# Patient Record
Sex: FEMALE | Race: WHITE | NOT HISPANIC OR LATINO | Employment: FULL TIME | URBAN - METROPOLITAN AREA
[De-identification: names, ages, dates, MRNs, and addresses within clinical notes are randomized per-mention and may not be internally consistent; named-entity substitution may affect disease eponyms.]

---

## 2023-09-27 VITALS — WEIGHT: 197.53 LBS | HEIGHT: 64 IN | BODY MASS INDEX: 33.72 KG/M2

## 2023-09-27 DIAGNOSIS — C50.919 MALIGNANT NEOPLASM OF FEMALE BREAST, UNSPECIFIED ESTROGEN RECEPTOR STATUS, UNSPECIFIED LATERALITY, UNSPECIFIED SITE OF BREAST (MULTI): Primary | ICD-10-CM

## 2023-09-27 RX ORDER — HEPARIN 100 UNIT/ML
500 SYRINGE INTRAVENOUS AS NEEDED
Status: CANCELLED | OUTPATIENT
Start: 2023-09-30

## 2023-09-27 RX ORDER — HEPARIN SODIUM,PORCINE/PF 10 UNIT/ML
50 SYRINGE (ML) INTRAVENOUS AS NEEDED
Status: CANCELLED | OUTPATIENT
Start: 2023-09-30

## 2023-10-14 PROBLEM — I10 HYPERTENSION: Status: ACTIVE | Noted: 2023-10-14

## 2023-10-14 PROBLEM — K57.32 DIVERTICULITIS OF COLON: Status: ACTIVE | Noted: 2023-10-14

## 2023-10-14 PROBLEM — N60.82 FLAT EPITHELIAL ATYPIA OF LEFT BREAST: Status: ACTIVE | Noted: 2023-10-14

## 2023-10-14 PROBLEM — C78.00 BREAST CANCER METASTASIZED TO LUNG (MULTI): Status: ACTIVE | Noted: 2023-10-14

## 2023-10-14 PROBLEM — M17.10 ARTHRITIS OF KNEE: Status: ACTIVE | Noted: 2023-10-14

## 2023-10-14 PROBLEM — R59.1 LYMPHADENOPATHY: Status: ACTIVE | Noted: 2023-10-14

## 2023-10-14 PROBLEM — A04.71 RECURRENT COLITIS DUE TO CLOSTRIDIUM DIFFICILE: Status: ACTIVE | Noted: 2023-10-14

## 2023-10-14 PROBLEM — E87.6 HYPOKALEMIA: Status: ACTIVE | Noted: 2023-10-14

## 2023-10-14 PROBLEM — C77.3 METASTATIC CANCER TO AXILLARY LYMPH NODES (MULTI): Status: ACTIVE | Noted: 2023-10-14

## 2023-10-14 PROBLEM — M17.10 PRIMARY LOCALIZED OSTEOARTHROSIS, LOWER LEG: Status: ACTIVE | Noted: 2023-10-14

## 2023-10-14 PROBLEM — A04.72 CLOSTRIDIUM DIFFICILE DIARRHEA: Status: ACTIVE | Noted: 2023-10-14

## 2023-10-14 PROBLEM — C50.919 BREAST CANCER METASTASIZED TO LUNG (MULTI): Status: ACTIVE | Noted: 2023-10-14

## 2023-10-14 RX ORDER — CALCIUM CARBONATE/VITAMIN D3 600MG-5MCG
1 TABLET ORAL 2 TIMES DAILY
COMMUNITY
End: 2023-11-16 | Stop reason: ALTCHOICE

## 2023-10-14 RX ORDER — PSYLLIUM HUSK 0.4 G
CAPSULE ORAL 2 TIMES DAILY
Status: ON HOLD | COMMUNITY
End: 2024-03-08 | Stop reason: WASHOUT

## 2023-10-14 RX ORDER — LAMOTRIGINE 25 MG/1
TABLET ORAL
COMMUNITY
Start: 2021-08-16 | End: 2023-11-16 | Stop reason: ALTCHOICE

## 2023-10-14 RX ORDER — CHLORTHALIDONE 25 MG/1
1 TABLET ORAL DAILY
COMMUNITY

## 2023-10-14 RX ORDER — POTASSIUM CHLORIDE 20 MEQ/1
2 TABLET, EXTENDED RELEASE ORAL 2 TIMES DAILY
COMMUNITY

## 2023-10-14 RX ORDER — VIT C/E/ZN/COPPR/LUTEIN/ZEAXAN 250MG-90MG
1 CAPSULE ORAL DAILY
Status: ON HOLD | COMMUNITY
End: 2024-03-08 | Stop reason: WASHOUT

## 2023-10-14 RX ORDER — LEVOTHYROXINE SODIUM 75 UG/1
1 TABLET ORAL DAILY
COMMUNITY
Start: 2015-03-21 | End: 2023-11-16 | Stop reason: ALTCHOICE

## 2023-10-14 RX ORDER — CAPECITABINE 500 MG/1
TABLET, FILM COATED ORAL
COMMUNITY
Start: 2023-01-07 | End: 2023-11-16 | Stop reason: ALTCHOICE

## 2023-10-14 RX ORDER — ALBUTEROL SULFATE 90 UG/1
2 AEROSOL, METERED RESPIRATORY (INHALATION) 4 TIMES DAILY PRN
COMMUNITY
Start: 2022-11-02 | End: 2023-11-16 | Stop reason: ALTCHOICE

## 2023-10-14 RX ORDER — MULTIVIT,IRON,MINERALS/LUTEIN
1 TABLET ORAL DAILY
Status: ON HOLD | COMMUNITY
End: 2024-03-08 | Stop reason: WASHOUT

## 2023-10-14 RX ORDER — LEVOTHYROXINE SODIUM 88 UG/1
TABLET ORAL
COMMUNITY
Start: 2023-09-19 | End: 2023-12-26

## 2023-10-14 RX ORDER — ONDANSETRON HYDROCHLORIDE 8 MG/1
1 TABLET, FILM COATED ORAL EVERY 8 HOURS PRN
Status: ON HOLD | COMMUNITY
Start: 2023-04-22 | End: 2024-03-08 | Stop reason: WASHOUT

## 2023-10-14 RX ORDER — ASPIRIN 81 MG/1
1 TABLET ORAL DAILY
COMMUNITY
Start: 2015-06-11 | End: 2023-11-16 | Stop reason: ALTCHOICE

## 2023-10-14 RX ORDER — PAROXETINE HYDROCHLORIDE 10 MG/1
1 TABLET, FILM COATED ORAL DAILY
COMMUNITY
Start: 2016-01-28

## 2023-10-14 RX ORDER — LANOLIN ALCOHOL/MO/W.PET/CERES
1 CREAM (GRAM) TOPICAL DAILY
Status: ON HOLD | COMMUNITY
End: 2024-03-08 | Stop reason: WASHOUT

## 2023-10-14 RX ORDER — POTASSIUM CHLORIDE 1.5 G/1.58G
1 POWDER, FOR SOLUTION ORAL DAILY
Status: ON HOLD | COMMUNITY
Start: 2015-06-11 | End: 2024-02-23 | Stop reason: ENTERED-IN-ERROR

## 2023-10-14 RX ORDER — HYDROCHLOROTHIAZIDE 25 MG/1
1 TABLET ORAL DAILY
Status: ON HOLD | COMMUNITY
Start: 2015-06-16 | End: 2024-03-08 | Stop reason: WASHOUT

## 2023-10-14 RX ORDER — CHOLECALCIFEROL (VITAMIN D3) 25 MCG
1 TABLET ORAL DAILY
COMMUNITY
Start: 2015-06-11 | End: 2023-11-16 | Stop reason: SDUPTHER

## 2023-10-14 RX ORDER — AMLODIPINE BESYLATE 5 MG/1
1 TABLET ORAL DAILY
COMMUNITY
Start: 2016-04-15

## 2023-10-14 RX ORDER — LIDOCAINE AND PRILOCAINE 25; 25 MG/G; MG/G
CREAM TOPICAL
COMMUNITY
Start: 2023-02-10 | End: 2023-11-16 | Stop reason: ALTCHOICE

## 2023-10-17 ENCOUNTER — OFFICE VISIT (OUTPATIENT)
Dept: HEMATOLOGY/ONCOLOGY | Facility: HOSPITAL | Age: 63
End: 2023-10-17
Payer: COMMERCIAL

## 2023-10-17 ENCOUNTER — LAB (OUTPATIENT)
Dept: HEMATOLOGY/ONCOLOGY | Facility: HOSPITAL | Age: 63
End: 2023-10-17
Payer: COMMERCIAL

## 2023-10-17 VITALS
RESPIRATION RATE: 20 BRPM | TEMPERATURE: 98.1 F | OXYGEN SATURATION: 97 % | HEART RATE: 93 BPM | HEIGHT: 64 IN | DIASTOLIC BLOOD PRESSURE: 83 MMHG | BODY MASS INDEX: 32.93 KG/M2 | SYSTOLIC BLOOD PRESSURE: 140 MMHG | WEIGHT: 192.9 LBS

## 2023-10-17 DIAGNOSIS — C50.919 MALIGNANT NEOPLASM OF FEMALE BREAST, UNSPECIFIED ESTROGEN RECEPTOR STATUS, UNSPECIFIED LATERALITY, UNSPECIFIED SITE OF BREAST (MULTI): Primary | ICD-10-CM

## 2023-10-17 DIAGNOSIS — C50.919 MALIGNANT NEOPLASM OF FEMALE BREAST, UNSPECIFIED ESTROGEN RECEPTOR STATUS, UNSPECIFIED LATERALITY, UNSPECIFIED SITE OF BREAST (MULTI): ICD-10-CM

## 2023-10-17 LAB
ALBUMIN SERPL BCP-MCNC: 4.1 G/DL (ref 3.4–5)
ALP SERPL-CCNC: 80 U/L (ref 33–136)
ALT SERPL W P-5'-P-CCNC: 30 U/L (ref 7–45)
ANION GAP SERPL CALC-SCNC: 11 MMOL/L (ref 10–20)
AST SERPL W P-5'-P-CCNC: 47 U/L (ref 9–39)
BASOPHILS # BLD AUTO: 0.08 X10*3/UL (ref 0–0.1)
BASOPHILS NFR BLD AUTO: 1.4 %
BILIRUB SERPL-MCNC: 1.4 MG/DL (ref 0–1.2)
BUN SERPL-MCNC: 10 MG/DL (ref 6–23)
CALCIUM SERPL-MCNC: 9.6 MG/DL (ref 8.6–10.3)
CHLORIDE SERPL-SCNC: 99 MMOL/L (ref 98–107)
CO2 SERPL-SCNC: 31 MMOL/L (ref 21–32)
CREAT SERPL-MCNC: 0.59 MG/DL (ref 0.5–1.05)
DACRYOCYTES BLD QL SMEAR: NORMAL
EOSINOPHIL # BLD AUTO: 0.27 X10*3/UL (ref 0–0.7)
EOSINOPHIL NFR BLD AUTO: 4.8 %
ERYTHROCYTE [DISTWIDTH] IN BLOOD BY AUTOMATED COUNT: 23.6 % (ref 11.5–14.5)
GFR SERPL CREATININE-BSD FRML MDRD: >90 ML/MIN/1.73M*2
GLUCOSE SERPL-MCNC: 135 MG/DL (ref 74–99)
HCT VFR BLD AUTO: 32.7 % (ref 36–46)
HGB BLD-MCNC: 10.8 G/DL (ref 12–16)
IMM GRANULOCYTES # BLD AUTO: 0.02 X10*3/UL (ref 0–0.7)
IMM GRANULOCYTES NFR BLD AUTO: 0.4 % (ref 0–0.9)
LYMPHOCYTES # BLD AUTO: 0.95 X10*3/UL (ref 1.2–4.8)
LYMPHOCYTES NFR BLD AUTO: 17 %
MCH RBC QN AUTO: 25.7 PG (ref 26–34)
MCHC RBC AUTO-ENTMCNC: 33 G/DL (ref 32–36)
MCV RBC AUTO: 78 FL (ref 80–100)
MONOCYTES # BLD AUTO: 0.92 X10*3/UL (ref 0.1–1)
MONOCYTES NFR BLD AUTO: 16.5 %
NEUTROPHILS # BLD AUTO: 3.34 X10*3/UL (ref 1.2–7.7)
NEUTROPHILS NFR BLD AUTO: 59.9 %
NRBC BLD-RTO: 0 /100 WBCS (ref 0–0)
OVALOCYTES BLD QL SMEAR: NORMAL
PLATELET # BLD AUTO: 314 X10*3/UL (ref 150–450)
PMV BLD AUTO: ABNORMAL FL
POLYCHROMASIA BLD QL SMEAR: NORMAL
POTASSIUM SERPL-SCNC: 3.4 MMOL/L (ref 3.5–5.3)
PROT SERPL-MCNC: 7.5 G/DL (ref 6.4–8.2)
RBC # BLD AUTO: 4.2 X10*6/UL (ref 4–5.2)
RBC MORPH BLD: NORMAL
SCHISTOCYTES BLD QL SMEAR: NORMAL
SODIUM SERPL-SCNC: 138 MMOL/L (ref 136–145)
WBC # BLD AUTO: 5.6 X10*3/UL (ref 4.4–11.3)

## 2023-10-17 PROCEDURE — 99215 OFFICE O/P EST HI 40 MIN: CPT | Performed by: INTERNAL MEDICINE

## 2023-10-17 PROCEDURE — 96523 IRRIG DRUG DELIVERY DEVICE: CPT

## 2023-10-17 PROCEDURE — 80053 COMPREHEN METABOLIC PANEL: CPT

## 2023-10-17 PROCEDURE — 36591 DRAW BLOOD OFF VENOUS DEVICE: CPT

## 2023-10-17 PROCEDURE — 3077F SYST BP >= 140 MM HG: CPT | Performed by: INTERNAL MEDICINE

## 2023-10-17 PROCEDURE — 3079F DIAST BP 80-89 MM HG: CPT | Performed by: INTERNAL MEDICINE

## 2023-10-17 PROCEDURE — 85025 COMPLETE CBC W/AUTO DIFF WBC: CPT

## 2023-10-17 RX ORDER — FAMOTIDINE 10 MG/ML
20 INJECTION INTRAVENOUS ONCE AS NEEDED
Status: CANCELLED | OUTPATIENT
Start: 2023-10-20

## 2023-10-17 RX ORDER — DIPHENHYDRAMINE HYDROCHLORIDE 50 MG/ML
50 INJECTION INTRAMUSCULAR; INTRAVENOUS AS NEEDED
Status: CANCELLED | OUTPATIENT
Start: 2023-10-20

## 2023-10-17 RX ORDER — ALBUTEROL SULFATE 0.83 MG/ML
3 SOLUTION RESPIRATORY (INHALATION) AS NEEDED
Status: CANCELLED | OUTPATIENT
Start: 2023-11-10

## 2023-10-17 RX ORDER — PROCHLORPERAZINE EDISYLATE 5 MG/ML
10 INJECTION INTRAMUSCULAR; INTRAVENOUS EVERY 6 HOURS PRN
Status: CANCELLED | OUTPATIENT
Start: 2023-10-20

## 2023-10-17 RX ORDER — PROCHLORPERAZINE MALEATE 5 MG
10 TABLET ORAL EVERY 6 HOURS PRN
Status: CANCELLED | OUTPATIENT
Start: 2023-11-10

## 2023-10-17 RX ORDER — FAMOTIDINE 10 MG/ML
20 INJECTION INTRAVENOUS ONCE AS NEEDED
Status: CANCELLED | OUTPATIENT
Start: 2023-11-10

## 2023-10-17 RX ORDER — EPINEPHRINE 0.3 MG/.3ML
0.3 INJECTION SUBCUTANEOUS EVERY 5 MIN PRN
Status: CANCELLED | OUTPATIENT
Start: 2023-11-10

## 2023-10-17 RX ORDER — PROCHLORPERAZINE EDISYLATE 5 MG/ML
10 INJECTION INTRAMUSCULAR; INTRAVENOUS EVERY 6 HOURS PRN
Status: CANCELLED | OUTPATIENT
Start: 2023-11-10

## 2023-10-17 RX ORDER — EPINEPHRINE 0.3 MG/.3ML
0.3 INJECTION SUBCUTANEOUS EVERY 5 MIN PRN
Status: CANCELLED | OUTPATIENT
Start: 2023-10-20

## 2023-10-17 RX ORDER — PROCHLORPERAZINE MALEATE 5 MG
10 TABLET ORAL EVERY 6 HOURS PRN
Status: CANCELLED | OUTPATIENT
Start: 2023-10-20

## 2023-10-17 RX ORDER — DIPHENHYDRAMINE HYDROCHLORIDE 50 MG/ML
50 INJECTION INTRAMUSCULAR; INTRAVENOUS AS NEEDED
Status: CANCELLED | OUTPATIENT
Start: 2023-11-10

## 2023-10-17 RX ORDER — PALONOSETRON 0.05 MG/ML
0.25 INJECTION, SOLUTION INTRAVENOUS ONCE
Status: CANCELLED | OUTPATIENT
Start: 2023-10-20

## 2023-10-17 RX ORDER — ALBUTEROL SULFATE 0.83 MG/ML
3 SOLUTION RESPIRATORY (INHALATION) AS NEEDED
Status: CANCELLED | OUTPATIENT
Start: 2023-10-20

## 2023-10-17 RX ORDER — PALONOSETRON 0.05 MG/ML
0.25 INJECTION, SOLUTION INTRAVENOUS ONCE
Status: CANCELLED | OUTPATIENT
Start: 2023-11-10

## 2023-10-17 ASSESSMENT — PAIN SCALES - GENERAL: PAINLEVEL: 0-NO PAIN

## 2023-10-17 NOTE — PROGRESS NOTES
Patient Visit Information:   Visit Type: Follow Up Visit      Cancer History:          Breast         AJCC Edition: 7th (AJCC), Diagnosis Date: 05-Jun-2015, IV, T2 N1 M1      Treatment Synopsis:    62-year-old postmenopausal  female with stage IV multifocal right-sided invasive ductal carcinoma with metastatic disease to the lungs bilaterally.      The patient's breast cancer was diagnosed on June 5, 2015, and is estrogen receptor positive at 90%, progesterone receptor positive at 70%, and HER-2/jordyn negative. Staging CT showed bilateral pulmonary nodules. Nodules were biospy-proven (07/01/2015)  to be consistent with metastatic mammary carcinoma with neuroendocrine features, ER 99%, ME 85%, and her2 negative (1+). .     CURRENT THERAPY: Fam Trastuzumab Deruxtecan              Treatment History:    2015-7-1: Cancer Staging: Lung biopsy: Metastatic mammary carcinoma with neuroendocrine features, ER 99%, ME 85%, and her2 negative.  2015-7-8: New Treatment Plan: Initiated on Letrozole 2.5mg by mouth daily plus pablociclib 125 mg by mouth daily 3 weeks on, one week off.  2018-8-10: New Treatment Plan: Letrozole discontinued. Patient started on Faslodex plus Ibrance due to slight disease progression in right breast  2019-1-16: Cancer Related Surgery: S/p bilateral mastectomy with right ALND. Pathology revealed 3.0cm of invasive ductal carcinoma, with 1/2 SLNs positive and 1/5 ALNs positive.  2019-4-19: New Treatment Plan: Faslodex discontinued. Letrozole restarted. Will continue on Ibrance  2020-9-11: New Treatment Plan: Faslodex resumed due to progression in lung. Letrozole discontinued. Will remain on Ibrance  2022-7-2: New Treatment Plan: Started on Afinitor and Exemestane. Ibrance and Faslodex discontinued due to disease progression  2022-9-16: Disease Assessment: Interval increase in thoracic tumor burden  2022-10-7: New Treatment Plan: Started on Capecitabine 2000mg by mouth BID        History  of Present Illness:      ID Statement:    TJ CROCKETT is a 62 year old Female        Chief Complaint: Here for a follow-up visit and to  be evaluated prior to cycle #11 and to review scans      Interval History:       INTERVAL HISTORY     Ms. Yue Barroso comes in for a follow-up visit.  She denies fever chills or rigors, n/v/d.     Last set of restaging scans were completed on  09/2012023. This showed stable disease.      Her last echo was completed on 08/10/2023 which showed LVEF 60-65%              Allergies and Intolerances:       Allergies:         penicillin: Drug, Hives/Urticaria, Active     Outpatient Medication Profile:  * Patient Currently Takes Medications as of 05-Sep-2023 10:52 documented in Structured Notes         ondansetron 8 mg oral tablet : Last Dose Taken:  , 1 tab(s) orally every 8 hours, As Needed , Start Date: 28-Feb-2023         lidocaine-prilocaine 2.5%-2.5% topical cream: Last Dose Taken:  , [facility]  [facility]-45 [facility] before being accessed, Start Date: 10-Feb-2023         chlorthalidone 25 mg oral tablet: Last Dose Taken:  , 1 tab(s) orally once  a day         Vitamin D3 1000 intl units oral capsule: Last Dose Taken:  , 1 cap(s) orally  once a day         Paxil 10 mg oral tablet: Last Dose Taken:  , 1 tab(s) orally once a day         Centrum Adults oral tablet: Last Dose Taken:  , 1 tab(s) orally once a  day         amLODIPine 5 mg oral tablet: Last Dose Taken:  , 1 tab(s) orally once a  day         Calcium 600+D 600 mg-200 intl units oral tablet: Last Dose Taken:  , 1  tab(s) orally 2 times a day         magnesium oxide 400 mg oral tablet: Last Dose Taken:  , 1 tab(s) orally  once a day         potassium chloride 20 mEq oral tablet, extended release: Last Dose Taken:   , 2 tab(s) orally 2 times a day         turmeric: Last Dose Taken:  , 1500 milligram(s) orally once a day         Synthroid 88 mcg (0.088 mg) oral tablet: Last Dose Taken:  , 1 tab(s)  orally  once a day             Medical History:         High risk medication use: ICD-10: Z79.899, Status: Active         Breast cancer: ICD-10: C50.919, Status: Active         Recurrent Clostridium difficile diarrhea: ICD-10: A04.7, Status:  Active         Hypokalemia: ICD-10: E87.6, Status: Active         Malignant neoplasm of breast: ICD-10: C50.919, Status: Active        Social History:   Social Substance History:  ·  Smoking Status never smoker (1)   ·  Additional History     Breast Cancer Risk Factors:  , Had her menarche at age 10 years,  menopause 52 , used BCP x 2 years but never used HRT     Family History   Mother had ovarian cancer - age 65- BRCA negative s/p b/l salpingo oopherectomy.  Father  had colon cancer- age 56,  at 59 of metastatic disease.     Social History    Never smoker , social alcohol use  Active , exercises regularly, administrative job.                 Performance:   ECOG Performance Status: 0 Fully Active         Vitals and Measurements:   Vitals: Temp: 36.8  HR: 95  RR: 17  BP: 139/87  SPO2%:   99   Measurements: HT(cm): 163.8  WT(kg): 88.7  BSA: 2   BMI:  33   Second Set of Vitals/Vitals Comment: 148/ 84 manual   Last 3 Weights & Heights: Date:                           Weight/Scale Type:                    Height:   2019 12:27                98.6  kg / standing scale                     162.5  cm  17-May-2019 10:25                99.2  kg / standing scale                     162.5  cm  2019 10:35                100  kg / standing scale                     162.5  cm      Physical Exam:      Constitutional: AAOx3. appears comfortable.   Eyes: B/l GAEL. EOMI   ENMT: No oral ulcers or thrush. No pharyngeal congestion.   Head/Neck: No thyromegaly/JVD.   Respiratory/Thorax: B/l Air entry equal. No added  sounds.   Cardiovascular: S1s2+. No murmur/rub/Gallop   Gastrointestinal: Soft, Non tender. No hepatosplenomegaly.  BS+.   Extremities: No pedal edema.   Neurological: No  focal motor deficits.   Breast: B/l s/p mastectomy, Lumpiness felt-stable  since last clinic visit.  No lymphedema   Lymphatic: No significant lymphadenopathy   Psychological: Appropriate mood and behavior   Skin: No rash/petechiae         Lab Results:     ·  Results        CBC date/time       WBC     HGB     HCT     PLT     Neut      05-Sep-2023 10:34   7.3     11.1(L) 34.2(L) 324     4.55     BMP date/time       NA              K               CL              CO2             BUN             CREAT             15-Aug-2023 09:57   138             3.7             102             N/A             13              0.56     Hepatic date/time   T Pro   T Bili  AST     ALT     ALKP    ALB       06-Jan-2023 07:49   6.7     2.1(H)  N/A     24      88      4.0     LDH date/time       LDH     03-Jun-2022 09:45   N/A     Radiology Result:     ·  Results           Impression:     1.  Interval mild worsening of metastatic disease burden within the  chest demonstrated by multiple enlarging subcentimeter nodules and  progressive enlargement of multiple mediastinal lymph nodes as  described above. Stable appearance of the larger solid nodules as  described above without evidence of new nodules or masses.  2. No evidence of metastatic disease within the abdomen or pelvis.  3. Additional stable chronic findings as above.      CT Chest Abdomen Pelvis with IV Contrast [Sep 16 2022  5:01PM]        Impression:     Breast cancer, restaging scan. When compared with the 04/01/2022  study, there is minimal interval worsening of intrathoracic tumor  burden as described above. No definite new sites of disease seen on  current study. Hepatic steatosis, to be correlated with serum LFTs.      CT Chest Abdomen Pelvis with IV Contrast [Jair  3 2022  2:16PM]        Impression:     CHEST:  1.  Grossly similar size of a 1.9 cm right upper lobe nodule which  infiltrates the right upper lobe segmental bronchi. There is interval  anterior extension along the  bronchovascular bundle with extension  towards 1 cm satellite lesion which may represent atelectatic changes  due to mucous plugging versus interval increase in metastatic disease  burden. There is otherwise overall similar intrathoracic tumor burden  with grossly stable size of multiple pulmonary nodules. There are no  new pulmonarynodules visualized.  2. Grossly similar appearance of intrathoracic lymphadenopathy.  3. Similar stenosis of the upper right segmental bronchi by an  adjacent metastatic pulmonary nodule without complete obstruction.  4. Postsurgical changes of bilateral mastectomy without evidence of  local recurrence.     ABDOMEN-PELVIS:  1.  No new abdominopelvic metastatic disease or lymphadenopathy.  2. No acute abdominopelvic process.      CT Chest Abdomen Pelvis with IV Contrast [Apr 1 2022  7:46PM]        Echocardiogram [May  2 2023  9:30AM]        Assessment and Plan:      Assessment and Plan:   Assessment:    62 year old post menopausal woman with stage IV breast cancer, invasive ductal carcinoma, ER/NM positive, HER 2 negative. s/p bilateral mastectomies in 01/2019.  Started on Ibrance and faslodex. Patient switched back to Letrozole due to the fact that progression was in breast only and she has now undergone a mastectomy.  Has since developed progression.      She is currently on Enhertu. Friday will be C11 D1.      Scans on 9/1/2023 showed stable disease.     Treatment will proceed on Friday                    Plan:        Patient will continue with Enhertu every 3 weeks on Friays with MD visits on Tuesdays all on main campus   CBC with diff and CMP on Tuesdays prior to treatment on Fridays   RTC in 3 days for cycle #13 of Enhertu and every 3 weeks thereafter   RTC for MD vist, on 12/22/2023   Next Echo due in November   Will follow-up with surgery in a year for surveillance MRI for suspected neuroendocrine tumor of pancreas

## 2023-10-17 NOTE — PROGRESS NOTES
Patient Visit Information:   Visit Type: Follow Up Visit      Cancer History:          Breast         AJCC Edition: 7th (AJCC), Diagnosis Date: 05-Jun-2015, IV, T2 N1 M1      Treatment Synopsis:    62-year-old postmenopausal  female with stage IV multifocal right-sided invasive ductal carcinoma with metastatic disease to the lungs bilaterally.      The patient's breast cancer was diagnosed on June 5, 2015, and is estrogen receptor positive at 90%, progesterone receptor positive at 70%, and HER-2/jordyn negative. Staging CT showed bilateral pulmonary nodules. Nodules were biospy-proven (07/01/2015)  to be consistent with metastatic mammary carcinoma with neuroendocrine features, ER 99%, VT 85%, and her2 negative (1+). .     CURRENT THERAPY: Fam Trastuzumab Deruxtecan              Treatment History:    2015-7-1: Cancer Staging: Lung biopsy: Metastatic mammary carcinoma with neuroendocrine features, ER 99%, VT 85%, and her2 negative.  2015-7-8: New Treatment Plan: Initiated on Letrozole 2.5mg by mouth daily plus pablociclib 125 mg by mouth daily 3 weeks on, one week off.  2018-8-10: New Treatment Plan: Letrozole discontinued. Patient started on Faslodex plus Ibrance due to slight disease progression in right breast  2019-1-16: Cancer Related Surgery: S/p bilateral mastectomy with right ALND. Pathology revealed 3.0cm of invasive ductal carcinoma, with 1/2 SLNs positive and 1/5 ALNs positive.  2019-4-19: New Treatment Plan: Faslodex discontinued. Letrozole restarted. Will continue on Ibrance  2020-9-11: New Treatment Plan: Faslodex resumed due to progression in lung. Letrozole discontinued. Will remain on Ibrance  2022-7-2: New Treatment Plan: Started on Afinitor and Exemestane. Ibrance and Faslodex discontinued due to disease progression  2022-9-16: Disease Assessment: Interval increase in thoracic tumor burden  2022-10-7: New Treatment Plan: Started on Capecitabine 2000mg by mouth BID  2023-2-10: New treatment  started with FAM-Trastuzumab Deruxtecan due to disease progression        History of Present Illness:      ID Statement:    TJ CROCKETT is a 62 year old Female        Chief Complaint: Here for a follow-up visit and to  be evaluated prior to cycle #13      Interval History:       INTERVAL HISTORY     Ms. Yue Barroso comes in for a follow-up visit.  She denies fever chills or rigors, n/v/d.     Last set of restaging scans were completed on  09/20/2023. This showed stable disease.      Her last echo was completed on 08/10/2023 which showed LVEF 60-65%              Allergies and Intolerances:       Allergies:         penicillin: Drug, Hives/Urticaria, Active     Outpatient Medication Profile:  * Patient Currently Takes Medications as of 05-Sep-2023 10:52 documented in Structured Notes         ondansetron 8 mg oral tablet : Last Dose Taken:  , 1 tab(s) orally every 8 hours, As Needed , Start Date: 28-Feb-2023         lidocaine-prilocaine 2.5%-2.5% topical cream: Last Dose Taken:  , Apply  topically as needed to [facility] site 30-45 minutes before being accessed, Start Date: 10-Feb-2023         chlorthalidone 25 mg oral tablet: Last Dose Taken:  , 1 tab(s) orally once  a day         Vitamin D3 1000 intl units oral capsule: Last Dose Taken:  , 1 cap(s) orally  once a day         Paxil 10 mg oral tablet: Last Dose Taken:  , 1 tab(s) orally once a day         Centrum Adults oral tablet: Last Dose Taken:  , 1 tab(s) orally once a  day         amLODIPine 5 mg oral tablet: Last Dose Taken:  , 1 tab(s) orally once a  day         Calcium 600+D 600 mg-200 intl units oral tablet: Last Dose Taken:  , 1  tab(s) orally 2 times a day         magnesium oxide 400 mg oral tablet: Last Dose Taken:  , 1 tab(s) orally  once a day         potassium chloride 20 mEq oral tablet, extended release: Last Dose Taken:   , 2 tab(s) orally 2 times a day         turmeric: Last Dose Taken:  , 1500 milligram(s) orally once a day         Synthroid 88  mcg (0.088 mg) oral tablet: Last Dose Taken:  , 1 tab(s)  orally once a day             Medical History:         High risk medication use: ICD-10: Z79.899, Status: Active         Breast cancer: ICD-10: C50.919, Status: Active         Recurrent Clostridium difficile diarrhea: ICD-10: A04.7, Status:  Active         Hypokalemia: ICD-10: E87.6, Status: Active         Malignant neoplasm of breast: ICD-10: C50.919, Status: Active        Social History:   Social Substance History:  ·  Smoking Status never smoker (1)   ·  Additional History     Breast Cancer Risk Factors:  , Had her menarche at age 10 years,  menopause 52 , used BCP x 2 years but never used HRT     Family History   Mother had ovarian cancer - age 65- BRCA negative s/p b/l salpingo oopherectomy.  Father  had colon cancer- age 56,  at 59 of metastatic disease.     Social History    Never smoker , social alcohol use  Active , exercises regularly, administrative job.                 Performance:   ECOG Performance Status: 0 Fully Active         Vitals and Measurements:   Vitals: Temp: 36.8  HR: 95  RR: 17  BP: 139/87  SPO2%:   99   Measurements: HT(cm): 163.8  WT(kg): 88.7  BSA: 2   BMI:  33   Second Set of Vitals/Vitals Comment: 148/ 84 manual   Last 3 Weights & Heights: Date:                           Weight/Scale Type:                    Height:   2019 12:27                98.6  kg / standing scale                     162.5  cm  17-May-2019 10:25                99.2  kg / standing scale                     162.5  cm  2019 10:35                100  kg / standing scale                     162.5  cm      Physical Exam:      Constitutional: AAOx3. appears comfortable.   Eyes: B/l GAEL. EOMI   ENMT: No oral ulcers or thrush. No pharyngeal congestion.   Head/Neck: No thyromegaly/JVD.   Respiratory/Thorax: B/l Air entry equal. No added  sounds.   Cardiovascular: S1s2+. No murmur/rub/Gallop   Gastrointestinal: Soft, Non tender. No  hepatosplenomegaly.  BS+.   Extremities: No pedal edema.   Neurological: No focal motor deficits.   Breast: B/l s/p mastectomy, Lumpiness felt-stable  since last clinic visit.  No lymphedema   Lymphatic: No significant lymphadenopathy   Psychological: Appropriate mood and behavior   Skin: No rash/petechiae         Lab Results:     ·  Results       Lab Results   Component Value Date    WBC 5.6 10/17/2023    HGB 10.8 (L) 10/17/2023    HCT 32.7 (L) 10/17/2023    MCV 78 (L) 10/17/2023     10/17/2023        Lab Results   Component Value Date    NEUTROABS 3.34 10/17/2023          Chemistry    Lab Results   Component Value Date/Time     10/17/2023 1108    K 3.4 (L) 10/17/2023 1108    CL 99 10/17/2023 1108    CO2 31 10/17/2023 1108    BUN 10 10/17/2023 1108    CREATININE 0.59 10/17/2023 1108    Lab Results   Component Value Date/Time    CALCIUM 9.6 10/17/2023 1108    ALKPHOS 80 10/17/2023 1108    AST 47 (H) 10/17/2023 1108    ALT 30 10/17/2023 1108    BILITOT 1.4 (H) 10/17/2023 1108         Radiology Result:     ·  Results        Study Result    Narrative & Impression   Interpreted By:  CESRA BRUCE MD  MRN: [id number]  Patient Name: TJ CROCKETT     STUDY:  CT CHEST ABDOMEN PELVIS W IV CONTRAST;  9/1/2023 10:23 am     INDICATION:  Breast cancer with lung metastases. Initially diagnosed 06/2015.     COMPARISON:  CT chest, abdomen, and pelvis 04/27/2023.     ACCESSION NUMBER(S):  [id number]     ORDERING CLINICIAN:  OTTONIEL POWERS   IMPRESSION:  Restaging scan for breast cancer.  1. Overall stable tumor burden when compared to most recent CT  04/20/2023.  2. No significant interval change in size of a right lower lobe  pulmonary mass or bilateral pulmonary nodules.  3. Similar size of mediastinal and right hilar lymph nodes.  4. Similar 4 mm hyperenhancing focus in the pancreatic tail compared  to prior CT which may represent a primary pancreatic lesion such as a  neuroendocrine tumor or less likely  metastases.  5. No new sites of metastatic disease are identified.  6. Additional chronic findings are stable as detailed above.     Feel free to Doc Sunny Lopes with any questions or concerns  about this report.          Assessment and Plan:   Assessment:    62 year old post menopausal woman with stage IV breast cancer, invasive ductal carcinoma, ER/NV positive, HER 2 negative. s/p bilateral mastectomies in 01/2019.  Started on Ibrance and faslodex. Patient switched back to Letrozole due to the fact that progression was in breast only and she has now undergone a mastectomy.  Has since developed progression.      She is currently on Enhertu. Friday will be C13 D1.      Scans on 9/1/2023 showed stable disease.     Treatment will proceed on Friday        Plan:         Patient will continue with Enhertu every 3 weeks on Friays with MD visits on Tuesdays all on [facility]   CBC with diff and CMP on Tuesdays prior to treatment on Fridays   RTC in 3 days for cycle #13 of Enhertu and every 3 weeks thereafter   RTC for MD vist, on 12/22/2023   Next Echo due in November   Will follow-up with surgery in a year for surveillance MRI for suspected neuroendocrine tumor of pancreas

## 2023-10-20 ENCOUNTER — INFUSION (OUTPATIENT)
Dept: HEMATOLOGY/ONCOLOGY | Facility: HOSPITAL | Age: 63
End: 2023-10-20
Payer: COMMERCIAL

## 2023-10-20 VITALS
RESPIRATION RATE: 18 BRPM | TEMPERATURE: 97.2 F | HEART RATE: 63 BPM | SYSTOLIC BLOOD PRESSURE: 133 MMHG | WEIGHT: 196.87 LBS | DIASTOLIC BLOOD PRESSURE: 68 MMHG | OXYGEN SATURATION: 98 % | BODY MASS INDEX: 33.28 KG/M2

## 2023-10-20 DIAGNOSIS — C50.919 MALIGNANT NEOPLASM OF FEMALE BREAST, UNSPECIFIED ESTROGEN RECEPTOR STATUS, UNSPECIFIED LATERALITY, UNSPECIFIED SITE OF BREAST (MULTI): ICD-10-CM

## 2023-10-20 PROCEDURE — 2500000004 HC RX 250 GENERAL PHARMACY W/ HCPCS (ALT 636 FOR OP/ED): Performed by: INTERNAL MEDICINE

## 2023-10-20 PROCEDURE — 96413 CHEMO IV INFUSION 1 HR: CPT

## 2023-10-20 PROCEDURE — 2500000004 HC RX 250 GENERAL PHARMACY W/ HCPCS (ALT 636 FOR OP/ED): Mod: JZ | Performed by: INTERNAL MEDICINE

## 2023-10-20 PROCEDURE — 96375 TX/PRO/DX INJ NEW DRUG ADDON: CPT | Mod: INF

## 2023-10-20 PROCEDURE — 2500000004 HC RX 250 GENERAL PHARMACY W/ HCPCS (ALT 636 FOR OP/ED): Performed by: PHARMACIST

## 2023-10-20 RX ORDER — PROCHLORPERAZINE MALEATE 10 MG
10 TABLET ORAL EVERY 6 HOURS PRN
Status: DISCONTINUED | OUTPATIENT
Start: 2023-10-20 | End: 2023-10-20 | Stop reason: HOSPADM

## 2023-10-20 RX ORDER — ALBUTEROL SULFATE 0.83 MG/ML
3 SOLUTION RESPIRATORY (INHALATION) AS NEEDED
Status: DISCONTINUED | OUTPATIENT
Start: 2023-10-20 | End: 2023-10-20 | Stop reason: HOSPADM

## 2023-10-20 RX ORDER — HEPARIN SODIUM,PORCINE/PF 10 UNIT/ML
50 SYRINGE (ML) INTRAVENOUS AS NEEDED
Status: CANCELLED | OUTPATIENT
Start: 2023-10-20

## 2023-10-20 RX ORDER — PALONOSETRON 0.05 MG/ML
0.25 INJECTION, SOLUTION INTRAVENOUS ONCE
Status: COMPLETED | OUTPATIENT
Start: 2023-10-20 | End: 2023-10-20

## 2023-10-20 RX ORDER — HEPARIN 100 UNIT/ML
500 SYRINGE INTRAVENOUS AS NEEDED
Status: DISCONTINUED | OUTPATIENT
Start: 2023-10-20 | End: 2023-10-20 | Stop reason: HOSPADM

## 2023-10-20 RX ORDER — EPINEPHRINE 0.3 MG/.3ML
0.3 INJECTION SUBCUTANEOUS EVERY 5 MIN PRN
Status: DISCONTINUED | OUTPATIENT
Start: 2023-10-20 | End: 2023-10-20 | Stop reason: HOSPADM

## 2023-10-20 RX ORDER — FAMOTIDINE 10 MG/ML
20 INJECTION INTRAVENOUS ONCE AS NEEDED
Status: DISCONTINUED | OUTPATIENT
Start: 2023-10-20 | End: 2023-10-20 | Stop reason: HOSPADM

## 2023-10-20 RX ORDER — PROCHLORPERAZINE EDISYLATE 5 MG/ML
10 INJECTION INTRAMUSCULAR; INTRAVENOUS EVERY 6 HOURS PRN
Status: DISCONTINUED | OUTPATIENT
Start: 2023-10-20 | End: 2023-10-20 | Stop reason: HOSPADM

## 2023-10-20 RX ORDER — DIPHENHYDRAMINE HYDROCHLORIDE 50 MG/ML
50 INJECTION INTRAMUSCULAR; INTRAVENOUS AS NEEDED
Status: DISCONTINUED | OUTPATIENT
Start: 2023-10-20 | End: 2023-10-20 | Stop reason: HOSPADM

## 2023-10-20 RX ORDER — HEPARIN 100 UNIT/ML
500 SYRINGE INTRAVENOUS AS NEEDED
Status: CANCELLED | OUTPATIENT
Start: 2023-10-20

## 2023-10-20 RX ADMIN — DEXAMETHASONE SODIUM PHOSPHATE 12 MG: 10 INJECTION, SOLUTION INTRAMUSCULAR; INTRAVENOUS at 10:12

## 2023-10-20 RX ADMIN — HEPARIN 500 UNITS: 100 SYRINGE at 11:28

## 2023-10-20 RX ADMIN — PALONOSETRON HYDROCHLORIDE 250 MCG: 0.25 INJECTION INTRAVENOUS at 10:09

## 2023-10-20 RX ADMIN — FAM-TRASTUZUMAB DERUXTECAN-NXKI 400 MG: 100 INJECTION, POWDER, LYOPHILIZED, FOR SOLUTION INTRAVENOUS at 10:48

## 2023-10-20 ASSESSMENT — PAIN SCALES - GENERAL: PAINLEVEL: 0-NO PAIN

## 2023-11-07 ENCOUNTER — HOSPITAL ENCOUNTER (OUTPATIENT)
Dept: CARDIOLOGY | Facility: HOSPITAL | Age: 63
Discharge: HOME | End: 2023-11-07
Payer: MEDICARE

## 2023-11-07 ENCOUNTER — LAB (OUTPATIENT)
Dept: HEMATOLOGY/ONCOLOGY | Facility: HOSPITAL | Age: 63
End: 2023-11-07
Payer: MEDICARE

## 2023-11-07 DIAGNOSIS — C50.911 MALIGNANT NEOPLASM OF UNSPECIFIED SITE OF RIGHT FEMALE BREAST (MULTI): ICD-10-CM

## 2023-11-07 DIAGNOSIS — C50.919 MALIGNANT NEOPLASM OF FEMALE BREAST, UNSPECIFIED ESTROGEN RECEPTOR STATUS, UNSPECIFIED LATERALITY, UNSPECIFIED SITE OF BREAST (MULTI): ICD-10-CM

## 2023-11-07 DIAGNOSIS — Z51.81 ENCOUNTER FOR MONITORING CARDIOTOXIC DRUG THERAPY: ICD-10-CM

## 2023-11-07 DIAGNOSIS — Z79.899 ENCOUNTER FOR MONITORING CARDIOTOXIC DRUG THERAPY: ICD-10-CM

## 2023-11-07 LAB
ALBUMIN SERPL BCP-MCNC: 4.2 G/DL (ref 3.4–5)
ALP SERPL-CCNC: 91 U/L (ref 33–136)
ALT SERPL W P-5'-P-CCNC: 26 U/L (ref 7–45)
ANION GAP SERPL CALC-SCNC: 11 MMOL/L (ref 10–20)
AST SERPL W P-5'-P-CCNC: 41 U/L (ref 9–39)
BASO STIPL BLD QL SMEAR: PRESENT
BASOPHILS # BLD AUTO: 0.11 X10*3/UL (ref 0–0.1)
BASOPHILS NFR BLD AUTO: 2 %
BILIRUB SERPL-MCNC: 1.7 MG/DL (ref 0–1.2)
BUN SERPL-MCNC: 11 MG/DL (ref 6–23)
CALCIUM SERPL-MCNC: 9.7 MG/DL (ref 8.6–10.3)
CHLORIDE SERPL-SCNC: 100 MMOL/L (ref 98–107)
CO2 SERPL-SCNC: 29 MMOL/L (ref 21–32)
CREAT SERPL-MCNC: 0.51 MG/DL (ref 0.5–1.05)
DACRYOCYTES BLD QL SMEAR: NORMAL
EOSINOPHIL # BLD AUTO: 0.33 X10*3/UL (ref 0–0.7)
EOSINOPHIL NFR BLD AUTO: 5.9 %
ERYTHROCYTE [DISTWIDTH] IN BLOOD BY AUTOMATED COUNT: 24.1 % (ref 11.5–14.5)
GFR SERPL CREATININE-BSD FRML MDRD: >90 ML/MIN/1.73M*2
GLUCOSE SERPL-MCNC: 151 MG/DL (ref 74–99)
HCT VFR BLD AUTO: 32.1 % (ref 36–46)
HGB BLD-MCNC: 10.3 G/DL (ref 12–16)
IMM GRANULOCYTES # BLD AUTO: 0.02 X10*3/UL (ref 0–0.7)
IMM GRANULOCYTES NFR BLD AUTO: 0.4 % (ref 0–0.9)
LYMPHOCYTES # BLD AUTO: 1.36 X10*3/UL (ref 1.2–4.8)
LYMPHOCYTES NFR BLD AUTO: 24.2 %
MCH RBC QN AUTO: 25.1 PG (ref 26–34)
MCHC RBC AUTO-ENTMCNC: 32.1 G/DL (ref 32–36)
MCV RBC AUTO: 78 FL (ref 80–100)
MONOCYTES # BLD AUTO: 0.61 X10*3/UL (ref 0.1–1)
MONOCYTES NFR BLD AUTO: 10.8 %
NEUTROPHILS # BLD AUTO: 3.2 X10*3/UL (ref 1.2–7.7)
NEUTROPHILS NFR BLD AUTO: 56.7 %
NRBC BLD-RTO: 0 /100 WBCS (ref 0–0)
OVALOCYTES BLD QL SMEAR: NORMAL
PLATELET # BLD AUTO: 249 X10*3/UL (ref 150–450)
POLYCHROMASIA BLD QL SMEAR: NORMAL
POTASSIUM SERPL-SCNC: 3.4 MMOL/L (ref 3.5–5.3)
PROT SERPL-MCNC: 7.2 G/DL (ref 6.4–8.2)
RBC # BLD AUTO: 4.1 X10*6/UL (ref 4–5.2)
RBC MORPH BLD: NORMAL
SCHISTOCYTES BLD QL SMEAR: NORMAL
SODIUM SERPL-SCNC: 137 MMOL/L (ref 136–145)
TARGETS BLD QL SMEAR: NORMAL
WBC # BLD AUTO: 5.6 X10*3/UL (ref 4.4–11.3)

## 2023-11-07 PROCEDURE — 2500000004 HC RX 250 GENERAL PHARMACY W/ HCPCS (ALT 636 FOR OP/ED): Performed by: PHARMACIST

## 2023-11-07 PROCEDURE — 80053 COMPREHEN METABOLIC PANEL: CPT

## 2023-11-07 PROCEDURE — 93306 TTE W/DOPPLER COMPLETE: CPT | Performed by: INTERNAL MEDICINE

## 2023-11-07 PROCEDURE — 36591 DRAW BLOOD OFF VENOUS DEVICE: CPT

## 2023-11-07 PROCEDURE — 93306 TTE W/DOPPLER COMPLETE: CPT

## 2023-11-07 PROCEDURE — 85025 COMPLETE CBC W/AUTO DIFF WBC: CPT

## 2023-11-07 RX ORDER — HEPARIN SODIUM,PORCINE/PF 10 UNIT/ML
50 SYRINGE (ML) INTRAVENOUS AS NEEDED
Status: CANCELLED | OUTPATIENT
Start: 2023-11-07

## 2023-11-07 RX ORDER — HEPARIN 100 UNIT/ML
500 SYRINGE INTRAVENOUS AS NEEDED
Status: CANCELLED | OUTPATIENT
Start: 2023-11-07

## 2023-11-07 RX ORDER — HEPARIN 100 UNIT/ML
500 SYRINGE INTRAVENOUS AS NEEDED
Status: DISCONTINUED | OUTPATIENT
Start: 2023-11-07 | End: 2023-11-29 | Stop reason: HOSPADM

## 2023-11-07 RX ADMIN — HEPARIN 500 UNITS: 100 SYRINGE at 12:47

## 2023-11-08 LAB
AORTIC VALVE PEAK VELOCITY: 1.38
AV PEAK GRADIENT: 7.6
AVA (PEAK VEL): 2.41
EJECTION FRACTION APICAL 4 CHAMBER: 61.3
EJECTION FRACTION: 63
LEFT ATRIUM VOLUME AREA LENGTH INDEX BSA: 25.5
LEFT VENTRICLE INTERNAL DIMENSION DIASTOLE: 5.5 (ref 3.5–6)
LEFT VENTRICULAR OUTFLOW TRACT DIAMETER: 2
MITRAL VALVE E/A RATIO: 0.68
MITRAL VALVE E/E' RATIO: 7.1
RIGHT VENTRICLE FREE WALL PEAK S': 13.3
TRICUSPID ANNULAR PLANE SYSTOLIC EXCURSION: 2.2

## 2023-11-10 ENCOUNTER — INFUSION (OUTPATIENT)
Dept: HEMATOLOGY/ONCOLOGY | Facility: HOSPITAL | Age: 63
End: 2023-11-10
Payer: MEDICARE

## 2023-11-10 ENCOUNTER — APPOINTMENT (OUTPATIENT)
Dept: HEMATOLOGY/ONCOLOGY | Facility: HOSPITAL | Age: 63
End: 2023-11-10
Payer: COMMERCIAL

## 2023-11-10 VITALS
SYSTOLIC BLOOD PRESSURE: 120 MMHG | TEMPERATURE: 97.2 F | OXYGEN SATURATION: 98 % | WEIGHT: 195.99 LBS | HEART RATE: 71 BPM | BODY MASS INDEX: 33.13 KG/M2 | RESPIRATION RATE: 16 BRPM | DIASTOLIC BLOOD PRESSURE: 74 MMHG

## 2023-11-10 DIAGNOSIS — C50.919 MALIGNANT NEOPLASM OF FEMALE BREAST, UNSPECIFIED ESTROGEN RECEPTOR STATUS, UNSPECIFIED LATERALITY, UNSPECIFIED SITE OF BREAST (MULTI): ICD-10-CM

## 2023-11-10 PROCEDURE — 2500000004 HC RX 250 GENERAL PHARMACY W/ HCPCS (ALT 636 FOR OP/ED): Performed by: INTERNAL MEDICINE

## 2023-11-10 PROCEDURE — 2500000004 HC RX 250 GENERAL PHARMACY W/ HCPCS (ALT 636 FOR OP/ED): Mod: JZ | Performed by: INTERNAL MEDICINE

## 2023-11-10 PROCEDURE — 2500000004 HC RX 250 GENERAL PHARMACY W/ HCPCS (ALT 636 FOR OP/ED): Performed by: PHARMACIST

## 2023-11-10 PROCEDURE — 96413 CHEMO IV INFUSION 1 HR: CPT

## 2023-11-10 PROCEDURE — 96375 TX/PRO/DX INJ NEW DRUG ADDON: CPT | Mod: INF

## 2023-11-10 RX ORDER — PROCHLORPERAZINE MALEATE 10 MG
10 TABLET ORAL EVERY 6 HOURS PRN
Status: DISCONTINUED | OUTPATIENT
Start: 2023-11-10 | End: 2023-11-10 | Stop reason: HOSPADM

## 2023-11-10 RX ORDER — HEPARIN SODIUM,PORCINE/PF 10 UNIT/ML
50 SYRINGE (ML) INTRAVENOUS AS NEEDED
Status: DISCONTINUED | OUTPATIENT
Start: 2023-11-10 | End: 2023-11-10 | Stop reason: HOSPADM

## 2023-11-10 RX ORDER — EPINEPHRINE 0.3 MG/.3ML
0.3 INJECTION SUBCUTANEOUS EVERY 5 MIN PRN
Status: DISCONTINUED | OUTPATIENT
Start: 2023-11-10 | End: 2023-11-10 | Stop reason: HOSPADM

## 2023-11-10 RX ORDER — FAMOTIDINE 10 MG/ML
20 INJECTION INTRAVENOUS ONCE AS NEEDED
Status: DISCONTINUED | OUTPATIENT
Start: 2023-11-10 | End: 2023-11-10 | Stop reason: HOSPADM

## 2023-11-10 RX ORDER — PROCHLORPERAZINE EDISYLATE 5 MG/ML
10 INJECTION INTRAMUSCULAR; INTRAVENOUS EVERY 6 HOURS PRN
Status: DISCONTINUED | OUTPATIENT
Start: 2023-11-10 | End: 2023-11-10 | Stop reason: HOSPADM

## 2023-11-10 RX ORDER — HEPARIN 100 UNIT/ML
500 SYRINGE INTRAVENOUS AS NEEDED
Status: DISCONTINUED | OUTPATIENT
Start: 2023-11-10 | End: 2023-11-10 | Stop reason: HOSPADM

## 2023-11-10 RX ORDER — HEPARIN 100 UNIT/ML
500 SYRINGE INTRAVENOUS AS NEEDED
Status: CANCELLED | OUTPATIENT
Start: 2023-11-10

## 2023-11-10 RX ORDER — DIPHENHYDRAMINE HYDROCHLORIDE 50 MG/ML
50 INJECTION INTRAMUSCULAR; INTRAVENOUS AS NEEDED
Status: DISCONTINUED | OUTPATIENT
Start: 2023-11-10 | End: 2023-11-10 | Stop reason: HOSPADM

## 2023-11-10 RX ORDER — PALONOSETRON 0.05 MG/ML
0.25 INJECTION, SOLUTION INTRAVENOUS ONCE
Status: COMPLETED | OUTPATIENT
Start: 2023-11-10 | End: 2023-11-10

## 2023-11-10 RX ORDER — ALBUTEROL SULFATE 0.83 MG/ML
3 SOLUTION RESPIRATORY (INHALATION) AS NEEDED
Status: DISCONTINUED | OUTPATIENT
Start: 2023-11-10 | End: 2023-11-10 | Stop reason: HOSPADM

## 2023-11-10 RX ORDER — HEPARIN SODIUM,PORCINE/PF 10 UNIT/ML
50 SYRINGE (ML) INTRAVENOUS AS NEEDED
Status: CANCELLED | OUTPATIENT
Start: 2023-11-10

## 2023-11-10 RX ADMIN — PALONOSETRON HYDROCHLORIDE 250 MCG: 0.25 INJECTION INTRAVENOUS at 10:00

## 2023-11-10 RX ADMIN — FAM-TRASTUZUMAB DERUXTECAN-NXKI 400 MG: 100 INJECTION, POWDER, LYOPHILIZED, FOR SOLUTION INTRAVENOUS at 11:03

## 2023-11-10 RX ADMIN — HEPARIN 500 UNITS: 100 SYRINGE at 11:40

## 2023-11-10 RX ADMIN — DEXAMETHASONE SODIUM PHOSPHATE 12 MG: 10 INJECTION, SOLUTION INTRAMUSCULAR; INTRAVENOUS at 10:00

## 2023-11-10 ASSESSMENT — PAIN SCALES - GENERAL: PAINLEVEL: 0-NO PAIN

## 2023-11-10 NOTE — PROGRESS NOTES
Chio Turner is a 62 y.o. female who presents for Chemotherapy.    She is on the following chemotherapy regimen:     Treatment Plans       Name Type Plan Dates Plan Provider         Active    Fam-trastuzumab deruxtecan, 21 Day Cycles - Breast Oncology Treatment  2/9/2023 - Present Alisha Kahn MD                  .    Since her last visit, she has been doing well.  Overall, she states her energy level is stable.  Her appetite has been unchanged and good.  She reports no complaints.  She has no other concerns.    Lines of note:  implanted port right chest intact.  Site Maintenance: Flushed and Positive blood return.  Line Removal: Yes, avila needle flushed and removed per policy.    Pt tolerated treatment without any complications.  Discharged home with family in stable condition.

## 2023-11-16 ENCOUNTER — OFFICE VISIT (OUTPATIENT)
Dept: GASTROENTEROLOGY | Facility: CLINIC | Age: 63
End: 2023-11-16
Payer: MEDICARE

## 2023-11-16 VITALS — OXYGEN SATURATION: 96 % | HEART RATE: 103 BPM | HEIGHT: 64 IN | BODY MASS INDEX: 32.27 KG/M2 | WEIGHT: 189 LBS

## 2023-11-16 DIAGNOSIS — E88.89 STEATOSIS (MULTI): Primary | ICD-10-CM

## 2023-11-16 PROCEDURE — 1036F TOBACCO NON-USER: CPT | Performed by: INTERNAL MEDICINE

## 2023-11-16 PROCEDURE — 99214 OFFICE O/P EST MOD 30 MIN: CPT | Performed by: INTERNAL MEDICINE

## 2023-11-16 NOTE — PROGRESS NOTES
"Subjective     History of Present Illness:   Chio Turner is a 62 y.o. female with PMHx of steatosis  who presents to GI clinic for follow up.   Following a low CHO diet.   Some decrease in appetite, she attributes to immunotherapy.   No RUQ symptoms.   Had \"bad acid reflux\" while in [place]    Review of Systems  Review of Systems    Allergies  Allergies   Allergen Reactions    Penicillins Hives and Unknown       Medications  Current Outpatient Medications   Medication Instructions    amLODIPine (Norvasc) 5 mg tablet 1 tablet, oral, Daily    calcium carbonate-vitamin D3 1,000 mg-20 mcg (800 unit) tablet oral, 2 times daily    chlorthalidone (Hygroton) 25 mg tablet 1 tablet, oral, Daily    cholecalciferol (Vitamin D-3) 25 MCG (1000 UT) capsule 1 capsule, oral, Daily    hydroCHLOROthiazide (HYDRODiuril) 25 mg tablet 1 tablet, oral, Daily    levothyroxine (Synthroid, Levoxyl) 88 mcg tablet     magnesium oxide (Mag-Ox) 400 mg (241.3 mg magnesium) tablet 1 tablet, oral, Daily    multivit-min-iron-FA-vit K-lut (Centrum Silver Women) 8 mg iron-400 mcg-50 mcg tablet 1 tablet, oral, Daily    multivit-minerals/folic acid (CENTRUM ADULTS ORAL) 1 tablet, oral, Daily    NON FORMULARY 1 capsule, oral, 2 times daily,  Turmeric Curcumin Oral Capsule    ondansetron (Zofran) 8 mg tablet 1 tablet, oral, Every 8 hours PRN    PaxiL 10 mg tablet 1 tablet, oral, Daily    potassium chloride (Klor-Con) 20 mEq packet 1 packet, oral, Daily    potassium chloride CR 20 mEq ER tablet 2 tablets, oral, 2 times daily        Objective   Visit Vitals  Pulse 103      Physical Exam    144/82  Lungs clear  CV rrr, no mrg  Abd - soft, nontender - liver edge palpable in RUQ       Lab Results   Component Value Date    WBC 5.6 11/07/2023    WBC 5.6 10/17/2023    WBC 5.5 09/26/2023    HGB 10.3 (L) 11/07/2023    HGB 10.8 (L) 10/17/2023    HGB 10.5 (L) 09/26/2023    HCT 32.1 (L) 11/07/2023    HCT 32.7 (L) 10/17/2023    HCT 32.0 (L) 09/26/2023     " 11/07/2023     10/17/2023     09/26/2023     Lab Results   Component Value Date     11/07/2023     10/17/2023     09/26/2023    K 3.4 (L) 11/07/2023    K 3.4 (L) 10/17/2023    K 3.5 09/26/2023     11/07/2023    CL 99 10/17/2023     09/26/2023    CO2 29 11/07/2023    CO2 31 10/17/2023    CO2 30 09/26/2023    BUN 11 11/07/2023    BUN 10 10/17/2023    BUN 12 09/26/2023    CREATININE 0.51 11/07/2023    CREATININE 0.59 10/17/2023    CREATININE 0.54 09/26/2023    CALCIUM 9.7 11/07/2023    CALCIUM 9.6 10/17/2023    CALCIUM 9.1 09/26/2023    PROT 7.2 11/07/2023    PROT 7.5 10/17/2023    PROT 7.1 09/26/2023    BILITOT 1.7 (H) 11/07/2023    BILITOT 1.4 (H) 10/17/2023    BILITOT 1.3 (H) 09/26/2023    ALKPHOS 91 11/07/2023    ALKPHOS 80 10/17/2023    ALKPHOS 82 09/26/2023    ALT 26 11/07/2023    ALT 30 10/17/2023    ALT 23 09/26/2023    AST 41 (H) 11/07/2023    AST 47 (H) 10/17/2023    AST 34 09/26/2023    GLUCOSE 151 (H) 11/07/2023    GLUCOSE 135 (H) 10/17/2023    GLUCOSE 101 (H) 09/26/2023    CHOL 284 (H) 07/22/2022    LDLF 193 (H) 07/22/2022    CHHDL 5.3 (A) 07/22/2022     Transthoracic Echo (TTE) Complete     [facility], [address] [place] [address]                 Tel [phone] and Fax [phone]    TRANSTHORACIC ECHOCARDIOGRAM REPORT       Patient Name:      TJ Lora Physician:    [id number] Yaw Kern MD  Study Date:        11/7/2023            Ordering Provider:    [id number] OTTONIEL POWERS  MRN/PID:           [id number]             Fellow:  Accession#:        [id number]         Nurse:  Date of Birth/Age: 1960 / 62      Sonographer:          Shanice smith RDCS  Gender:            F                    Additional Staff:  Height:             162.56 cm            Admit Date:  Weight:            89.36 kg             Admission Status:     Outpatient  BSA:               1.94 m2              Encounter#:           [id number]                                          Department Location:  [facility] Non                                                                Invasive  Blood Pressure: 159 /91 mmHg    Study Type:    TRANSTHORACIC ECHO (TTE) COMPLETE  Diagnosis/ICD: Malignant neoplasm of unspecified site of right female                 breast-C50.911  Indication:    Malignant neoplasm of female breast  CPT Code:      Echo Complete w Full Doppler-[id number]    Patient History:  Pertinent History: HTN and DM. Breast cancer of right breast with mets to both                     lungs; chemotherapy.    Study Detail: The following Echo studies were performed: 2D, M-Mode, Doppler and                color flow.       PHYSICIAN INTERPRETATION:  Left Ventricle: The left ventricular systolic function is normal, with an estimated ejection fraction of 65%. There are no regional wall motion abnormalities. The left ventricular cavity size is upper limits of normal. Spectral Doppler shows an impaired relaxation pattern of left ventricular diastolic filling.  Left Atrium: The left atrium is normal in size.  Right Ventricle: The right ventricle is normal in size. There is normal right ventricular global systolic function.  Right Atrium: The right atrium is normal in size.  Aortic Valve: The aortic valve is probably trileaflet. There is trivial aortic valve regurgitation. The peak instantaneous gradient of the aortic valve is 7.6 mmHg.  Mitral Valve: The mitral valve is normal in structure. There is trace mitral valve regurgitation.  Tricuspid Valve: The tricuspid valve is structurally normal. There is trace tricuspid regurgitation.  Pulmonic Valve: The pulmonic valve is not well visualized. There is trace pulmonic valve regurgitation.  Pericardium: There is a trivial  pericardial effusion.  Aorta: The aortic root is normal. There is no dilatation of the ascending aorta. There is no dilatation of the aortic root. There is plaque visualized in the ascending aorta, which is classified as a Grade 2 [mild (focal or diffuse) intimal thickening of 2-3 mm] atherosclerosis.  Systemic Veins: The inferior vena cava appears to be of normal size.  In comparison to the previous echocardiogram(s): Compared with study from 8/10/2023,.       CONCLUSIONS:   1. Left ventricular systolic function is normal with a 65% estimated ejection fraction.   2. Spectral Doppler shows an impaired relaxation pattern of left ventricular diastolic filling.   3. There is plaque visualized in the ascending aorta.    QUANTITATIVE DATA SUMMARY:  2D MEASUREMENTS:                           Normal Ranges:  IVSd:          0.60 cm   (0.6-1.1cm)  LVPWd:         0.60 cm   (0.6-1.1cm)  LVIDd:         5.50 cm   (3.9-5.9cm)  LVIDs:         3.60 cm  LV Mass Index: 57.8 g/m2  LV % FS        34.5 %    LA VOLUME:                                Normal Ranges:  LA Vol A4C:        47.4 ml    (22+/-6mL/m2)  LA Vol A2C:        51.0 ml  LA Vol BP:         49.6 ml  LA Vol Index A4C:  24.4ml/m2  LA Vol Index A2C:  26.3 ml/m2  LA Vol Index BP:   25.5 ml/m2  LA Area A4C:       17.6 cm2  LA Area A2C:       18.4 cm2  LA Major Axis A4C: 5.6 cm  LA Major Axis A2C: 5.6 cm    RA VOLUME BY A/L METHOD:                        Normal Ranges:  RA Area A4C: 13.9 cm2    M-MODE MEASUREMENTS:                   Normal Ranges:  Ao Root: 3.70 cm (2.0-3.7cm)  LAs:     4.50 cm (2.7-4.0cm)    AORTA MEASUREMENTS:                       Normal Ranges:  Ao Sinus, d: 3.28 cm (2.1-3.5cm)  Asc Ao, d:   3.42 cm (2.1-3.4cm)    LV SYSTOLIC FUNCTION BY 2D PLANIMETRY (MOD):                      Normal Ranges:  EF-A4C View: 61.3 % (>=55%)  EF-A2C View: 63.7 %  EF-Biplane:  62.7 %    LV DIASTOLIC FUNCTION:                                Normal Ranges:  MV Peak E:        0.50  m/s    (0.7-1.2 m/s)  MV Peak A:        0.73 m/s    (0.42-0.7 m/s)  E/A Ratio:        0.68        (1.0-2.2)  MV e'             0.07 m/s    (>8.0)  MV lateral e'     0.08 m/s  MV medial e'      0.06 m/s  MV A Dur:         130.00 msec  E/e' Ratio:       7.10        (<8.0)  PulmV Sys Mahendra:    54.80 cm/s  PulmV Quiles Mahendra:   31.30 cm/s  PulmV S/D Mahendra:    1.80  PulmV A Revs Mahendra: 28.30 cm/s  PulmV A Revs Dur: 148.00 msec    MITRAL VALVE:                  Normal Ranges:  MV DT: 208 msec (150-240msec)    AORTIC VALVE:                          Normal Ranges:  AoV Vmax:      1.38 m/s (<=1.7m/s)  AoV Peak P.6 mmHg (<20mmHg)  LVOT Max Mahendra:  1.06 m/s (<=1.1m/s)  LVOT VTI:      21.40 cm  LVOT Diameter: 2.00 cm  (1.8-2.4cm)  AoV Area,Vmax: 2.41 cm2 (2.5-4.5cm2)       RIGHT VENTRICLE:  RV Basal 4.10 cm  RV Mid   2.95 cm  RV Major 7.3 cm  TAPSE:   21.6 mm  RV s'    0.13 m/s    TRICUSPID VALVE/RVSP:                    Normal Ranges:  IVC Diam: 1.90 cm    PULMONIC VALVE:                          Normal Ranges:  PV Accel Time: 130 msec (>120ms)  PV Max Mahendra:    1.1 m/s  (0.6-0.9m/s)  PV Max P.5 mmHg    Pulmonary Veins:  PulmV A Revs Dur: 148.00 msec  PulmV A Revs Mahendra: 28.30 cm/s  PulmV Quiles Mahendra:   31.30 cm/s  PulmV S/D Mahendra:    1.80  PulmV Sys Mahendra:    54.80 cm/s       [id number] Yaw Kern MD  Electronically signed on 2023 at 2:17:49 PM       ** Final **           Chio Turner is a 62 y.o. female for follow up of steatosis.   Steady weight loss on low CHO diet - liver enz normal, x borderline high AST and Tbili - rec: continue with diet, return in 6 months - will take prilosec for reflux symptoms, call if increased       Naresh Bermeo MD

## 2023-11-27 DIAGNOSIS — C78.02 CARCINOMA OF LEFT BREAST METASTATIC TO LUNG (MULTI): Primary | ICD-10-CM

## 2023-11-27 DIAGNOSIS — C50.912 CARCINOMA OF LEFT BREAST METASTATIC TO LUNG (MULTI): Primary | ICD-10-CM

## 2023-11-28 ENCOUNTER — LAB (OUTPATIENT)
Dept: HEMATOLOGY/ONCOLOGY | Facility: HOSPITAL | Age: 63
End: 2023-11-28
Payer: MEDICARE

## 2023-11-28 DIAGNOSIS — C50.912 CARCINOMA OF LEFT BREAST METASTATIC TO LUNG (MULTI): ICD-10-CM

## 2023-11-28 DIAGNOSIS — C50.919 MALIGNANT NEOPLASM OF FEMALE BREAST, UNSPECIFIED ESTROGEN RECEPTOR STATUS, UNSPECIFIED LATERALITY, UNSPECIFIED SITE OF BREAST (MULTI): ICD-10-CM

## 2023-11-28 DIAGNOSIS — C78.02 CARCINOMA OF LEFT BREAST METASTATIC TO LUNG (MULTI): ICD-10-CM

## 2023-11-28 LAB
ALBUMIN SERPL BCP-MCNC: 4.3 G/DL (ref 3.4–5)
ALP SERPL-CCNC: 96 U/L (ref 33–136)
ALT SERPL W P-5'-P-CCNC: 28 U/L (ref 7–45)
ANION GAP SERPL CALC-SCNC: 15 MMOL/L (ref 10–20)
AST SERPL W P-5'-P-CCNC: 42 U/L (ref 9–39)
BASOPHILS # BLD AUTO: 0.1 X10*3/UL (ref 0–0.1)
BASOPHILS NFR BLD AUTO: 1.4 %
BILIRUB SERPL-MCNC: 1.6 MG/DL (ref 0–1.2)
BUN SERPL-MCNC: 14 MG/DL (ref 6–23)
CALCIUM SERPL-MCNC: 9.9 MG/DL (ref 8.6–10.3)
CHLORIDE SERPL-SCNC: 101 MMOL/L (ref 98–107)
CO2 SERPL-SCNC: 27 MMOL/L (ref 21–32)
CREAT SERPL-MCNC: 0.58 MG/DL (ref 0.5–1.05)
DACRYOCYTES BLD QL SMEAR: NORMAL
EOSINOPHIL # BLD AUTO: 0.43 X10*3/UL (ref 0–0.7)
EOSINOPHIL NFR BLD AUTO: 6.1 %
ERYTHROCYTE [DISTWIDTH] IN BLOOD BY AUTOMATED COUNT: 24.7 % (ref 11.5–14.5)
GFR SERPL CREATININE-BSD FRML MDRD: >90 ML/MIN/1.73M*2
GLUCOSE SERPL-MCNC: 101 MG/DL (ref 74–99)
HCT VFR BLD AUTO: 32.1 % (ref 36–46)
HGB BLD-MCNC: 10.6 G/DL (ref 12–16)
IMM GRANULOCYTES # BLD AUTO: 0.02 X10*3/UL (ref 0–0.7)
IMM GRANULOCYTES NFR BLD AUTO: 0.3 % (ref 0–0.9)
LYMPHOCYTES # BLD AUTO: 1.4 X10*3/UL (ref 1.2–4.8)
LYMPHOCYTES NFR BLD AUTO: 19.9 %
MCH RBC QN AUTO: 25.9 PG (ref 26–34)
MCHC RBC AUTO-ENTMCNC: 33 G/DL (ref 32–36)
MCV RBC AUTO: 78 FL (ref 80–100)
MONOCYTES # BLD AUTO: 0.75 X10*3/UL (ref 0.1–1)
MONOCYTES NFR BLD AUTO: 10.7 %
NEUTROPHILS # BLD AUTO: 4.33 X10*3/UL (ref 1.2–7.7)
NEUTROPHILS NFR BLD AUTO: 61.6 %
NRBC BLD-RTO: 0 /100 WBCS (ref 0–0)
OVALOCYTES BLD QL SMEAR: NORMAL
PLATELET # BLD AUTO: 236 X10*3/UL (ref 150–450)
POLYCHROMASIA BLD QL SMEAR: NORMAL
POTASSIUM SERPL-SCNC: 3.7 MMOL/L (ref 3.5–5.3)
PROT SERPL-MCNC: 7.6 G/DL (ref 6.4–8.2)
RBC # BLD AUTO: 4.1 X10*6/UL (ref 4–5.2)
RBC MORPH BLD: NORMAL
SCHISTOCYTES BLD QL SMEAR: NORMAL
SODIUM SERPL-SCNC: 139 MMOL/L (ref 136–145)
WBC # BLD AUTO: 7 X10*3/UL (ref 4.4–11.3)

## 2023-11-28 PROCEDURE — 85025 COMPLETE CBC W/AUTO DIFF WBC: CPT

## 2023-11-28 PROCEDURE — 2500000004 HC RX 250 GENERAL PHARMACY W/ HCPCS (ALT 636 FOR OP/ED): Performed by: PHARMACIST

## 2023-11-28 PROCEDURE — 96523 IRRIG DRUG DELIVERY DEVICE: CPT

## 2023-11-28 PROCEDURE — 84075 ASSAY ALKALINE PHOSPHATASE: CPT

## 2023-11-28 RX ORDER — HEPARIN 100 UNIT/ML
500 SYRINGE INTRAVENOUS AS NEEDED
Status: DISCONTINUED | OUTPATIENT
Start: 2023-11-28 | End: 2023-11-28 | Stop reason: HOSPADM

## 2023-11-28 RX ORDER — HEPARIN SODIUM,PORCINE/PF 10 UNIT/ML
50 SYRINGE (ML) INTRAVENOUS AS NEEDED
Status: CANCELLED | OUTPATIENT
Start: 2023-11-28

## 2023-11-28 RX ORDER — HEPARIN 100 UNIT/ML
500 SYRINGE INTRAVENOUS AS NEEDED
Status: CANCELLED | OUTPATIENT
Start: 2023-11-28

## 2023-11-28 RX ADMIN — HEPARIN 500 UNITS: 100 SYRINGE at 12:14

## 2023-12-01 ENCOUNTER — INFUSION (OUTPATIENT)
Dept: HEMATOLOGY/ONCOLOGY | Facility: HOSPITAL | Age: 63
End: 2023-12-01
Payer: MEDICARE

## 2023-12-01 ENCOUNTER — APPOINTMENT (OUTPATIENT)
Dept: HEMATOLOGY/ONCOLOGY | Facility: HOSPITAL | Age: 63
End: 2023-12-01
Payer: MEDICARE

## 2023-12-01 VITALS
TEMPERATURE: 97.7 F | BODY MASS INDEX: 32.76 KG/M2 | OXYGEN SATURATION: 98 % | DIASTOLIC BLOOD PRESSURE: 85 MMHG | WEIGHT: 193.78 LBS | RESPIRATION RATE: 18 BRPM | SYSTOLIC BLOOD PRESSURE: 136 MMHG | HEART RATE: 68 BPM

## 2023-12-01 DIAGNOSIS — C50.919 MALIGNANT NEOPLASM OF FEMALE BREAST, UNSPECIFIED ESTROGEN RECEPTOR STATUS, UNSPECIFIED LATERALITY, UNSPECIFIED SITE OF BREAST (MULTI): Primary | ICD-10-CM

## 2023-12-01 PROCEDURE — 96375 TX/PRO/DX INJ NEW DRUG ADDON: CPT | Mod: INF

## 2023-12-01 PROCEDURE — 2500000004 HC RX 250 GENERAL PHARMACY W/ HCPCS (ALT 636 FOR OP/ED): Performed by: INTERNAL MEDICINE

## 2023-12-01 PROCEDURE — 2500000004 HC RX 250 GENERAL PHARMACY W/ HCPCS (ALT 636 FOR OP/ED): Performed by: PHARMACIST

## 2023-12-01 PROCEDURE — 96413 CHEMO IV INFUSION 1 HR: CPT

## 2023-12-01 RX ORDER — EPINEPHRINE 0.3 MG/.3ML
0.3 INJECTION SUBCUTANEOUS EVERY 5 MIN PRN
Status: DISCONTINUED | OUTPATIENT
Start: 2023-12-01 | End: 2023-12-01 | Stop reason: HOSPADM

## 2023-12-01 RX ORDER — HEPARIN SODIUM,PORCINE/PF 10 UNIT/ML
50 SYRINGE (ML) INTRAVENOUS AS NEEDED
Status: DISCONTINUED | OUTPATIENT
Start: 2023-12-01 | End: 2023-12-01 | Stop reason: HOSPADM

## 2023-12-01 RX ORDER — HEPARIN SODIUM,PORCINE/PF 10 UNIT/ML
50 SYRINGE (ML) INTRAVENOUS AS NEEDED
Status: CANCELLED | OUTPATIENT
Start: 2023-12-01

## 2023-12-01 RX ORDER — PROCHLORPERAZINE MALEATE 10 MG
10 TABLET ORAL EVERY 6 HOURS PRN
Status: DISCONTINUED | OUTPATIENT
Start: 2023-12-01 | End: 2023-12-01 | Stop reason: HOSPADM

## 2023-12-01 RX ORDER — FAMOTIDINE 10 MG/ML
20 INJECTION INTRAVENOUS ONCE AS NEEDED
Status: DISCONTINUED | OUTPATIENT
Start: 2023-12-01 | End: 2023-12-01 | Stop reason: HOSPADM

## 2023-12-01 RX ORDER — PALONOSETRON 0.05 MG/ML
0.25 INJECTION, SOLUTION INTRAVENOUS ONCE
Status: COMPLETED | OUTPATIENT
Start: 2023-12-01 | End: 2023-12-01

## 2023-12-01 RX ORDER — HEPARIN 100 UNIT/ML
500 SYRINGE INTRAVENOUS AS NEEDED
Status: CANCELLED | OUTPATIENT
Start: 2023-12-01

## 2023-12-01 RX ORDER — DIPHENHYDRAMINE HYDROCHLORIDE 50 MG/ML
50 INJECTION INTRAMUSCULAR; INTRAVENOUS AS NEEDED
Status: DISCONTINUED | OUTPATIENT
Start: 2023-12-01 | End: 2023-12-01 | Stop reason: HOSPADM

## 2023-12-01 RX ORDER — ALBUTEROL SULFATE 0.83 MG/ML
3 SOLUTION RESPIRATORY (INHALATION) AS NEEDED
Status: DISCONTINUED | OUTPATIENT
Start: 2023-12-01 | End: 2023-12-01 | Stop reason: HOSPADM

## 2023-12-01 RX ORDER — HEPARIN 100 UNIT/ML
500 SYRINGE INTRAVENOUS AS NEEDED
Status: DISCONTINUED | OUTPATIENT
Start: 2023-12-01 | End: 2023-12-01 | Stop reason: HOSPADM

## 2023-12-01 RX ORDER — PROCHLORPERAZINE EDISYLATE 5 MG/ML
10 INJECTION INTRAMUSCULAR; INTRAVENOUS EVERY 6 HOURS PRN
Status: DISCONTINUED | OUTPATIENT
Start: 2023-12-01 | End: 2023-12-01 | Stop reason: HOSPADM

## 2023-12-01 RX ADMIN — PALONOSETRON HYDROCHLORIDE 250 MCG: 0.25 INJECTION INTRAVENOUS at 09:20

## 2023-12-01 RX ADMIN — DEXAMETHASONE SODIUM PHOSPHATE 12 MG: 10 INJECTION, SOLUTION INTRAMUSCULAR; INTRAVENOUS at 09:20

## 2023-12-01 RX ADMIN — HEPARIN 500 UNITS: 100 SYRINGE at 10:44

## 2023-12-01 RX ADMIN — FAM-TRASTUZUMAB DERUXTECAN-NXKI 400 MG: 100 INJECTION, POWDER, LYOPHILIZED, FOR SOLUTION INTRAVENOUS at 09:57

## 2023-12-01 ASSESSMENT — PAIN SCALES - GENERAL: PAINLEVEL: 0-NO PAIN

## 2023-12-01 NOTE — PROGRESS NOTES
Chio Turner is a 63 y.o. female who presents for OP Infusion.    She is on the following chemotherapy regimen:     Treatment Plans       Name Type Plan Dates Plan Provider         Active    Fam-trastuzumab deruxtecan, 21 Day Cycles - Breast Oncology Treatment  2/9/2023 - Present Alisha Kahn MD                  .    Since her last visit, she has been doing well.  Overall, she states her energy level is stable.  Her appetite has been unchanged and good.  She reports no complaints.  She has no other concerns.    Lines of note:  implanted port right chest intact.  Site Maintenance: Flushed and Positive blood return.  Line Removal: Yes, avila needle flushed and removed per policy.    Pt tolerated infusion without any complications.  Discharged home in stable condition.

## 2023-12-15 ENCOUNTER — HOSPITAL ENCOUNTER (OUTPATIENT)
Dept: RADIOLOGY | Facility: HOSPITAL | Age: 63
Discharge: HOME | End: 2023-12-15
Payer: MEDICARE

## 2023-12-15 ENCOUNTER — APPOINTMENT (OUTPATIENT)
Dept: RADIOLOGY | Facility: HOSPITAL | Age: 63
End: 2023-12-15
Payer: COMMERCIAL

## 2023-12-15 DIAGNOSIS — C50.919 MALIGNANT NEOPLASM OF FEMALE BREAST, UNSPECIFIED ESTROGEN RECEPTOR STATUS, UNSPECIFIED LATERALITY, UNSPECIFIED SITE OF BREAST (MULTI): ICD-10-CM

## 2023-12-15 PROCEDURE — 74177 CT ABD & PELVIS W/CONTRAST: CPT | Performed by: STUDENT IN AN ORGANIZED HEALTH CARE EDUCATION/TRAINING PROGRAM

## 2023-12-15 PROCEDURE — A9503 TC99M MEDRONATE: HCPCS | Performed by: INTERNAL MEDICINE

## 2023-12-15 PROCEDURE — 78306 BONE IMAGING WHOLE BODY: CPT

## 2023-12-15 PROCEDURE — 78306 BONE IMAGING WHOLE BODY: CPT | Performed by: STUDENT IN AN ORGANIZED HEALTH CARE EDUCATION/TRAINING PROGRAM

## 2023-12-15 PROCEDURE — 71260 CT THORAX DX C+: CPT | Performed by: STUDENT IN AN ORGANIZED HEALTH CARE EDUCATION/TRAINING PROGRAM

## 2023-12-15 PROCEDURE — 2500000004 HC RX 250 GENERAL PHARMACY W/ HCPCS (ALT 636 FOR OP/ED): Performed by: RADIOLOGY

## 2023-12-15 PROCEDURE — 3430000001 HC RX 343 DIAGNOSTIC RADIOPHARMACEUTICALS: Performed by: INTERNAL MEDICINE

## 2023-12-15 PROCEDURE — 74177 CT ABD & PELVIS W/CONTRAST: CPT

## 2023-12-15 PROCEDURE — 2550000001 HC RX 255 CONTRASTS: Performed by: INTERNAL MEDICINE

## 2023-12-15 RX ORDER — HEPARIN 100 UNIT/ML
100 SYRINGE INTRAVENOUS AS NEEDED
Status: DISCONTINUED | OUTPATIENT
Start: 2023-12-15 | End: 2023-12-16 | Stop reason: HOSPADM

## 2023-12-15 RX ADMIN — SODIUM CHLORIDE, PRESERVATIVE FREE 100 UNITS: 5 INJECTION INTRAVENOUS at 11:01

## 2023-12-15 RX ADMIN — IOHEXOL 75 ML: 350 INJECTION, SOLUTION INTRAVENOUS at 10:50

## 2023-12-15 RX ADMIN — TECHNETIUM TC 99M MEDRONATE 25 MILLICURIE: 25 INJECTION, POWDER, FOR SOLUTION INTRAVENOUS at 10:25

## 2023-12-19 ENCOUNTER — OFFICE VISIT (OUTPATIENT)
Dept: HEMATOLOGY/ONCOLOGY | Facility: HOSPITAL | Age: 63
End: 2023-12-19
Payer: MEDICARE

## 2023-12-19 ENCOUNTER — LAB (OUTPATIENT)
Dept: HEMATOLOGY/ONCOLOGY | Facility: HOSPITAL | Age: 63
End: 2023-12-19
Payer: MEDICARE

## 2023-12-19 VITALS
RESPIRATION RATE: 18 BRPM | DIASTOLIC BLOOD PRESSURE: 85 MMHG | BODY MASS INDEX: 32.44 KG/M2 | OXYGEN SATURATION: 98 % | HEART RATE: 109 BPM | HEIGHT: 64 IN | WEIGHT: 190.04 LBS | TEMPERATURE: 98.2 F | SYSTOLIC BLOOD PRESSURE: 141 MMHG

## 2023-12-19 DIAGNOSIS — C50.919 MALIGNANT NEOPLASM OF FEMALE BREAST, UNSPECIFIED ESTROGEN RECEPTOR STATUS, UNSPECIFIED LATERALITY, UNSPECIFIED SITE OF BREAST (MULTI): Primary | ICD-10-CM

## 2023-12-19 DIAGNOSIS — C50.919 MALIGNANT NEOPLASM OF FEMALE BREAST, UNSPECIFIED ESTROGEN RECEPTOR STATUS, UNSPECIFIED LATERALITY, UNSPECIFIED SITE OF BREAST (MULTI): ICD-10-CM

## 2023-12-19 LAB
ALBUMIN SERPL BCP-MCNC: 4.2 G/DL (ref 3.4–5)
ALP SERPL-CCNC: 105 U/L (ref 33–136)
ALT SERPL W P-5'-P-CCNC: 33 U/L (ref 7–45)
ANION GAP SERPL CALC-SCNC: 16 MMOL/L (ref 10–20)
AST SERPL W P-5'-P-CCNC: 51 U/L (ref 9–39)
BASO STIPL BLD QL SMEAR: PRESENT
BASOPHILS # BLD AUTO: 0.09 X10*3/UL (ref 0–0.1)
BASOPHILS NFR BLD AUTO: 1.5 %
BILIRUB SERPL-MCNC: 2 MG/DL (ref 0–1.2)
BUN SERPL-MCNC: 12 MG/DL (ref 6–23)
CALCIUM SERPL-MCNC: 9.8 MG/DL (ref 8.6–10.3)
CHLORIDE SERPL-SCNC: 101 MMOL/L (ref 98–107)
CO2 SERPL-SCNC: 25 MMOL/L (ref 21–32)
CREAT SERPL-MCNC: 0.62 MG/DL (ref 0.5–1.05)
DACRYOCYTES BLD QL SMEAR: NORMAL
EOSINOPHIL # BLD AUTO: 0.36 X10*3/UL (ref 0–0.7)
EOSINOPHIL NFR BLD AUTO: 6 %
ERYTHROCYTE [DISTWIDTH] IN BLOOD BY AUTOMATED COUNT: 25.7 % (ref 11.5–14.5)
GFR SERPL CREATININE-BSD FRML MDRD: >90 ML/MIN/1.73M*2
GLUCOSE SERPL-MCNC: 149 MG/DL (ref 74–99)
HCT VFR BLD AUTO: 30.4 % (ref 36–46)
HGB BLD-MCNC: 10.2 G/DL (ref 12–16)
IMM GRANULOCYTES # BLD AUTO: 0.04 X10*3/UL (ref 0–0.7)
IMM GRANULOCYTES NFR BLD AUTO: 0.7 % (ref 0–0.9)
LYMPHOCYTES # BLD AUTO: 1.1 X10*3/UL (ref 1.2–4.8)
LYMPHOCYTES NFR BLD AUTO: 18.4 %
MCH RBC QN AUTO: 26.2 PG (ref 26–34)
MCHC RBC AUTO-ENTMCNC: 33.6 G/DL (ref 32–36)
MCV RBC AUTO: 78 FL (ref 80–100)
MONOCYTES # BLD AUTO: 0.8 X10*3/UL (ref 0.1–1)
MONOCYTES NFR BLD AUTO: 13.4 %
NEUTROPHILS # BLD AUTO: 3.59 X10*3/UL (ref 1.2–7.7)
NEUTROPHILS NFR BLD AUTO: 60 %
NRBC BLD-RTO: 0.5 /100 WBCS (ref 0–0)
OVALOCYTES BLD QL SMEAR: NORMAL
PLATELET # BLD AUTO: 301 X10*3/UL (ref 150–450)
POLYCHROMASIA BLD QL SMEAR: NORMAL
POTASSIUM SERPL-SCNC: 3.7 MMOL/L (ref 3.5–5.3)
PROT SERPL-MCNC: 7.7 G/DL (ref 6.4–8.2)
RBC # BLD AUTO: 3.9 X10*6/UL (ref 4–5.2)
RBC MORPH BLD: NORMAL
SCHISTOCYTES BLD QL SMEAR: NORMAL
SODIUM SERPL-SCNC: 138 MMOL/L (ref 136–145)
WBC # BLD AUTO: 6 X10*3/UL (ref 4.4–11.3)

## 2023-12-19 PROCEDURE — 96523 IRRIG DRUG DELIVERY DEVICE: CPT

## 2023-12-19 PROCEDURE — 1159F MED LIST DOCD IN RCRD: CPT | Performed by: INTERNAL MEDICINE

## 2023-12-19 PROCEDURE — 3079F DIAST BP 80-89 MM HG: CPT | Performed by: INTERNAL MEDICINE

## 2023-12-19 PROCEDURE — 85025 COMPLETE CBC W/AUTO DIFF WBC: CPT

## 2023-12-19 PROCEDURE — 99215 OFFICE O/P EST HI 40 MIN: CPT | Performed by: INTERNAL MEDICINE

## 2023-12-19 PROCEDURE — 1036F TOBACCO NON-USER: CPT | Performed by: INTERNAL MEDICINE

## 2023-12-19 PROCEDURE — 80053 COMPREHEN METABOLIC PANEL: CPT

## 2023-12-19 PROCEDURE — 3077F SYST BP >= 140 MM HG: CPT | Performed by: INTERNAL MEDICINE

## 2023-12-19 PROCEDURE — 2500000004 HC RX 250 GENERAL PHARMACY W/ HCPCS (ALT 636 FOR OP/ED): Performed by: PHARMACIST

## 2023-12-19 PROCEDURE — 1126F AMNT PAIN NOTED NONE PRSNT: CPT | Performed by: INTERNAL MEDICINE

## 2023-12-19 PROCEDURE — 36591 DRAW BLOOD OFF VENOUS DEVICE: CPT

## 2023-12-19 RX ORDER — HEPARIN SODIUM,PORCINE/PF 10 UNIT/ML
50 SYRINGE (ML) INTRAVENOUS AS NEEDED
Status: CANCELLED | OUTPATIENT
Start: 2023-12-19

## 2023-12-19 RX ORDER — PROCHLORPERAZINE EDISYLATE 5 MG/ML
10 INJECTION INTRAMUSCULAR; INTRAVENOUS EVERY 6 HOURS PRN
Status: CANCELLED | OUTPATIENT
Start: 2023-12-22

## 2023-12-19 RX ORDER — FAMOTIDINE 10 MG/ML
20 INJECTION INTRAVENOUS ONCE AS NEEDED
Status: CANCELLED | OUTPATIENT
Start: 2023-12-22

## 2023-12-19 RX ORDER — PROCHLORPERAZINE MALEATE 10 MG
10 TABLET ORAL EVERY 6 HOURS PRN
Status: CANCELLED | OUTPATIENT
Start: 2023-12-22

## 2023-12-19 RX ORDER — HEPARIN 100 UNIT/ML
500 SYRINGE INTRAVENOUS AS NEEDED
Status: DISCONTINUED | OUTPATIENT
Start: 2023-12-19 | End: 2023-12-19 | Stop reason: HOSPADM

## 2023-12-19 RX ORDER — LEVOTHYROXINE SODIUM 100 UG/1
100 TABLET ORAL
COMMUNITY

## 2023-12-19 RX ORDER — ALBUTEROL SULFATE 0.83 MG/ML
3 SOLUTION RESPIRATORY (INHALATION) AS NEEDED
Status: CANCELLED | OUTPATIENT
Start: 2023-12-22

## 2023-12-19 RX ORDER — HEPARIN 100 UNIT/ML
500 SYRINGE INTRAVENOUS AS NEEDED
Status: CANCELLED | OUTPATIENT
Start: 2023-12-19

## 2023-12-19 RX ORDER — PALONOSETRON 0.05 MG/ML
0.25 INJECTION, SOLUTION INTRAVENOUS ONCE
Status: CANCELLED | OUTPATIENT
Start: 2023-12-22

## 2023-12-19 RX ORDER — DIPHENHYDRAMINE HYDROCHLORIDE 50 MG/ML
50 INJECTION INTRAMUSCULAR; INTRAVENOUS AS NEEDED
Status: CANCELLED | OUTPATIENT
Start: 2023-12-22

## 2023-12-19 RX ORDER — EPINEPHRINE 0.3 MG/.3ML
0.3 INJECTION SUBCUTANEOUS EVERY 5 MIN PRN
Status: CANCELLED | OUTPATIENT
Start: 2023-12-22

## 2023-12-19 RX ADMIN — HEPARIN 500 UNITS: 100 SYRINGE at 11:08

## 2023-12-19 ASSESSMENT — PAIN SCALES - GENERAL: PAINLEVEL: 0-NO PAIN

## 2023-12-19 NOTE — PROGRESS NOTES
Patient Visit Information:   Visit Type: Follow Up Visit      Cancer History:          Breast         AJCC Edition: 7th (AJCC), Diagnosis Date: 05-Jun-2015, IV, T2 N1 M1      Treatment Synopsis:    63-year-old postmenopausal  female with stage IV multifocal right-sided invasive ductal carcinoma with metastatic disease to the lungs bilaterally and now possibly to the liver.       The patient's breast cancer was diagnosed on June 5, 2015, and is estrogen receptor positive at 90%, progesterone receptor positive at 70%, and HER-2/jordyn negative. Staging CT showed bilateral pulmonary nodules. Nodules were biospy-proven (07/01/2015)  to be consistent with metastatic mammary carcinoma with neuroendocrine features, ER 99%, ME 85%, and her2 negative (1+). .     CURRENT THERAPY: Fam Trastuzumab Deruxtecan              Treatment History:    2015-7-1: Cancer Staging: Lung biopsy: Metastatic mammary carcinoma with neuroendocrine features, ER 99%, ME 85%, and her2 negative.  2015-7-8: New Treatment Plan: Initiated on Letrozole 2.5mg by mouth daily plus pablociclib 125 mg by mouth daily 3 weeks on, one week off.  2018-8-10: New Treatment Plan: Letrozole discontinued. Patient started on Faslodex plus Ibrance due to slight disease progression in right breast  2019-1-16: Cancer Related Surgery: S/p bilateral mastectomy with right ALND. Pathology revealed 3.0cm of invasive ductal carcinoma, with 1/2 SLNs positive and 1/5 ALNs positive.  2019-4-19: New Treatment Plan: Faslodex discontinued. Letrozole restarted. Will continue on Ibrance  2020-9-11: New Treatment Plan: Faslodex resumed due to progression in lung. Letrozole discontinued. Will remain on Ibrance  2022-7-2: New Treatment Plan: Started on Afinitor and Exemestane. Ibrance and Faslodex discontinued due to disease progression  2022-9-16: Disease Assessment: Interval increase in thoracic tumor burden  2022-10-7: New Treatment Plan: Started on Capecitabine 2000mg by  mouth BID  2023-2-10: New treatment started with FAM-Trastuzumab Deruxtecan due to disease progression  2023-15-12: Patient underwent restaging scans. This showed disease progression in lung and liver.         History of Present Illness:      ID Statement:    TJ CROCKETT is a 63 year old Female        Chief Complaint: Here for a follow-up visit and to  be evaluated prior to cycle #16 and also to review scans       Interval History:       INTERVAL HISTORY     Ms. Yue Barroso comes in for a follow-up visit.  She denies fever chills or rigors, n/v/d.     Last set of restaging scans were completed on  12/15/2023. This showed disease progression in lung and liver.      Her last echo was completed on 08/10/2023 which showed LVEF 60-65%        Allergies and Intolerances:       Allergies:         penicillin: Drug, Hives/Urticaria, Active     Outpatient Medication Profile:  *    Current Outpatient Medications:     amLODIPine (Norvasc) 5 mg tablet, Take 1 tablet (5 mg) by mouth once daily., Disp: , Rfl:     calcium carbonate-vitamin D3 1,000 mg-20 mcg (800 unit) tablet, Take by mouth 2 times a day., Disp: , Rfl:     chlorthalidone (Hygroton) 25 mg tablet, Take 1 tablet (25 mg) by mouth once daily., Disp: , Rfl:     cholecalciferol (Vitamin D-3) 25 MCG (1000 UT) capsule, Take 1 capsule (25 mcg) by mouth once daily., Disp: , Rfl:     hydroCHLOROthiazide (HYDRODiuril) 25 mg tablet, Take 1 tablet (25 mg) by mouth once daily., Disp: , Rfl:     levothyroxine (Synthroid, Levoxyl) 100 mcg tablet, Take 1 tablet (100 mcg) by mouth once daily in the morning. Take before meals., Disp: , Rfl:     magnesium oxide (Mag-Ox) 400 mg (241.3 mg magnesium) tablet, Take 1 tablet (400 mg) by mouth once daily., Disp: , Rfl:     multivit-min-iron-FA-vit K-lut (Centrum Silver Women) 8 mg iron-400 mcg-50 mcg tablet, Take 1 tablet by mouth once daily., Disp: , Rfl:     NON FORMULARY, Take 1 each by mouth 2 times a day.  Turmeric Curcumin Oral  "Capsule, Disp: , Rfl:     ondansetron (Zofran) 8 mg tablet, Take 1 tablet (8 mg) by mouth every 8 hours if needed., Disp: , Rfl:     PaxiL 10 mg tablet, Take 1 tablet (10 mg) by mouth once daily., Disp: , Rfl:     levothyroxine (Synthroid, Levoxyl) 88 mcg tablet, , Disp: , Rfl:     multivit-minerals/folic acid (CENTRUM ADULTS ORAL), Take 1 tablet by mouth once daily., Disp: , Rfl:     potassium chloride (Klor-Con) 20 mEq packet, Take 20 mEq by mouth once daily., Disp: , Rfl:     potassium chloride CR 20 mEq ER tablet, Take 2 tablets (40 mEq) by mouth twice a day., Disp: , Rfl:   No current facility-administered medications for this visit.      Medical History:         High risk medication use: ICD-10: Z79.899, Status: Active         Breast cancer: ICD-10: C50.919, Status: Active         Recurrent Clostridium difficile diarrhea: ICD-10: A04.7, Status:  Active         Hypokalemia: ICD-10: E87.6, Status: Active         Malignant neoplasm of breast: ICD-10: C50.919, Status: Active         Social History:   Social Substance History:  ·  Smoking Status never smoker (1)   ·  Additional History     Breast Cancer Risk Factors:  , Had her menarche at age 10 years,  menopause 52 , used BCP x 2 years but never used HRT     Family History   Mother had ovarian cancer - age 65- BRCA negative s/p b/l salpingo oopherectomy.  Father  had colon cancer- age 56,  at 59 of metastatic disease.     Social History    Never smoker , social alcohol use  Active , exercises regularly, administrative job.                 Performance:   ECOG Performance Status: 0 Fully Active         Vitals and Measurements:   Visit Vitals  /85   Pulse 109   Temp 36.8 °C (98.2 °F) (Temporal)   Resp 18   Ht 1.638 m (5' 4.49\")   Wt 86.2 kg (190 lb 0.6 oz)   SpO2 98%   BMI 32.13 kg/m²   Smoking Status Never   BSA 1.98 m²      Physical Exam:      Constitutional: AAOx3. appears comfortable.   Eyes: B/l GAEL. EOMI   ENMT: No oral ulcers or thrush. No " pharyngeal congestion.   Head/Neck: No thyromegaly/JVD.   Respiratory/Thorax: B/l Air entry equal. No added  sounds.   Cardiovascular: S1s2+. No murmur/rub/Gallop   Gastrointestinal: Soft, Non tender. No hepatosplenomegaly.  BS+.   Extremities: No pedal edema.   Neurological: No focal motor deficits.   Breast: B/l s/p mastectomy, Lumpiness felt-stable  since last clinic visit.  No lymphedema   Lymphatic: No significant lymphadenopathy   Psychological: Appropriate mood and behavior   Skin: No rash/petechiae         Lab Results:     Lab Results   Component Value Date    WBC 6.0 12/19/2023    HGB 10.2 (L) 12/19/2023    HCT 30.4 (L) 12/19/2023    MCV 78 (L) 12/19/2023     12/19/2023        Lab Results   Component Value Date    NEUTROABS 3.59 12/19/2023          Chemistry    Lab Results   Component Value Date/Time     12/19/2023 1108    K 3.7 12/19/2023 1108     12/19/2023 1108    CO2 25 12/19/2023 1108    BUN 12 12/19/2023 1108    CREATININE 0.62 12/19/2023 1108    Lab Results   Component Value Date/Time    CALCIUM 9.8 12/19/2023 1108    ALKPHOS 105 12/19/2023 1108    AST 51 (H) 12/19/2023 1108    ALT 33 12/19/2023 1108    BILITOT 2.0 (H) 12/19/2023 1108            Radiology Result:     CT chest abdomen pelvis w IV contrast  Status: Final result     PACS Images     Show images for CT chest abdomen pelvis w IV contrast  In Basket Actions    Done  Result Mgmt     View in In Basket  Signed by    Signed Time Phone Pager   Franklin Torres MD 12/16/2023 11:49 [phone]      Exam Information    Status Exam Begun Exam Ended   Final 12/15/2023 09:49 12/15/2023 11:12     Study Result    Narrative & Impression   Interpreted By:  Franklin Torres,   STUDY:  CT CHEST ABDOMEN PELVIS W IV CONTRAST;  12/15/2023 11:12 am      INDICATION:  Signs/Symptoms:restaging.      COMPARISON:  Multiple CTs of the chest, abdomen and pelvis most recently 09/01/2023      ACCESSION NUMBER(S):  [id number]      ORDERING  CLINICIAN:  OTTONIEL POWERS      TECHNIQUE:  CT of the chest, abdomen, and pelvis was performed.  Contiguous axial  images were obtained at 3 mm slice thickness through the chest,  abdomen and pelvis. Coronal and sagittal reconstructions at 3 mm  slice thickness were performed.  75 ml of contrast Omnipaque 350 were administered intravenously  without immediate complication.      FINDINGS:  CHEST:      LUNG/PLEURA/LARGE AIRWAYS:  Central airways are patent without endobronchial lesions.  Redemonstration of several pulmonary nodules/masses, stable to  increased in size since 09/01/2023. for example a right lower lobe  4.6 x 3.9 cm mass (series 204, image 184), previously 3.8 x 3.3 cm;  0.8 x 0.8 cm unchanged right lower lobe nodule (image 174); 17 x 16  mm lingular nodule (image 160), unchanged; a 12 x 9 mm left apical  nodule (image 39), unchanged. Multiple other pulmonary nodules are  noted. There is a stable 4.3 x 2.6 cm airspace opacity in the right  perihilar/infrahilar region with dense calcification, which likely  represent sequela of treatment changes and is unchanged several  examinations. Mild bibasilar atelectasis. No pneumothorax. No pleural  effusion. Patchy opacities in the lingula and anterior right middle  lobe, compatible with atelectasis.      VESSELS:  Aorta and pulmonary arteries are normal caliber.  Mild  atherosclerotic changes are noted of the aorta and branching vessels.  No coronary artery calcifications are present.      HEART:  Normal size. No pericardial effusion      MEDIASTINUM AND NAM:  No mediastinal, hilar or axillary lymphadenopathy is present.  Multiple calcified hilar and right mediastinal nodes, likely prior  treatment changes. The esophagus is nondilated and appears normal in  entire length.      CHEST WALL AND LOWER NECK:  Bilateral mastectomies and stable bilateral breast fat necrosis.  Multiple surgical clips bilateral axilla. Stable right port catheter.  No suspicious osseous  lesions. Mild degenerative changes of the  thoracic spine. Thyroid gland is unremarkable.      ABDOMEN:      LIVER:  Diffuse hepatic steatosis. Compared with the CT from 09/01/2023,  there has been interval development of multiple, some subtle,  hypoattenuating/hypoenhancing lesions. For example a 17 mm segment IV  B lesion (series 201 image 84); 6 mm segment V lesion (image 101); 5  mm segment VI lesion (image 82); 10 mm posterior segment VII lesion  (image 80) and an 11 mm lateral segment segment VII lesion (image  67), all new since prior. The previously noted lesion seen in segment  VII is not visualized on today's examination.      BILE DUCTS:  The intrahepatic and extrahepatic ducts are not dilated.      GALLBLADDER:  The gallbladder is nondistended and without evidence of radiopaque  stones.      PANCREAS:  The pancreas appears unremarkable without evidence of ductal  dilatation or masses. The previously noted enhancing lesion is less  prominent on today's examination.      SPLEEN:  The spleen is normal in size and demonstrates multiple calcific foci.      ADRENAL GLANDS:  3.2 cm right fat containing adrenal lesion, likely a lipid rich  adenoma or myelolipoma. Stable mild left adrenal thickening.      KIDNEYS AND URETERS:  The kidneys are normal in size and enhance symmetrically.  No  hydroureteronephrosis or nephroureterolithiasis is identified.      PELVIS:      BLADDER:  The urinary bladder appears normal without abnormal wall thickening.      REPRODUCTIVE ORGANS:  Bulky and enlarged diffusely calcified uterus, likely secondary to  large fibroids.      BOWEL:  The stomach is unremarkable. Small duodenal diverticulum. No bowel  dilation or wall thickening. Colonic diverticulosis without  diverticulitis. Moderate colonic stool. Appendix is normal.          VESSELS:  There is no aneurysmal dilatation of the abdominal aorta. The IVC  appears normal. Mild atherosclerotic calcification of the abdominal  aorta  and iliac arteries      PERITONEUM/RETROPERITONEUM/LYMPH NODES:  There is no free or loculated fluid collection, no free  intraperitoneal air. The retroperitoneum appears normal.  No  abdominopelvic lymphadenopathy is present.      BONE AND SOFT TISSUE:  No suspicious osseous lesions are identified. Degenerative discogenic  disease is noted in the lower thoracic and lumbar spine.  Multiple  bilateral retro gluteal injection. Small fat containing umbilical  hernia.      IMPRESSION:  CHEST:  1.  Redemonstration of several bilateral pulmonary nodules/masses  stable to increased in size since 09/01/2023, concerning for  progressive metastatic disease.  2. No suspicious thoracic adenopathy.  3. Other stable chronic findings as described above.      ABDOMEN-PELVIS:  1.  Interval development of several hypoattenuating, some subtle,  hepatic lesions as detailed above, concerning for metastatic disease.  Recommend further evaluation with MRI of the liver with and without  contrast, preferably with Eovist contrast.  2.  Otherwise, no new evidence of metastatic disease in the abdomen  and pelvis.  3. Other stable chronic findings as described above.           Assessment and Plan:   Assessment:    62 year old post menopausal woman with stage IV breast cancer, invasive ductal carcinoma, ER/HI positive, HER 2 negative. s/p bilateral mastectomies in 01/2019.  Started on Ibrance and faslodex. Patient switched back to Letrozole due to the fact that progression was in breast only and she has now undergone a mastectomy.  Has since developed progression.      She is currently on Enhertu. Friday will be C16 D1.      Scans on 12/15/2023 showed disease progression in lung and liver.  I discussed these results with patient and  and after much deliberation decided to obtain a liver biopsy to guide further treatment decision making.      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

## 2023-12-20 DIAGNOSIS — Z91.89 AT RISK FOR HEMORRHAGE ASSOCIATED WITH LIVER BIOPSY: Primary | ICD-10-CM

## 2023-12-22 ENCOUNTER — INFUSION (OUTPATIENT)
Dept: HEMATOLOGY/ONCOLOGY | Facility: HOSPITAL | Age: 63
End: 2023-12-22
Payer: MEDICARE

## 2023-12-22 VITALS
HEART RATE: 63 BPM | RESPIRATION RATE: 18 BRPM | TEMPERATURE: 97.7 F | BODY MASS INDEX: 32.54 KG/M2 | SYSTOLIC BLOOD PRESSURE: 133 MMHG | WEIGHT: 192.5 LBS | OXYGEN SATURATION: 98 % | DIASTOLIC BLOOD PRESSURE: 78 MMHG

## 2023-12-22 DIAGNOSIS — C50.919 MALIGNANT NEOPLASM OF FEMALE BREAST, UNSPECIFIED ESTROGEN RECEPTOR STATUS, UNSPECIFIED LATERALITY, UNSPECIFIED SITE OF BREAST (MULTI): ICD-10-CM

## 2023-12-22 DIAGNOSIS — Z91.89 AT RISK FOR HEMORRHAGE ASSOCIATED WITH LIVER BIOPSY: ICD-10-CM

## 2023-12-22 LAB
INR PPP: 1 (ref 0.9–1.1)
PROTHROMBIN TIME: 10.7 SECONDS (ref 9.8–12.8)

## 2023-12-22 PROCEDURE — 2500000004 HC RX 250 GENERAL PHARMACY W/ HCPCS (ALT 636 FOR OP/ED): Performed by: PHARMACIST

## 2023-12-22 PROCEDURE — 96413 CHEMO IV INFUSION 1 HR: CPT

## 2023-12-22 PROCEDURE — 85610 PROTHROMBIN TIME: CPT | Performed by: NURSE PRACTITIONER

## 2023-12-22 PROCEDURE — 2500000004 HC RX 250 GENERAL PHARMACY W/ HCPCS (ALT 636 FOR OP/ED): Performed by: INTERNAL MEDICINE

## 2023-12-22 PROCEDURE — 2500000004 HC RX 250 GENERAL PHARMACY W/ HCPCS (ALT 636 FOR OP/ED): Mod: JZ | Performed by: INTERNAL MEDICINE

## 2023-12-22 PROCEDURE — 96375 TX/PRO/DX INJ NEW DRUG ADDON: CPT | Mod: INF

## 2023-12-22 RX ORDER — PROCHLORPERAZINE MALEATE 10 MG
10 TABLET ORAL EVERY 6 HOURS PRN
Status: DISCONTINUED | OUTPATIENT
Start: 2023-12-22 | End: 2023-12-22 | Stop reason: HOSPADM

## 2023-12-22 RX ORDER — FAMOTIDINE 10 MG/ML
20 INJECTION INTRAVENOUS ONCE AS NEEDED
Status: DISCONTINUED | OUTPATIENT
Start: 2023-12-22 | End: 2023-12-22 | Stop reason: HOSPADM

## 2023-12-22 RX ORDER — HEPARIN SODIUM,PORCINE/PF 10 UNIT/ML
50 SYRINGE (ML) INTRAVENOUS AS NEEDED
Status: CANCELLED | OUTPATIENT
Start: 2023-12-22

## 2023-12-22 RX ORDER — DIPHENHYDRAMINE HYDROCHLORIDE 50 MG/ML
50 INJECTION INTRAMUSCULAR; INTRAVENOUS AS NEEDED
Status: DISCONTINUED | OUTPATIENT
Start: 2023-12-22 | End: 2023-12-22 | Stop reason: HOSPADM

## 2023-12-22 RX ORDER — ALBUTEROL SULFATE 0.83 MG/ML
3 SOLUTION RESPIRATORY (INHALATION) AS NEEDED
Status: DISCONTINUED | OUTPATIENT
Start: 2023-12-22 | End: 2023-12-22 | Stop reason: HOSPADM

## 2023-12-22 RX ORDER — PROCHLORPERAZINE EDISYLATE 5 MG/ML
10 INJECTION INTRAMUSCULAR; INTRAVENOUS EVERY 6 HOURS PRN
Status: DISCONTINUED | OUTPATIENT
Start: 2023-12-22 | End: 2023-12-22 | Stop reason: HOSPADM

## 2023-12-22 RX ORDER — HEPARIN 100 UNIT/ML
500 SYRINGE INTRAVENOUS AS NEEDED
Status: CANCELLED | OUTPATIENT
Start: 2023-12-22

## 2023-12-22 RX ORDER — EPINEPHRINE 0.3 MG/.3ML
0.3 INJECTION SUBCUTANEOUS EVERY 5 MIN PRN
Status: DISCONTINUED | OUTPATIENT
Start: 2023-12-22 | End: 2023-12-22 | Stop reason: HOSPADM

## 2023-12-22 RX ORDER — HEPARIN 100 UNIT/ML
500 SYRINGE INTRAVENOUS AS NEEDED
Status: DISCONTINUED | OUTPATIENT
Start: 2023-12-22 | End: 2023-12-22 | Stop reason: HOSPADM

## 2023-12-22 RX ORDER — PALONOSETRON 0.05 MG/ML
0.25 INJECTION, SOLUTION INTRAVENOUS ONCE
Status: COMPLETED | OUTPATIENT
Start: 2023-12-22 | End: 2023-12-22

## 2023-12-22 RX ADMIN — PALONOSETRON HYDROCHLORIDE 250 MCG: 0.25 INJECTION INTRAVENOUS at 09:14

## 2023-12-22 RX ADMIN — HEPARIN 500 UNITS: 100 SYRINGE at 10:45

## 2023-12-22 RX ADMIN — FAM-TRASTUZUMAB DERUXTECAN-NXKI 400 MG: 100 INJECTION, POWDER, LYOPHILIZED, FOR SOLUTION INTRAVENOUS at 10:01

## 2023-12-22 RX ADMIN — DEXAMETHASONE SODIUM PHOSPHATE 12 MG: 10 INJECTION, SOLUTION INTRAMUSCULAR; INTRAVENOUS at 09:14

## 2023-12-22 ASSESSMENT — PAIN SCALES - GENERAL: PAINLEVEL: 0-NO PAIN

## 2023-12-22 NOTE — PROGRESS NOTES
Chio Turner is a 63 y.o. female who presents for Chemotherapy.    She is on the following chemotherapy regimen:     Treatment Plans       Name Type Plan Dates Plan Provider         Active    Fam-trastuzumab deruxtecan, 21 Day Cycles - Breast Oncology Treatment  2/9/2023 - Present Alisha Kahn MD                  .    Since her last visit, she has been doing well.  Overall, she states her energy level is stable.  Her appetite has been unchanged.  She reports no complaints.     Lines of note:  implanted port right chest intact.  Site Maintenance: Flushed and Positive blood return.  Line Removal: Yes, avila needle flushed and removed per policy.    Pt tolerated infusion without any complications.  Discharged home with family in stable condition.

## 2023-12-26 ENCOUNTER — HOSPITAL ENCOUNTER (OUTPATIENT)
Dept: RADIOLOGY | Facility: HOSPITAL | Age: 63
Discharge: HOME | End: 2023-12-26
Payer: MEDICARE

## 2023-12-26 VITALS
HEIGHT: 64 IN | RESPIRATION RATE: 18 BRPM | HEART RATE: 70 BPM | BODY MASS INDEX: 32.44 KG/M2 | DIASTOLIC BLOOD PRESSURE: 70 MMHG | TEMPERATURE: 96.8 F | SYSTOLIC BLOOD PRESSURE: 100 MMHG | WEIGHT: 190 LBS | OXYGEN SATURATION: 94 %

## 2023-12-26 DIAGNOSIS — C50.919 MALIGNANT NEOPLASM OF FEMALE BREAST, UNSPECIFIED ESTROGEN RECEPTOR STATUS, UNSPECIFIED LATERALITY, UNSPECIFIED SITE OF BREAST (MULTI): ICD-10-CM

## 2023-12-26 PROCEDURE — 47000 NEEDLE BIOPSY OF LIVER PERQ: CPT | Performed by: RADIOLOGY

## 2023-12-26 PROCEDURE — 76942 ECHO GUIDE FOR BIOPSY: CPT | Mod: MUE

## 2023-12-26 PROCEDURE — 2500000004 HC RX 250 GENERAL PHARMACY W/ HCPCS (ALT 636 FOR OP/ED): Performed by: RADIOLOGY

## 2023-12-26 PROCEDURE — 88342 IMHCHEM/IMCYTCHM 1ST ANTB: CPT | Performed by: PATHOLOGY

## 2023-12-26 PROCEDURE — 88307 TISSUE EXAM BY PATHOLOGIST: CPT | Performed by: PATHOLOGY

## 2023-12-26 PROCEDURE — 36593 DECLOT VASCULAR DEVICE: CPT

## 2023-12-26 PROCEDURE — 99152 MOD SED SAME PHYS/QHP 5/>YRS: CPT | Performed by: RADIOLOGY

## 2023-12-26 PROCEDURE — 88307 TISSUE EXAM BY PATHOLOGIST: CPT | Mod: TC,SUR | Performed by: STUDENT IN AN ORGANIZED HEALTH CARE EDUCATION/TRAINING PROGRAM

## 2023-12-26 PROCEDURE — 88360 TUMOR IMMUNOHISTOCHEM/MANUAL: CPT | Performed by: PATHOLOGY

## 2023-12-26 PROCEDURE — 2720000007 HC OR 272 NO HCPCS

## 2023-12-26 PROCEDURE — 76942 ECHO GUIDE FOR BIOPSY: CPT | Performed by: RADIOLOGY

## 2023-12-26 PROCEDURE — 76942 ECHO GUIDE FOR BIOPSY: CPT

## 2023-12-26 PROCEDURE — 88341 IMHCHEM/IMCYTCHM EA ADD ANTB: CPT | Performed by: PATHOLOGY

## 2023-12-26 RX ORDER — MIDAZOLAM HYDROCHLORIDE 1 MG/ML
INJECTION INTRAMUSCULAR; INTRAVENOUS
Status: COMPLETED | OUTPATIENT
Start: 2023-12-26 | End: 2023-12-26

## 2023-12-26 RX ORDER — FENTANYL CITRATE 50 UG/ML
INJECTION, SOLUTION INTRAMUSCULAR; INTRAVENOUS
Status: COMPLETED | OUTPATIENT
Start: 2023-12-26 | End: 2023-12-26

## 2023-12-26 RX ADMIN — MIDAZOLAM HYDROCHLORIDE 1 MG: 1 INJECTION, SOLUTION INTRAMUSCULAR; INTRAVENOUS at 11:03

## 2023-12-26 RX ADMIN — FENTANYL CITRATE 50 MCG: 50 INJECTION, SOLUTION INTRAMUSCULAR; INTRAVENOUS at 11:02

## 2023-12-26 RX ADMIN — MIDAZOLAM HYDROCHLORIDE 1 MG: 1 INJECTION, SOLUTION INTRAMUSCULAR; INTRAVENOUS at 10:58

## 2023-12-26 RX ADMIN — FENTANYL CITRATE 50 MCG: 50 INJECTION, SOLUTION INTRAMUSCULAR; INTRAVENOUS at 10:58

## 2023-12-26 ASSESSMENT — PAIN SCALES - GENERAL
PAINLEVEL_OUTOF10: 3
PAINLEVEL_OUTOF10: 0 - NO PAIN

## 2023-12-26 ASSESSMENT — PAIN - FUNCTIONAL ASSESSMENT
PAIN_FUNCTIONAL_ASSESSMENT: 0-10

## 2023-12-26 ASSESSMENT — PAIN DESCRIPTION - DESCRIPTORS: DESCRIPTORS: ACHING

## 2023-12-26 NOTE — DISCHARGE INSTRUCTIONS
Discharge information    See Liver Bx PI sheet    Ok to remove dressing on 12/27/23 at 1100.  Ok to shower on 12/27/23, no baths, jacuzzis, or swimming. Do not get submerged in a body of water (leads to increased risk of infection.)  Ok to keep open until healed. Healing is when a scab is created and falls off, usually within 5-10 days.     Look for signs and symptoms of infection:  Including: redness, swelling, discharge such as pus, or odor from site.    Look for bleeding from site:  If bleeding occurs hold pressure for 5 minutes with paper towel, check site if still bleeding hold for 5 more minutes  If site continues to bleed after 10 minutes call 911.    You received moderate sedation:  - Do not drive a car, or operate any machinery or power tools of any kind.  - Do not drink any alcoholic drinks.  - Do not take any over the counter medications that may cause drowsiness.  - Do not make any important decisions or sign any legal documents.  - You need to have a responsible adult accompany you home.  - You may resume your normal diet.  - We strongly suggest that a responsible adult be with you for the rest of the day and also during the night. This is for your protection and safety.   -Do not lift anything more than 10lbs for 1 week.     For questions related to your procedure:  Please call [phone] between the hours of 7:00am-5:00pm Monday through Friday.  Please call [phone] after 5:00pm weeknights and on weekends and holidays.     In the event of an emergency call 911 or go to your nearest emergency room.

## 2023-12-26 NOTE — PRE-PROCEDURE NOTE
Interventional Radiology Preprocedure Note    Indication for procedure: The encounter diagnosis was Malignant neoplasm of female breast, unspecified estrogen receptor status, unspecified laterality, unspecified site of breast (CMS/HCC).    Relevant review of systems: NA    Relevant Labs:   Lab Results   Component Value Date    CREATININE 0.62 12/19/2023    EGFR >90 12/19/2023    INR 1.0 12/22/2023    PROTIME 10.7 12/22/2023       Planned Sedation/Anesthesia: Moderate    Airway assessment: normal    Directed physical examination:    Alert & oriented, calm     Mallampati: III (soft and hard palate and base of uvula visible)    ASA Score: ASA 2 - Patient with mild systemic disease with no functional limitations    Benefits, risks and alternatives of procedure and planned sedation have been discussed with the patient and/or their representative. All questions answered and they agree to proceed.

## 2023-12-26 NOTE — PRE-PROCEDURE NOTE
Interventional Radiology Preprocedure Note    Indication for procedure: The encounter diagnosis was Malignant neoplasm of female breast, unspecified estrogen receptor status, unspecified laterality, unspecified site of breast (CMS/HCC).    Relevant review of systems:  No headache, chest pain, abdominal pain,  fever chills, nausea or vomiting.     Relevant Labs:   Lab Results   Component Value Date    CREATININE 0.62 12/19/2023    EGFR >90 12/19/2023    INR 1.0 12/22/2023    PROTIME 10.7 12/22/2023       Planned Sedation/Anesthesia: Moderate    Airway assessment: normal    Directed physical examination:    RRR. Normal breath sounds. No abdominal tenderness.     Mallampati: II (hard and soft palate, upper portion of tonsils anduvula visible)    ASA Score: ASA 2 - Patient with mild systemic disease with no functional limitations    Benefits, risks and alternatives of procedure and planned sedation have been discussed with the patient and/or their representative. All questions answered and they agree to proceed.

## 2023-12-26 NOTE — POST-PROCEDURE NOTE
Interventional Radiology Brief Postprocedure Note    Attending: Dr. Rose    Assistant: Dr. Stubbs    Diagnosis: breast cancer with liver mass    Description of procedure: A total of 3 passes were made into the right hepatic lobe lesion under ultrasound guidance using a 18 Gauge needle passed through a 17 gauge coaxial system. Scanning after each pass demonstrated no bleeding.  Gel foam torpedos were deployed for hemostasis. Specimens were sent to pathology for further analysis. See PACS for full procedural report.       Anesthesia:  Local, moderate    Complications: None    Estimated Blood Loss: minimal    Medications (Filter: Administrations occurring from 1052 to 1105 on 12/26/23) As of 12/26/23 1105      fentaNYL PF (Sublimaze) injection (mcg) Total dose:  100 mcg      Date/Time Rate/Dose/Volume Action       12/26/23  1058 50 mcg Given      1102 50 mcg Given               midazolam (Versed) injection (mg) Total dose:  2 mg      Date/Time Rate/Dose/Volume Action       12/26/23  1058 1 mg Given      1103 1 mg Given                       See detailed result report with images in PACS.    The patient tolerated the procedure well without incident or complication and is in stable condition.

## 2024-01-09 ENCOUNTER — OFFICE VISIT (OUTPATIENT)
Dept: HEMATOLOGY/ONCOLOGY | Facility: HOSPITAL | Age: 64
End: 2024-01-09
Payer: MEDICARE

## 2024-01-09 VITALS
HEART RATE: 100 BPM | SYSTOLIC BLOOD PRESSURE: 155 MMHG | BODY MASS INDEX: 32.74 KG/M2 | RESPIRATION RATE: 18 BRPM | DIASTOLIC BLOOD PRESSURE: 89 MMHG | TEMPERATURE: 98.1 F | WEIGHT: 191.8 LBS | HEIGHT: 64 IN | OXYGEN SATURATION: 96 %

## 2024-01-09 DIAGNOSIS — C50.919 MALIGNANT NEOPLASM OF FEMALE BREAST, UNSPECIFIED ESTROGEN RECEPTOR STATUS, UNSPECIFIED LATERALITY, UNSPECIFIED SITE OF BREAST (MULTI): ICD-10-CM

## 2024-01-09 PROCEDURE — 1126F AMNT PAIN NOTED NONE PRSNT: CPT | Performed by: INTERNAL MEDICINE

## 2024-01-09 PROCEDURE — 1159F MED LIST DOCD IN RCRD: CPT | Performed by: INTERNAL MEDICINE

## 2024-01-09 PROCEDURE — 3079F DIAST BP 80-89 MM HG: CPT | Performed by: INTERNAL MEDICINE

## 2024-01-09 PROCEDURE — 1036F TOBACCO NON-USER: CPT | Performed by: INTERNAL MEDICINE

## 2024-01-09 PROCEDURE — 99215 OFFICE O/P EST HI 40 MIN: CPT | Performed by: INTERNAL MEDICINE

## 2024-01-09 PROCEDURE — 3077F SYST BP >= 140 MM HG: CPT | Performed by: INTERNAL MEDICINE

## 2024-01-09 ASSESSMENT — PAIN SCALES - GENERAL: PAINLEVEL: 0-NO PAIN

## 2024-01-09 NOTE — PROGRESS NOTES
Patient Visit Information:   Visit Type: Follow Up Visit      Cancer History:          Breast         AJCC Edition: 7th (AJCC), Diagnosis Date: 05-Jun-2015, IV, T2 N1 M1      Treatment Synopsis:    63-year-old postmenopausal  female with stage IV multifocal right-sided invasive ductal carcinoma with metastatic disease to the lungs bilaterally and to the liver.       The patient's breast cancer was diagnosed on June 5, 2015, and is estrogen receptor positive at 90%, progesterone receptor positive at 70%, and HER-2/jordyn negative with neuroendocrine features. Staging CT at the time of initial diagnosis showed bilateral pulmonary nodules. Nodules were biospy-proven (07/01/2015)  to be consistent with metastatic mammary carcinoma with neuroendocrine features, ER 99%, KS 85%, and her2 negative (1+). Developed progression in liver. Liver biopsy  completed 12/26/2023 revealed metastatic mammary carcinoma with neuroendocrine features, ER+ 95%, KS positive 85% and Her2 negative 1+.    [facility] TESTING:     This revealed [id number] mutation; CCND1 amplification, [id number]; and [id number]     CURRENT THERAPY: Fam Trastuzumab Deruxtecan        Treatment History:    2015-7-1: Cancer Staging: Lung biopsy: Metastatic mammary carcinoma with neuroendocrine features, ER 99%, KS 85%, and her2 negative.  2015-7-8: New Treatment Plan: Initiated on Letrozole 2.5mg by mouth daily plus pablociclib 125 mg by mouth daily 3 weeks on, one week off.  2018-8-10: New Treatment Plan: Letrozole discontinued. Patient started on Faslodex plus Ibrance due to slight disease progression in right breast  2019-1-16: Cancer Related Surgery: S/p bilateral mastectomy with right ALND. Pathology revealed 3.0cm of invasive ductal carcinoma, with 1/2 SLNs positive and 1/5 ALNs positive.  2019-4-19: New Treatment Plan: Faslodex discontinued. Letrozole restarted. Will continue on Ibrance  2020-9-11: New Treatment Plan: Faslodex resumed due to  progression in lung. Letrozole discontinued. Will remain on Ibrance  2022-7-2: New Treatment Plan: Started on Afinitor and Exemestane. Ibrance and Faslodex discontinued due to disease progression  2022-9-16: Disease Assessment: Interval increase in thoracic tumor burden  2022-10-7: New Treatment Plan: Started on Capecitabine 2000mg by mouth BID  2023-2-10: New treatment started with FAM-Trastuzumab Deruxtecan due to disease progression  2023-15-12: Patient underwent restaging scans. This showed disease progression in lung and liver with redemonstration of several bilateral pulmonary nodules/masses stable to increased in size since 09/01/2023, concerning for progressive metastatic disease and interval development of several hypoattenuating, some subtle, hepatic lesions as detailed above, concerning for metastatic disease.  12/26/2023: Patient underwent liver biopsy. This revealed metastatic mammry carcinoma with neuroendocrine features, ER+ 95%, VA positive 85% and Her2 negative 1+        History of Present Illness:      ID Statement:    TJ CROCKETT is a 63 year old Female        Chief Complaint: Here for a follow-up visit and to review liver biopsy results       Interval History:       INTERVAL HISTORY     Ms. Yue Barroso comes in for a follow-up visit.  She denies fever chills or rigors, n/v/d.     Last set of restaging scans were completed on  12/15/2023. This showed disease progression in lung and liver.  As a result a liver biopsy was completed on 12/26/2023. This revealed metastatic mammary carcinoma with neuroendocrine features, ER+ 95%, VA positive 85% and Her2 negative 1+. Results and treatment options were discussed with patient and her .     Her last echo was completed on 08/10/2023 which showed LVEF 60-65%     Allergies and Intolerances:       Allergies:         penicillin: Drug, Hives/Urticaria, Active     Outpatient Medication Profile:  *     Current Outpatient Medications:     amLODIPine  (Norvasc) 5 mg tablet, Take 1 tablet (5 mg) by mouth once daily., Disp: , Rfl:     calcium carbonate-vitamin D3 1,000 mg-20 mcg (800 unit) tablet, Take by mouth 2 times a day., Disp: , Rfl:     chlorthalidone (Hygroton) 25 mg tablet, Take 1 tablet (25 mg) by mouth once daily., Disp: , Rfl:     cholecalciferol (Vitamin D-3) 25 MCG (1000 UT) capsule, Take 1 capsule (25 mcg) by mouth once daily., Disp: , Rfl:     hydroCHLOROthiazide (HYDRODiuril) 25 mg tablet, Take 1 tablet (25 mg) by mouth once daily., Disp: , Rfl:     levothyroxine (Synthroid, Levoxyl) 100 mcg tablet, Take 1 tablet (100 mcg) by mouth once daily in the morning. Take before meals., Disp: , Rfl:     magnesium oxide (Mag-Ox) 400 mg (241.3 mg magnesium) tablet, Take 1 tablet (400 mg) by mouth once daily., Disp: , Rfl:     multivit-min-iron-FA-vit K-lut (Centrum Silver Women) 8 mg iron-400 mcg-50 mcg tablet, Take 1 tablet by mouth once daily., Disp: , Rfl:     NON FORMULARY, Take 1 each by mouth 2 times a day.  Turmeric Curcumin Oral Capsule, Disp: , Rfl:     ondansetron (Zofran) 8 mg tablet, Take 1 tablet (8 mg) by mouth every 8 hours if needed., Disp: , Rfl:     PaxiL 10 mg tablet, Take 1 tablet (10 mg) by mouth once daily., Disp: , Rfl:     levothyroxine (Synthroid, Levoxyl) 88 mcg tablet, , Disp: , Rfl:     multivit-minerals/folic acid (CENTRUM ADULTS ORAL), Take 1 tablet by mouth once daily., Disp: , Rfl:     potassium chloride (Klor-Con) 20 mEq packet, Take 20 mEq by mouth once daily., Disp: , Rfl:     potassium chloride CR 20 mEq ER tablet, Take 2 tablets (40 mEq) by mouth twice a day., Disp: , Rfl:   No current facility-administered medications for this visit.      Medical History:         High risk medication use: ICD-10: Z79.899, Status: Active         Breast cancer: ICD-10: C50.919, Status: Active         Recurrent Clostridium difficile diarrhea: ICD-10: A04.7, Status:  Active         Hypokalemia: ICD-10: E87.6, Status: Active         Malignant  "neoplasm of breast: ICD-10: C50.919, Status: Active         Social History:   Social Substance History:  ·  Smoking Status never smoker (1)   ·  Additional History     Breast Cancer Risk Factors:  , Had her menarche at age 10 years,  menopause 52 , used BCP x 2 years but never used HRT     Family History   Mother had ovarian cancer - age 65- BRCA negative s/p b/l salpingo oopherectomy.  Father  had colon cancer- age 56,  at 59 of metastatic disease.     Social History    Never smoker , social alcohol use  Active , exercises regularly, administrative job.       Performance:   ECOG Performance Status: 0 Fully Active         Vitals and Measurements:   Visit Vitals  /89   Pulse 100   Temp 36.7 °C (98.1 °F) (Temporal)   Resp 18   Ht 1.626 m (5' 4.02\")   Wt 87 kg (191 lb 12.8 oz)   SpO2 96%   BMI 32.91 kg/m²   Smoking Status Never   BSA 1.98 m²         Physical Exam:      Constitutional: AAOx3. appears comfortable.   Eyes: B/l GAEL. EOMI   ENMT: No oral ulcers or thrush. No pharyngeal congestion.   Head/Neck: No thyromegaly/JVD.   Respiratory/Thorax: B/l Air entry equal. No added  sounds.   Cardiovascular: S1s2+. No murmur/rub/Gallop   Gastrointestinal: Soft, Non tender. No hepatosplenomegaly.  BS+.   Extremities: No pedal edema.   Neurological: No focal motor deficits.   Breast: B/l s/p mastectomy, Lumpiness felt-stable  since last clinic visit.  No lymphedema   Lymphatic: No significant lymphadenopathy   Psychological: Appropriate mood and behavior   Skin: No rash/petechiae         Lab Results:     Lab Results   Component Value Date    WBC 6.0 2023    HGB 10.2 (L) 2023    HCT 30.4 (L) 2023    MCV 78 (L) 2023     2023        Lab Results   Component Value Date    NEUTROABS 3.59 2023          Chemistry    Lab Results   Component Value Date/Time     2023 1108    K 3.7 2023 1108     2023 1108    CO2 25 2023 1108    BUN 12 2023 " 1108    CREATININE 0.62 12/19/2023 1108    Lab Results   Component Value Date/Time    CALCIUM 9.8 12/19/2023 1108    ALKPHOS 105 12/19/2023 1108    AST 51 (H) 12/19/2023 1108    ALT 33 12/19/2023 1108    BILITOT 2.0 (H) 12/19/2023 1108               Radiology Result:     CT chest abdomen pelvis w IV contrast  Status: Final result      PACS Images      Show images for CT chest abdomen pelvis w IV contrast  In Basket Actions     Done  Result Mgmt     View in In Basket  Signed by     Signed Time Phone Pager   Franklin Torres MD 12/16/2023 11:49 [phone]        Exam Information     Status Exam Begun Exam Ended   Final 12/15/2023 09:49 12/15/2023 11:12      Study Result     Narrative & Impression   Interpreted By:  Franklin Torres,   STUDY:  CT CHEST ABDOMEN PELVIS W IV CONTRAST;  12/15/2023 11:12 am      INDICATION:  Signs/Symptoms:restaging.      COMPARISON:  Multiple CTs of the chest, abdomen and pelvis most recently 09/01/2023      ACCESSION NUMBER(S):  [id number]      ORDERING CLINICIAN:  OTTONIEL POWERS      TECHNIQUE:  CT of the chest, abdomen, and pelvis was performed.  Contiguous axial  images were obtained at 3 mm slice thickness through the chest,  abdomen and pelvis. Coronal and sagittal reconstructions at 3 mm  slice thickness were performed.  75 ml of contrast Omnipaque 350 were administered intravenously  without immediate complication.      FINDINGS:  CHEST:      LUNG/PLEURA/LARGE AIRWAYS:  Central airways are patent without endobronchial lesions.  Redemonstration of several pulmonary nodules/masses, stable to  increased in size since 09/01/2023. for example a right lower lobe  4.6 x 3.9 cm mass (series 204, image 184), previously 3.8 x 3.3 cm;  0.8 x 0.8 cm unchanged right lower lobe nodule (image 174); 17 x 16  mm lingular nodule (image 160), unchanged; a 12 x 9 mm left apical  nodule (image 39), unchanged. Multiple other pulmonary nodules are  noted. There is a stable 4.3 x 2.6 cm airspace  opacity in the right  perihilar/infrahilar region with dense calcification, which likely  represent sequela of treatment changes and is unchanged several  examinations. Mild bibasilar atelectasis. No pneumothorax. No pleural  effusion. Patchy opacities in the lingula and anterior right middle  lobe, compatible with atelectasis.      VESSELS:  Aorta and pulmonary arteries are normal caliber.  Mild  atherosclerotic changes are noted of the aorta and branching vessels.  No coronary artery calcifications are present.      HEART:  Normal size. No pericardial effusion      MEDIASTINUM AND NAM:  No mediastinal, hilar or axillary lymphadenopathy is present.  Multiple calcified hilar and right mediastinal nodes, likely prior  treatment changes. The esophagus is nondilated and appears normal in  entire length.      CHEST WALL AND LOWER NECK:  Bilateral mastectomies and stable bilateral breast fat necrosis.  Multiple surgical clips bilateral axilla. Stable right port catheter.  No suspicious osseous lesions. Mild degenerative changes of the  thoracic spine. Thyroid gland is unremarkable.      ABDOMEN:      LIVER:  Diffuse hepatic steatosis. Compared with the CT from 09/01/2023,  there has been interval development of multiple, some subtle,  hypoattenuating/hypoenhancing lesions. For example a 17 mm segment IV  B lesion (series 201 image 84); 6 mm segment V lesion (image 101); 5  mm segment VI lesion (image 82); 10 mm posterior segment VII lesion  (image 80) and an 11 mm lateral segment segment VII lesion (image  67), all new since prior. The previously noted lesion seen in segment  VII is not visualized on today's examination.      BILE DUCTS:  The intrahepatic and extrahepatic ducts are not dilated.      GALLBLADDER:  The gallbladder is nondistended and without evidence of radiopaque  stones.      PANCREAS:  The pancreas appears unremarkable without evidence of ductal  dilatation or masses. The previously noted enhancing  lesion is less  prominent on today's examination.      SPLEEN:  The spleen is normal in size and demonstrates multiple calcific foci.      ADRENAL GLANDS:  3.2 cm right fat containing adrenal lesion, likely a lipid rich  adenoma or myelolipoma. Stable mild left adrenal thickening.      KIDNEYS AND URETERS:  The kidneys are normal in size and enhance symmetrically.  No  hydroureteronephrosis or nephroureterolithiasis is identified.      PELVIS:      BLADDER:  The urinary bladder appears normal without abnormal wall thickening.      REPRODUCTIVE ORGANS:  Bulky and enlarged diffusely calcified uterus, likely secondary to  large fibroids.      BOWEL:  The stomach is unremarkable. Small duodenal diverticulum. No bowel  dilation or wall thickening. Colonic diverticulosis without  diverticulitis. Moderate colonic stool. Appendix is normal.          VESSELS:  There is no aneurysmal dilatation of the abdominal aorta. The IVC  appears normal. Mild atherosclerotic calcification of the abdominal  aorta and iliac arteries      PERITONEUM/RETROPERITONEUM/LYMPH NODES:  There is no free or loculated fluid collection, no free  intraperitoneal air. The retroperitoneum appears normal.  No  abdominopelvic lymphadenopathy is present.      BONE AND SOFT TISSUE:  No suspicious osseous lesions are identified. Degenerative discogenic  disease is noted in the lower thoracic and lumbar spine.  Multiple  bilateral retro gluteal injection. Small fat containing umbilical  hernia.      IMPRESSION:  CHEST:  1.  Redemonstration of several bilateral pulmonary nodules/masses  stable to increased in size since 09/01/2023, concerning for  progressive metastatic disease.  2. No suspicious thoracic adenopathy.  3. Other stable chronic findings as described above.      ABDOMEN-PELVIS:  1.  Interval development of several hypoattenuating, some subtle,  hepatic lesions as detailed above, concerning for metastatic disease.  Recommend further evaluation with  MRI of the liver with and without  contrast, preferably with Eovist contrast.  2.  Otherwise, no new evidence of metastatic disease in the abdomen  and pelvis.  3. Other stable chronic findings as described above.             Assessment and Plan:   Assessment:    63 year old post menopausal woman with stage IV breast cancer, invasive ductal carcinoma, ER/ND positive, HER 2 negative. s/p bilateral mastectomies in 01/2019.       She is currently on Enhertu. Friday will be C17 D1.      Last set of restaging scans were completed on  12/15/2023. This showed disease progression in lung and liver.  As a result a liver biopsy was completed on 12/26/2023. This revealed metastatic mammary carcinoma with neuroendocrine features, ER+ 95%, ND positive 85% and Her2 negative 1+. Results and treatment options were discussed with patient and her .     Treatment options include clinical trials participations, Elacestrant given that she has ESR1 Mutation,  and paliiative chemotherapy. Patient was leaning going back on CDK4/6 inhibitors with Ribociclib given that she was stable on Ibrance and Letrozole for >5 years. I will discuss with CTU regarding her clinical trails options before finalizing her treatment plan.    Of note patient has abnormal but stable LFTs for >one year due to fatty liver     Treatment will proceed on Friday with Enhertu until new treatment plan is formulated.         Plan:          Patient will receive Enhertu on Friday 1/12. That will be her last cycle  Discussion with CTU indicates no available triall at this time  Patient has ESR1 mutation and is eligible of Elascestrant. However treatment response to this drug is not that great. I will obtain [place] [address] CDx (next generation sequencing) blood test for a more updated genomic tumor profile  For now I recommend patient proceeding with Faslodex and Ribociclib. Although there is no data showing benefit of switching CDK4/6 inhibitors, patient has CCND1  amplification and remained stable on Ibrance x 5 years. 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

## 2024-01-11 RX ORDER — PROCHLORPERAZINE EDISYLATE 5 MG/ML
10 INJECTION INTRAMUSCULAR; INTRAVENOUS EVERY 6 HOURS PRN
Status: CANCELLED | OUTPATIENT
Start: 2024-01-12

## 2024-01-11 RX ORDER — DIPHENHYDRAMINE HYDROCHLORIDE 50 MG/ML
50 INJECTION INTRAMUSCULAR; INTRAVENOUS AS NEEDED
Status: CANCELLED | OUTPATIENT
Start: 2024-01-12

## 2024-01-11 RX ORDER — PROCHLORPERAZINE MALEATE 10 MG
10 TABLET ORAL EVERY 6 HOURS PRN
Status: CANCELLED | OUTPATIENT
Start: 2024-01-12

## 2024-01-11 RX ORDER — ALBUTEROL SULFATE 0.83 MG/ML
3 SOLUTION RESPIRATORY (INHALATION) AS NEEDED
Status: CANCELLED | OUTPATIENT
Start: 2024-01-12

## 2024-01-11 RX ORDER — EPINEPHRINE 0.3 MG/.3ML
0.3 INJECTION SUBCUTANEOUS EVERY 5 MIN PRN
Status: CANCELLED | OUTPATIENT
Start: 2024-01-12

## 2024-01-11 RX ORDER — FAMOTIDINE 10 MG/ML
20 INJECTION INTRAVENOUS ONCE AS NEEDED
Status: CANCELLED | OUTPATIENT
Start: 2024-01-12

## 2024-01-11 RX ORDER — PALONOSETRON 0.05 MG/ML
0.25 INJECTION, SOLUTION INTRAVENOUS ONCE
Status: CANCELLED | OUTPATIENT
Start: 2024-01-12

## 2024-01-12 ENCOUNTER — INFUSION (OUTPATIENT)
Dept: HEMATOLOGY/ONCOLOGY | Facility: HOSPITAL | Age: 64
End: 2024-01-12
Payer: MEDICARE

## 2024-01-12 ENCOUNTER — HOSPITAL ENCOUNTER (OUTPATIENT)
Dept: CARDIOLOGY | Facility: HOSPITAL | Age: 64
Discharge: HOME | End: 2024-01-12
Payer: MEDICARE

## 2024-01-12 ENCOUNTER — APPOINTMENT (OUTPATIENT)
Dept: HEMATOLOGY/ONCOLOGY | Facility: HOSPITAL | Age: 64
End: 2024-01-12
Payer: MEDICARE

## 2024-01-12 ENCOUNTER — SPECIALTY PHARMACY (OUTPATIENT)
Dept: PHARMACY | Facility: CLINIC | Age: 64
End: 2024-01-12

## 2024-01-12 VITALS
OXYGEN SATURATION: 98 % | HEART RATE: 80 BPM | RESPIRATION RATE: 16 BRPM | SYSTOLIC BLOOD PRESSURE: 128 MMHG | WEIGHT: 190.48 LBS | TEMPERATURE: 97.5 F | BODY MASS INDEX: 32.68 KG/M2 | DIASTOLIC BLOOD PRESSURE: 84 MMHG

## 2024-01-12 DIAGNOSIS — C50.919 MALIGNANT NEOPLASM OF FEMALE BREAST, UNSPECIFIED ESTROGEN RECEPTOR STATUS, UNSPECIFIED LATERALITY, UNSPECIFIED SITE OF BREAST (MULTI): ICD-10-CM

## 2024-01-12 LAB
ALBUMIN SERPL BCP-MCNC: 4.1 G/DL (ref 3.4–5)
ALP SERPL-CCNC: 128 U/L (ref 33–136)
ALT SERPL W P-5'-P-CCNC: 44 U/L (ref 7–45)
ANION GAP SERPL CALC-SCNC: 13 MMOL/L (ref 10–20)
AST SERPL W P-5'-P-CCNC: 63 U/L (ref 9–39)
BASOPHILS # BLD AUTO: 0.13 X10*3/UL (ref 0–0.1)
BASOPHILS NFR BLD AUTO: 2 %
BILIRUB SERPL-MCNC: 2 MG/DL (ref 0–1.2)
BUN SERPL-MCNC: 12 MG/DL (ref 6–23)
CALCIUM SERPL-MCNC: 9.3 MG/DL (ref 8.6–10.3)
CHLORIDE SERPL-SCNC: 102 MMOL/L (ref 98–107)
CO2 SERPL-SCNC: 29 MMOL/L (ref 21–32)
CREAT SERPL-MCNC: 0.54 MG/DL (ref 0.5–1.05)
DACRYOCYTES BLD QL SMEAR: NORMAL
EGFRCR SERPLBLD CKD-EPI 2021: >90 ML/MIN/1.73M*2
EOSINOPHIL # BLD AUTO: 0.47 X10*3/UL (ref 0–0.7)
EOSINOPHIL NFR BLD AUTO: 7.4 %
ERYTHROCYTE [DISTWIDTH] IN BLOOD BY AUTOMATED COUNT: 26.2 % (ref 11.5–14.5)
GLUCOSE SERPL-MCNC: 126 MG/DL (ref 74–99)
HCT VFR BLD AUTO: 30.8 % (ref 36–46)
HGB BLD-MCNC: 10 G/DL (ref 12–16)
IMM GRANULOCYTES # BLD AUTO: 0.02 X10*3/UL (ref 0–0.7)
IMM GRANULOCYTES NFR BLD AUTO: 0.3 % (ref 0–0.9)
LYMPHOCYTES # BLD AUTO: 1.2 X10*3/UL (ref 1.2–4.8)
LYMPHOCYTES NFR BLD AUTO: 18.8 %
MCH RBC QN AUTO: 26 PG (ref 26–34)
MCHC RBC AUTO-ENTMCNC: 32.5 G/DL (ref 32–36)
MCV RBC AUTO: 80 FL (ref 80–100)
MONOCYTES # BLD AUTO: 0.77 X10*3/UL (ref 0.1–1)
MONOCYTES NFR BLD AUTO: 12.1 %
NEUTROPHILS # BLD AUTO: 3.78 X10*3/UL (ref 1.2–7.7)
NEUTROPHILS NFR BLD AUTO: 59.4 %
NRBC BLD-RTO: 0 /100 WBCS (ref 0–0)
OVALOCYTES BLD QL SMEAR: NORMAL
PLATELET # BLD AUTO: 227 X10*3/UL (ref 150–450)
POLYCHROMASIA BLD QL SMEAR: NORMAL
POTASSIUM SERPL-SCNC: 3.6 MMOL/L (ref 3.5–5.3)
PROT SERPL-MCNC: 7.1 G/DL (ref 6.4–8.2)
RBC # BLD AUTO: 3.84 X10*6/UL (ref 4–5.2)
RBC MORPH BLD: NORMAL
SCHISTOCYTES BLD QL SMEAR: NORMAL
SODIUM SERPL-SCNC: 140 MMOL/L (ref 136–145)
WBC # BLD AUTO: 6.4 X10*3/UL (ref 4.4–11.3)

## 2024-01-12 PROCEDURE — 2500000004 HC RX 250 GENERAL PHARMACY W/ HCPCS (ALT 636 FOR OP/ED): Mod: JZ | Performed by: INTERNAL MEDICINE

## 2024-01-12 PROCEDURE — 84075 ASSAY ALKALINE PHOSPHATASE: CPT | Performed by: INTERNAL MEDICINE

## 2024-01-12 PROCEDURE — 96375 TX/PRO/DX INJ NEW DRUG ADDON: CPT | Mod: INF

## 2024-01-12 PROCEDURE — 93010 ELECTROCARDIOGRAM REPORT: CPT | Performed by: INTERNAL MEDICINE

## 2024-01-12 PROCEDURE — 2500000004 HC RX 250 GENERAL PHARMACY W/ HCPCS (ALT 636 FOR OP/ED): Performed by: PHARMACIST

## 2024-01-12 PROCEDURE — 93005 ELECTROCARDIOGRAM TRACING: CPT

## 2024-01-12 PROCEDURE — 96413 CHEMO IV INFUSION 1 HR: CPT

## 2024-01-12 PROCEDURE — 85025 COMPLETE CBC W/AUTO DIFF WBC: CPT | Performed by: INTERNAL MEDICINE

## 2024-01-12 RX ORDER — DIPHENHYDRAMINE HYDROCHLORIDE 50 MG/ML
50 INJECTION INTRAMUSCULAR; INTRAVENOUS AS NEEDED
Status: DISCONTINUED | OUTPATIENT
Start: 2024-01-12 | End: 2024-01-12 | Stop reason: HOSPADM

## 2024-01-12 RX ORDER — EPINEPHRINE 0.3 MG/.3ML
0.3 INJECTION SUBCUTANEOUS EVERY 5 MIN PRN
Status: DISCONTINUED | OUTPATIENT
Start: 2024-01-12 | End: 2024-01-12 | Stop reason: HOSPADM

## 2024-01-12 RX ORDER — PALONOSETRON 0.05 MG/ML
0.25 INJECTION, SOLUTION INTRAVENOUS ONCE
Status: COMPLETED | OUTPATIENT
Start: 2024-01-12 | End: 2024-01-12

## 2024-01-12 RX ORDER — HEPARIN SODIUM,PORCINE/PF 10 UNIT/ML
50 SYRINGE (ML) INTRAVENOUS AS NEEDED
Status: CANCELLED | OUTPATIENT
Start: 2024-01-12

## 2024-01-12 RX ORDER — PROCHLORPERAZINE EDISYLATE 5 MG/ML
10 INJECTION INTRAMUSCULAR; INTRAVENOUS EVERY 6 HOURS PRN
Status: DISCONTINUED | OUTPATIENT
Start: 2024-01-12 | End: 2024-01-12 | Stop reason: HOSPADM

## 2024-01-12 RX ORDER — HEPARIN SODIUM,PORCINE/PF 10 UNIT/ML
50 SYRINGE (ML) INTRAVENOUS AS NEEDED
Status: DISCONTINUED | OUTPATIENT
Start: 2024-01-12 | End: 2024-01-12 | Stop reason: HOSPADM

## 2024-01-12 RX ORDER — HEPARIN 100 UNIT/ML
500 SYRINGE INTRAVENOUS AS NEEDED
Status: DISCONTINUED | OUTPATIENT
Start: 2024-01-12 | End: 2024-01-12 | Stop reason: HOSPADM

## 2024-01-12 RX ORDER — ALBUTEROL SULFATE 0.83 MG/ML
3 SOLUTION RESPIRATORY (INHALATION) AS NEEDED
Status: DISCONTINUED | OUTPATIENT
Start: 2024-01-12 | End: 2024-01-12 | Stop reason: HOSPADM

## 2024-01-12 RX ORDER — HEPARIN 100 UNIT/ML
500 SYRINGE INTRAVENOUS AS NEEDED
Status: CANCELLED | OUTPATIENT
Start: 2024-01-12

## 2024-01-12 RX ORDER — PROCHLORPERAZINE MALEATE 10 MG
10 TABLET ORAL EVERY 6 HOURS PRN
Status: DISCONTINUED | OUTPATIENT
Start: 2024-01-12 | End: 2024-01-12 | Stop reason: HOSPADM

## 2024-01-12 RX ORDER — FAMOTIDINE 10 MG/ML
20 INJECTION INTRAVENOUS ONCE AS NEEDED
Status: DISCONTINUED | OUTPATIENT
Start: 2024-01-12 | End: 2024-01-12 | Stop reason: HOSPADM

## 2024-01-12 RX ADMIN — HEPARIN 500 UNITS: 100 SYRINGE at 16:35

## 2024-01-12 RX ADMIN — DEXAMETHASONE SODIUM PHOSPHATE 12 MG: 10 INJECTION, SOLUTION INTRAMUSCULAR; INTRAVENOUS at 15:33

## 2024-01-12 RX ADMIN — FAM-TRASTUZUMAB DERUXTECAN-NXKI 400 MG: 100 INJECTION, POWDER, LYOPHILIZED, FOR SOLUTION INTRAVENOUS at 15:54

## 2024-01-12 RX ADMIN — PALONOSETRON 250 MCG: 0.05 INJECTION, SOLUTION INTRAVENOUS at 15:33

## 2024-01-12 ASSESSMENT — PAIN SCALES - GENERAL: PAINLEVEL: 0-NO PAIN

## 2024-01-12 NOTE — SIGNIFICANT EVENT
01/12/24 1525   Prechemo Checklist   Has the patient been in the hospital, ED, or urgent care since last date of service No   Chemo/Immuno Consent Signed Yes   Protocol/Indications Verified Yes   Confirmed to previous date/time of medication Yes   Compared to previous dose Yes   All medications are dated accurately Yes   Pregnancy Test Negative Not applicable   Parameters Met Yes   BSA/Weight-Height Verified Yes   Dose Calculations Verified Yes

## 2024-01-15 ENCOUNTER — TELEPHONE (OUTPATIENT)
Dept: ADMISSION | Facility: HOSPITAL | Age: 64
End: 2024-01-15
Payer: MEDICARE

## 2024-01-15 ENCOUNTER — SPECIALTY PHARMACY (OUTPATIENT)
Dept: PHARMACY | Facility: CLINIC | Age: 64
End: 2024-01-15

## 2024-01-15 NOTE — TELEPHONE ENCOUNTER
Pt checking to see when orders will be placed for faslodex/ribociclib- per notes pt is to start on 2/1.   Pt also checking to see if any way texts for scheduling EKG can be stopped as she knows she needs to do them, and there is no appt necessary- it is a walk in procedure.

## 2024-01-17 DIAGNOSIS — C50.919 MALIGNANT NEOPLASM OF FEMALE BREAST, UNSPECIFIED ESTROGEN RECEPTOR STATUS, UNSPECIFIED LATERALITY, UNSPECIFIED SITE OF BREAST (MULTI): ICD-10-CM

## 2024-01-17 LAB
ATRIAL RATE: 70 BPM
P AXIS: 36 DEGREES
P OFFSET: 199 MS
P ONSET: 140 MS
PR INTERVAL: 164 MS
Q ONSET: 222 MS
QRS COUNT: 11 BEATS
QRS DURATION: 114 MS
QT INTERVAL: 408 MS
QTC CALCULATION(BAZETT): 440 MS
QTC FREDERICIA: 429 MS
R AXIS: -14 DEGREES
T AXIS: 49 DEGREES
T OFFSET: 426 MS
VENTRICULAR RATE: 70 BPM

## 2024-01-19 ENCOUNTER — SPECIALTY PHARMACY (OUTPATIENT)
Dept: PHARMACY | Facility: CLINIC | Age: 64
End: 2024-01-19

## 2024-01-25 ENCOUNTER — SPECIALTY PHARMACY (OUTPATIENT)
Dept: PHARMACY | Facility: CLINIC | Age: 64
End: 2024-01-25

## 2024-01-29 ENCOUNTER — SPECIALTY PHARMACY (OUTPATIENT)
Dept: HEMATOLOGY/ONCOLOGY | Facility: CLINIC | Age: 64
End: 2024-01-29
Payer: MEDICARE

## 2024-01-29 ENCOUNTER — SPECIALTY PHARMACY (OUTPATIENT)
Dept: PHARMACY | Facility: CLINIC | Age: 64
End: 2024-01-29

## 2024-01-29 DIAGNOSIS — C50.919 MALIGNANT NEOPLASM OF FEMALE BREAST, UNSPECIFIED ESTROGEN RECEPTOR STATUS, UNSPECIFIED LATERALITY, UNSPECIFIED SITE OF BREAST (MULTI): ICD-10-CM

## 2024-01-29 PROCEDURE — RXMED WILLOW AMBULATORY MEDICATION CHARGE

## 2024-01-31 ENCOUNTER — PHARMACY VISIT (OUTPATIENT)
Dept: PHARMACY | Facility: CLINIC | Age: 64
End: 2024-01-31
Payer: COMMERCIAL

## 2024-02-01 RX ORDER — PROCHLORPERAZINE EDISYLATE 5 MG/ML
10 INJECTION INTRAMUSCULAR; INTRAVENOUS EVERY 6 HOURS PRN
Status: CANCELLED | OUTPATIENT
Start: 2024-02-02

## 2024-02-01 RX ORDER — DIPHENHYDRAMINE HYDROCHLORIDE 50 MG/ML
50 INJECTION INTRAMUSCULAR; INTRAVENOUS AS NEEDED
Status: CANCELLED | OUTPATIENT
Start: 2024-03-01

## 2024-02-01 RX ORDER — FAMOTIDINE 10 MG/ML
20 INJECTION INTRAVENOUS ONCE AS NEEDED
Status: CANCELLED | OUTPATIENT
Start: 2024-02-16

## 2024-02-01 RX ORDER — PROCHLORPERAZINE EDISYLATE 5 MG/ML
10 INJECTION INTRAMUSCULAR; INTRAVENOUS EVERY 6 HOURS PRN
Status: CANCELLED | OUTPATIENT
Start: 2024-02-16

## 2024-02-01 RX ORDER — LAMOTRIGINE 25 MG/1
500 TABLET ORAL ONCE
Status: CANCELLED | OUTPATIENT
Start: 2024-03-01

## 2024-02-01 RX ORDER — PROCHLORPERAZINE EDISYLATE 5 MG/ML
10 INJECTION INTRAMUSCULAR; INTRAVENOUS EVERY 6 HOURS PRN
Status: CANCELLED | OUTPATIENT
Start: 2024-03-01

## 2024-02-01 RX ORDER — LAMOTRIGINE 25 MG/1
500 TABLET ORAL ONCE
Status: CANCELLED | OUTPATIENT
Start: 2024-02-16

## 2024-02-01 RX ORDER — EPINEPHRINE 0.3 MG/.3ML
0.3 INJECTION SUBCUTANEOUS EVERY 5 MIN PRN
Status: CANCELLED | OUTPATIENT
Start: 2024-03-01

## 2024-02-01 RX ORDER — PROCHLORPERAZINE MALEATE 10 MG
10 TABLET ORAL EVERY 6 HOURS PRN
Status: CANCELLED | OUTPATIENT
Start: 2024-03-01

## 2024-02-01 RX ORDER — ALBUTEROL SULFATE 0.83 MG/ML
3 SOLUTION RESPIRATORY (INHALATION) AS NEEDED
Status: CANCELLED | OUTPATIENT
Start: 2024-03-01

## 2024-02-01 RX ORDER — EPINEPHRINE 0.3 MG/.3ML
0.3 INJECTION SUBCUTANEOUS EVERY 5 MIN PRN
Status: CANCELLED | OUTPATIENT
Start: 2024-02-16

## 2024-02-01 RX ORDER — PROCHLORPERAZINE MALEATE 10 MG
10 TABLET ORAL EVERY 6 HOURS PRN
Status: CANCELLED | OUTPATIENT
Start: 2024-02-02

## 2024-02-01 RX ORDER — FAMOTIDINE 10 MG/ML
20 INJECTION INTRAVENOUS ONCE AS NEEDED
Status: CANCELLED | OUTPATIENT
Start: 2024-03-01

## 2024-02-01 RX ORDER — DIPHENHYDRAMINE HYDROCHLORIDE 50 MG/ML
50 INJECTION INTRAMUSCULAR; INTRAVENOUS AS NEEDED
Status: CANCELLED | OUTPATIENT
Start: 2024-02-16

## 2024-02-01 RX ORDER — PROCHLORPERAZINE MALEATE 10 MG
10 TABLET ORAL EVERY 6 HOURS PRN
Status: CANCELLED | OUTPATIENT
Start: 2024-02-16

## 2024-02-01 RX ORDER — ALBUTEROL SULFATE 0.83 MG/ML
3 SOLUTION RESPIRATORY (INHALATION) AS NEEDED
Status: CANCELLED | OUTPATIENT
Start: 2024-02-16

## 2024-02-02 ENCOUNTER — INFUSION (OUTPATIENT)
Dept: HEMATOLOGY/ONCOLOGY | Facility: HOSPITAL | Age: 64
End: 2024-02-02
Payer: MEDICARE

## 2024-02-02 ENCOUNTER — ONCOLOGY MEDICATION OUTREACH (OUTPATIENT)
Dept: HEMATOLOGY/ONCOLOGY | Facility: CLINIC | Age: 64
End: 2024-02-02

## 2024-02-02 ENCOUNTER — NURSE TRIAGE (OUTPATIENT)
Dept: ADMISSION | Facility: HOSPITAL | Age: 64
End: 2024-02-02

## 2024-02-02 VITALS
SYSTOLIC BLOOD PRESSURE: 132 MMHG | HEART RATE: 79 BPM | BODY MASS INDEX: 32.57 KG/M2 | DIASTOLIC BLOOD PRESSURE: 88 MMHG | OXYGEN SATURATION: 96 % | TEMPERATURE: 98.4 F | WEIGHT: 189.82 LBS | RESPIRATION RATE: 18 BRPM

## 2024-02-02 DIAGNOSIS — Z17.0 MALIGNANT NEOPLASM OF OVERLAPPING SITES OF BREAST IN FEMALE, ESTROGEN RECEPTOR POSITIVE, UNSPECIFIED LATERALITY (MULTI): Primary | ICD-10-CM

## 2024-02-02 DIAGNOSIS — C50.919 MALIGNANT NEOPLASM OF FEMALE BREAST, UNSPECIFIED ESTROGEN RECEPTOR STATUS, UNSPECIFIED LATERALITY, UNSPECIFIED SITE OF BREAST (MULTI): Primary | ICD-10-CM

## 2024-02-02 DIAGNOSIS — C50.819 MALIGNANT NEOPLASM OF OVERLAPPING SITES OF BREAST IN FEMALE, ESTROGEN RECEPTOR POSITIVE, UNSPECIFIED LATERALITY (MULTI): Primary | ICD-10-CM

## 2024-02-02 PROCEDURE — 2500000004 HC RX 250 GENERAL PHARMACY W/ HCPCS (ALT 636 FOR OP/ED): Mod: JZ | Performed by: INTERNAL MEDICINE

## 2024-02-02 PROCEDURE — 96372 THER/PROPH/DIAG INJ SC/IM: CPT | Performed by: INTERNAL MEDICINE

## 2024-02-02 PROCEDURE — 96402 CHEMO HORMON ANTINEOPL SQ/IM: CPT

## 2024-02-02 RX ORDER — CODEINE PHOSPHATE AND GUAIFENESIN 10; 100 MG/5ML; MG/5ML
5 SOLUTION ORAL EVERY 6 HOURS PRN
Qty: 100 ML | Refills: 1 | Status: SHIPPED | OUTPATIENT
Start: 2024-02-02 | End: 2024-02-09

## 2024-02-02 RX ORDER — ALBUTEROL SULFATE 0.83 MG/ML
3 SOLUTION RESPIRATORY (INHALATION) AS NEEDED
Status: DISCONTINUED | OUTPATIENT
Start: 2024-02-02 | End: 2024-02-02 | Stop reason: HOSPADM

## 2024-02-02 RX ORDER — DIPHENHYDRAMINE HYDROCHLORIDE 50 MG/ML
50 INJECTION INTRAMUSCULAR; INTRAVENOUS AS NEEDED
Status: DISCONTINUED | OUTPATIENT
Start: 2024-02-02 | End: 2024-02-02 | Stop reason: HOSPADM

## 2024-02-02 RX ORDER — LAMOTRIGINE 25 MG/1
500 TABLET ORAL ONCE
Status: COMPLETED | OUTPATIENT
Start: 2024-02-02 | End: 2024-02-02

## 2024-02-02 RX ORDER — EPINEPHRINE 0.3 MG/.3ML
0.3 INJECTION SUBCUTANEOUS EVERY 5 MIN PRN
Status: DISCONTINUED | OUTPATIENT
Start: 2024-02-02 | End: 2024-02-02 | Stop reason: HOSPADM

## 2024-02-02 RX ORDER — FAMOTIDINE 10 MG/ML
20 INJECTION INTRAVENOUS ONCE AS NEEDED
Status: DISCONTINUED | OUTPATIENT
Start: 2024-02-02 | End: 2024-02-02 | Stop reason: HOSPADM

## 2024-02-02 RX ADMIN — FULVESTRANT 500 MG: 50 INJECTION, SOLUTION INTRAMUSCULAR at 09:25

## 2024-02-02 ASSESSMENT — ENCOUNTER SYMPTOMS
LOSS OF SENSATION IN FEET: 0
DEPRESSION: 0
OCCASIONAL FEELINGS OF UNSTEADINESS: 0

## 2024-02-02 ASSESSMENT — PATIENT HEALTH QUESTIONNAIRE - PHQ9
SUM OF ALL RESPONSES TO PHQ9 QUESTIONS 1 AND 2: 0
1. LITTLE INTEREST OR PLEASURE IN DOING THINGS: NOT AT ALL
2. FEELING DOWN, DEPRESSED OR HOPELESS: NOT AT ALL

## 2024-02-02 ASSESSMENT — COLUMBIA-SUICIDE SEVERITY RATING SCALE - C-SSRS
2. HAVE YOU ACTUALLY HAD ANY THOUGHTS OF KILLING YOURSELF?: NO
1. IN THE PAST MONTH, HAVE YOU WISHED YOU WERE DEAD OR WISHED YOU COULD GO TO SLEEP AND NOT WAKE UP?: NO
6. HAVE YOU EVER DONE ANYTHING, STARTED TO DO ANYTHING, OR PREPARED TO DO ANYTHING TO END YOUR LIFE?: NO

## 2024-02-02 ASSESSMENT — PAIN SCALES - GENERAL: PAINLEVEL: 0-NO PAIN

## 2024-02-02 NOTE — TELEPHONE ENCOUNTER
I called patient back and let her know Dr. Kahn put through the new RX for the cough syrup to [facility].

## 2024-02-02 NOTE — TELEPHONE ENCOUNTER
I called Chio back to let her know Dr. Kahn messaged me back on secure chat and will be calling in Robitussin with Codeine to help with her cough. Patient said she will try that and follow-up with Dr. Kahn at her next visit.

## 2024-02-02 NOTE — TELEPHONE ENCOUNTER
I called [facility] and spoke with Mark and he said something similar would be:  Robitussin AC   Codeine/Guaifenesin  100-10mg/5mL.  He said patient has HBP so may not be the best idea but it should be ok. He said patient told him she is trying to control the cough. Messaged team and secured chat Dr. Kahn and added pharm to let them know the update from pharmacy.

## 2024-02-02 NOTE — TELEPHONE ENCOUNTER
Chio left V/M on nurse triage line concerning a cough. Calling back for more information.   Additional Information  • Are you coughing up mucous?     Not often but clear. Mostly dry cough  • Negative: Is coughing constant?     Mostly morning and at night but not while sleeping or during the main part of the day.  • Are medicines or other measures helping the cough?     Pt had Z-pack end of December from PCP and he gave her another Z-pack two weeks ago.  Cough gets better than comes back.  • Commented on: Review EMR for h/o immunotherapy. If positive, ask if patient is having new or worsening problems breathing - symptoms may be related to immune-related pneumonitis     No symptoms per patient besides mostly dry cough for around 6 weeks. Sometimes gets clear phlegm.  • Commented on: If yes: How much blood, is it bright red, are there blood clots? Is mucous pink and foamy or bubbly looking?     Clear mucous. Denies any blood in mucous.  • Commented on: When did these problems start?     This started end of December 2023.  • Commented on: How long have your problems been going on?     6 weeks approximately per pt.  • Commented on: What makes these problems worse?     Nothing makes worse.  • Commented on: What else do you want to tell me about this problem?     Pt wanted to know if Dr. Kahn suggests anything different for this cough or can prescribe something to help with cough besides Tessalon Pearles since didn't help cough. Pt denies any other symptoms besides the cough.    Protocols used: Cough

## 2024-02-02 NOTE — TELEPHONE ENCOUNTER
Chio called and said [facility] on [address] called her and said the Pseudophedrine codeine cough syrup is no longer available. I will call [facility] pharmacy right now to confirm.

## 2024-02-02 NOTE — TELEPHONE ENCOUNTER
I called Chio back and she wanted me to let Dr. Kahn know last week of December she had a cough with no fever, chills or other symptoms. She went to her PCP and tested negative for flu, RSV and covid. He gave her a Z-pack and Tessalon pearles. She said the cough was better at the end of the Z-Pack. Tessalon pearles didn't help her. This cough is mostly dry and on occasion has some clear phlegm. She said two weeks ago she went back to PCP since cough was still there. PCP prescribed another Z-pack to take. She said cough was better again but now it is still there in the morning and night. She doesn't cough if sleeping or too much during the day. She wanted to let Dr. Kahn know and see if she has any other suggestions of what this may be and if something she thinks will help for the cough besides the Tessalon Pearles. Patient also wanted to know if she should just let PCP know again. Messaged team and secured chat.

## 2024-02-07 ENCOUNTER — TELEPHONE (OUTPATIENT)
Dept: ADMISSION | Facility: HOSPITAL | Age: 64
End: 2024-02-07
Payer: MEDICARE

## 2024-02-07 NOTE — TELEPHONE ENCOUNTER
Patient requesting guaifenesin-codeine be sent to [facility] in [place] (added to med management) as [facility] does not have it and does not know when they will get in.

## 2024-02-08 DIAGNOSIS — Z17.0 MALIGNANT NEOPLASM OF UPPER-OUTER QUADRANT OF LEFT BREAST IN FEMALE, ESTROGEN RECEPTOR POSITIVE (MULTI): Primary | ICD-10-CM

## 2024-02-08 DIAGNOSIS — C50.412 MALIGNANT NEOPLASM OF UPPER-OUTER QUADRANT OF LEFT BREAST IN FEMALE, ESTROGEN RECEPTOR POSITIVE (MULTI): Primary | ICD-10-CM

## 2024-02-08 DIAGNOSIS — R05.9 COUGH IN ADULT PATIENT: ICD-10-CM

## 2024-02-08 RX ORDER — CODEINE PHOSPHATE AND GUAIFENESIN 10; 100 MG/5ML; MG/5ML
5 SOLUTION ORAL EVERY 6 HOURS PRN
Qty: 120 ML | Refills: 0 | Status: SHIPPED | OUTPATIENT
Start: 2024-02-08 | End: 2024-02-24 | Stop reason: HOSPADM

## 2024-02-08 NOTE — TELEPHONE ENCOUNTER
Pt checking on script for codeine guaifenesin being sent to [facility] as [facility] does not have the medication.   Pt has called multiple times.

## 2024-02-15 ENCOUNTER — HOSPITAL ENCOUNTER (OUTPATIENT)
Dept: CARDIOLOGY | Facility: HOSPITAL | Age: 64
Discharge: HOME | End: 2024-02-15
Payer: MEDICARE

## 2024-02-15 ENCOUNTER — LAB (OUTPATIENT)
Dept: HEMATOLOGY/ONCOLOGY | Facility: HOSPITAL | Age: 64
End: 2024-02-15
Payer: MEDICARE

## 2024-02-15 ENCOUNTER — APPOINTMENT (OUTPATIENT)
Dept: INFUSION THERAPY | Facility: HOSPITAL | Age: 64
End: 2024-02-15
Payer: MEDICARE

## 2024-02-15 ENCOUNTER — NURSE TRIAGE (OUTPATIENT)
Dept: ADMISSION | Facility: HOSPITAL | Age: 64
End: 2024-02-15

## 2024-02-15 ENCOUNTER — INFUSION (OUTPATIENT)
Dept: HEMATOLOGY/ONCOLOGY | Facility: HOSPITAL | Age: 64
End: 2024-02-15
Payer: MEDICARE

## 2024-02-15 VITALS
WEIGHT: 188.71 LBS | RESPIRATION RATE: 18 BRPM | OXYGEN SATURATION: 99 % | SYSTOLIC BLOOD PRESSURE: 146 MMHG | BODY MASS INDEX: 32.38 KG/M2 | TEMPERATURE: 99.9 F | DIASTOLIC BLOOD PRESSURE: 79 MMHG | HEART RATE: 92 BPM

## 2024-02-15 DIAGNOSIS — C50.919 MALIGNANT NEOPLASM OF FEMALE BREAST, UNSPECIFIED ESTROGEN RECEPTOR STATUS, UNSPECIFIED LATERALITY, UNSPECIFIED SITE OF BREAST (MULTI): ICD-10-CM

## 2024-02-15 DIAGNOSIS — Z17.0 MALIGNANT NEOPLASM OF OVERLAPPING SITES OF BREAST IN FEMALE, ESTROGEN RECEPTOR POSITIVE, UNSPECIFIED LATERALITY (MULTI): ICD-10-CM

## 2024-02-15 DIAGNOSIS — C50.819 MALIGNANT NEOPLASM OF OVERLAPPING SITES OF BREAST IN FEMALE, ESTROGEN RECEPTOR POSITIVE, UNSPECIFIED LATERALITY (MULTI): Primary | ICD-10-CM

## 2024-02-15 DIAGNOSIS — C50.819 MALIGNANT NEOPLASM OF OVERLAPPING SITES OF BREAST IN FEMALE, ESTROGEN RECEPTOR POSITIVE, UNSPECIFIED LATERALITY (MULTI): ICD-10-CM

## 2024-02-15 DIAGNOSIS — Z17.0 MALIGNANT NEOPLASM OF OVERLAPPING SITES OF BREAST IN FEMALE, ESTROGEN RECEPTOR POSITIVE, UNSPECIFIED LATERALITY (MULTI): Primary | ICD-10-CM

## 2024-02-15 LAB
ALBUMIN SERPL BCP-MCNC: 4 G/DL (ref 3.4–5)
ALP SERPL-CCNC: 260 U/L (ref 33–136)
ALT SERPL W P-5'-P-CCNC: 74 U/L (ref 7–45)
ANION GAP SERPL CALC-SCNC: 14 MMOL/L (ref 10–20)
AST SERPL W P-5'-P-CCNC: 97 U/L (ref 9–39)
ATRIAL RATE: 92 BPM
BASOPHILS # BLD AUTO: 0.05 X10*3/UL (ref 0–0.1)
BASOPHILS NFR BLD AUTO: 1.3 %
BILIRUB SERPL-MCNC: 2.5 MG/DL (ref 0–1.2)
BUN SERPL-MCNC: 12 MG/DL (ref 6–23)
CALCIUM SERPL-MCNC: 9.6 MG/DL (ref 8.6–10.3)
CHLORIDE SERPL-SCNC: 100 MMOL/L (ref 98–107)
CO2 SERPL-SCNC: 26 MMOL/L (ref 21–32)
CREAT SERPL-MCNC: 0.77 MG/DL (ref 0.5–1.05)
DACRYOCYTES BLD QL SMEAR: NORMAL
EGFRCR SERPLBLD CKD-EPI 2021: 86 ML/MIN/1.73M*2
EOSINOPHIL # BLD AUTO: 0.13 X10*3/UL (ref 0–0.7)
EOSINOPHIL NFR BLD AUTO: 3.3 %
ERYTHROCYTE [DISTWIDTH] IN BLOOD BY AUTOMATED COUNT: 25 % (ref 11.5–14.5)
GLUCOSE SERPL-MCNC: 181 MG/DL (ref 74–99)
HCT VFR BLD AUTO: 28.7 % (ref 36–46)
HGB BLD-MCNC: 9.4 G/DL (ref 12–16)
IMM GRANULOCYTES # BLD AUTO: 0.02 X10*3/UL (ref 0–0.7)
IMM GRANULOCYTES NFR BLD AUTO: 0.5 % (ref 0–0.9)
LYMPHOCYTES # BLD AUTO: 0.69 X10*3/UL (ref 1.2–4.8)
LYMPHOCYTES NFR BLD AUTO: 17.6 %
MCH RBC QN AUTO: 27 PG (ref 26–34)
MCHC RBC AUTO-ENTMCNC: 32.8 G/DL (ref 32–36)
MCV RBC AUTO: 83 FL (ref 80–100)
MONOCYTES # BLD AUTO: 0.23 X10*3/UL (ref 0.1–1)
MONOCYTES NFR BLD AUTO: 5.9 %
NEUTROPHILS # BLD AUTO: 2.8 X10*3/UL (ref 1.2–7.7)
NEUTROPHILS NFR BLD AUTO: 71.4 %
NRBC BLD-RTO: 0 /100 WBCS (ref 0–0)
OVALOCYTES BLD QL SMEAR: NORMAL
P AXIS: 25 DEGREES
P OFFSET: 200 MS
P ONSET: 164 MS
PLATELET # BLD AUTO: 102 X10*3/UL (ref 150–450)
POLYCHROMASIA BLD QL SMEAR: NORMAL
POTASSIUM SERPL-SCNC: 3.5 MMOL/L (ref 3.5–5.3)
PR INTERVAL: 118 MS
PROT SERPL-MCNC: 7.3 G/DL (ref 6.4–8.2)
Q ONSET: 223 MS
QRS COUNT: 15 BEATS
QRS DURATION: 112 MS
QT INTERVAL: 392 MS
QTC CALCULATION(BAZETT): 484 MS
QTC FREDERICIA: 452 MS
R AXIS: -10 DEGREES
RBC # BLD AUTO: 3.48 X10*6/UL (ref 4–5.2)
RBC MORPH BLD: NORMAL
SCHISTOCYTES BLD QL SMEAR: NORMAL
SODIUM SERPL-SCNC: 136 MMOL/L (ref 136–145)
T AXIS: 30 DEGREES
T OFFSET: 419 MS
VENTRICULAR RATE: 92 BPM
WBC # BLD AUTO: 3.9 X10*3/UL (ref 4.4–11.3)

## 2024-02-15 PROCEDURE — 93010 ELECTROCARDIOGRAM REPORT: CPT | Performed by: INTERNAL MEDICINE

## 2024-02-15 PROCEDURE — 2500000004 HC RX 250 GENERAL PHARMACY W/ HCPCS (ALT 636 FOR OP/ED): Performed by: PHARMACIST

## 2024-02-15 PROCEDURE — 80053 COMPREHEN METABOLIC PANEL: CPT

## 2024-02-15 PROCEDURE — 96401 CHEMO ANTI-NEOPL SQ/IM: CPT | Performed by: INTERNAL MEDICINE

## 2024-02-15 PROCEDURE — 96372 THER/PROPH/DIAG INJ SC/IM: CPT

## 2024-02-15 PROCEDURE — 2500000004 HC RX 250 GENERAL PHARMACY W/ HCPCS (ALT 636 FOR OP/ED): Mod: JZ | Performed by: INTERNAL MEDICINE

## 2024-02-15 PROCEDURE — 93005 ELECTROCARDIOGRAM TRACING: CPT

## 2024-02-15 PROCEDURE — 96372 THER/PROPH/DIAG INJ SC/IM: CPT | Performed by: INTERNAL MEDICINE

## 2024-02-15 PROCEDURE — 36591 DRAW BLOOD OFF VENOUS DEVICE: CPT

## 2024-02-15 PROCEDURE — 85025 COMPLETE CBC W/AUTO DIFF WBC: CPT

## 2024-02-15 RX ORDER — PROCHLORPERAZINE EDISYLATE 5 MG/ML
10 INJECTION INTRAMUSCULAR; INTRAVENOUS EVERY 6 HOURS PRN
Status: DISCONTINUED | OUTPATIENT
Start: 2024-02-15 | End: 2024-02-15 | Stop reason: HOSPADM

## 2024-02-15 RX ORDER — PROCHLORPERAZINE MALEATE 10 MG
10 TABLET ORAL EVERY 6 HOURS PRN
Status: DISCONTINUED | OUTPATIENT
Start: 2024-02-15 | End: 2024-02-15 | Stop reason: HOSPADM

## 2024-02-15 RX ORDER — HEPARIN 100 UNIT/ML
500 SYRINGE INTRAVENOUS AS NEEDED
Status: DISCONTINUED | OUTPATIENT
Start: 2024-02-15 | End: 2024-02-15 | Stop reason: HOSPADM

## 2024-02-15 RX ORDER — HEPARIN SODIUM,PORCINE/PF 10 UNIT/ML
50 SYRINGE (ML) INTRAVENOUS AS NEEDED
Status: CANCELLED | OUTPATIENT
Start: 2024-02-15

## 2024-02-15 RX ORDER — LAMOTRIGINE 25 MG/1
500 TABLET ORAL ONCE
Status: COMPLETED | OUTPATIENT
Start: 2024-02-15 | End: 2024-02-15

## 2024-02-15 RX ORDER — HEPARIN 100 UNIT/ML
500 SYRINGE INTRAVENOUS AS NEEDED
Status: CANCELLED | OUTPATIENT
Start: 2024-02-15

## 2024-02-15 RX ADMIN — HEPARIN 500 UNITS: 100 SYRINGE at 12:52

## 2024-02-15 RX ADMIN — FULVESTRANT 500 MG: 50 INJECTION, SOLUTION INTRAMUSCULAR at 13:28

## 2024-02-15 NOTE — TELEPHONE ENCOUNTER
The patient states that she has had a cough since December and has been on multiple Z packs and now has a codeine cough syrup however she continues to have this dry, non productive cough, no sputum, no fevers, etc. States that she is wondering if a steroid would help, she is suprised with how long this cough has lasted and it is worse in the morning and at night but she is able to sleep through the night OK. Wondering what Dr. Kahn thinks, message routed to provider. If steroid indicated, hoping it could go to the [facility] in [place] on [address]

## 2024-02-15 NOTE — PROGRESS NOTES
Patient arrived to infusion for faslodex injection. Patient complains of cough since December, states prescribed medication is only helping moderately. Dr. Kahn notified. Faslodex injection given, patient tolerated well. Patient discharged in stable condition. Dr. Kahn ordered chest CT, patient made aware that  would be calling to schedule appointment. No further questions from patient at this time.

## 2024-02-16 ENCOUNTER — HOSPITAL ENCOUNTER (OUTPATIENT)
Dept: RADIOLOGY | Facility: CLINIC | Age: 64
Discharge: HOME | End: 2024-02-16
Payer: MEDICARE

## 2024-02-16 ENCOUNTER — ONCOLOGY MEDICATION OUTREACH (OUTPATIENT)
Dept: HEMATOLOGY/ONCOLOGY | Facility: CLINIC | Age: 64
End: 2024-02-16
Payer: MEDICARE

## 2024-02-16 DIAGNOSIS — C50.819 MALIGNANT NEOPLASM OF OVERLAPPING SITES OF BREAST IN FEMALE, ESTROGEN RECEPTOR POSITIVE, UNSPECIFIED LATERALITY (MULTI): ICD-10-CM

## 2024-02-16 DIAGNOSIS — Z17.0 MALIGNANT NEOPLASM OF OVERLAPPING SITES OF BREAST IN FEMALE, ESTROGEN RECEPTOR POSITIVE, UNSPECIFIED LATERALITY (MULTI): ICD-10-CM

## 2024-02-16 PROCEDURE — 71260 CT THORAX DX C+: CPT | Mod: FOREIGN READ | Performed by: RADIOLOGY

## 2024-02-16 PROCEDURE — 71260 CT THORAX DX C+: CPT

## 2024-02-16 PROCEDURE — 2550000001 HC RX 255 CONTRASTS: Performed by: INTERNAL MEDICINE

## 2024-02-16 RX ADMIN — IOHEXOL 50 ML: 350 INJECTION, SOLUTION INTRAVENOUS at 13:32

## 2024-02-16 NOTE — TELEPHONE ENCOUNTER
Pt has CT scan set up for today, apt with Dr. Kahn next week. She is aware of plan and denied further questions

## 2024-02-19 ENCOUNTER — TELEPHONE (OUTPATIENT)
Dept: PALLIATIVE MEDICINE | Facility: CLINIC | Age: 64
End: 2024-02-19
Payer: MEDICARE

## 2024-02-19 ENCOUNTER — TELEPHONE (OUTPATIENT)
Dept: ADMISSION | Facility: HOSPITAL | Age: 64
End: 2024-02-19
Payer: MEDICARE

## 2024-02-19 NOTE — TELEPHONE ENCOUNTER
Patient referred to supportive oncology/palliative care by Dr. BINTA Kahn. Patient with metastatic breast cancer experiencing cough. [facility] approved referral. Phone call to patient who reports her cough has improved and she does not need the appointment at this time. Oncology updated and patient aware to contact us if would like to schedule.

## 2024-02-19 NOTE — TELEPHONE ENCOUNTER
Kary from the vascular center called triage line and said Chio has an appointment with Dr. aKhn tomorrow at noon. Kary said there is an order in for Onco Echo but order placed to get for tomorrow only and it interferes with Dr. Kahn's appointment time. Kary said if has to be tomorrow like the order says they can do this Echo in [facility] at 11:30 but Dr. Kahn's appointment time would need changed or put order in with not just tomorrow for the echo and then they can schedule patient on a different day. Kary said Pt agreed would go to [facility] for test tomorrow. Secured chat Georgiana since needed to call Vascular back.

## 2024-02-19 NOTE — TELEPHONE ENCOUNTER
I confirmed with Georgiana that Dr. Kahn can see patient now at 3:00 tomorrow 2/20 so the patient can get the Onc/Echo done at [facility] at 11:30. Kary in Vascular was sent secure chat as requested of the updated information and told to let patient know changes.

## 2024-02-20 ENCOUNTER — HOSPITAL ENCOUNTER (OUTPATIENT)
Dept: CARDIOLOGY | Facility: HOSPITAL | Age: 64
Discharge: HOME | DRG: 055 | End: 2024-02-20
Payer: MEDICARE

## 2024-02-20 ENCOUNTER — APPOINTMENT (OUTPATIENT)
Dept: HEMATOLOGY/ONCOLOGY | Facility: HOSPITAL | Age: 64
End: 2024-02-20
Payer: MEDICARE

## 2024-02-20 ENCOUNTER — OFFICE VISIT (OUTPATIENT)
Dept: HEMATOLOGY/ONCOLOGY | Facility: HOSPITAL | Age: 64
End: 2024-02-20
Payer: MEDICARE

## 2024-02-20 VITALS
HEIGHT: 64 IN | TEMPERATURE: 98.4 F | BODY MASS INDEX: 31.95 KG/M2 | OXYGEN SATURATION: 94 % | DIASTOLIC BLOOD PRESSURE: 84 MMHG | HEART RATE: 99 BPM | WEIGHT: 187.17 LBS | RESPIRATION RATE: 20 BRPM | SYSTOLIC BLOOD PRESSURE: 148 MMHG

## 2024-02-20 DIAGNOSIS — C50.819 OVERLAPPING MALIGNANT NEOPLASM OF FEMALE BREAST, UNSPECIFIED ESTROGEN RECEPTOR STATUS, UNSPECIFIED LATERALITY (MULTI): ICD-10-CM

## 2024-02-20 DIAGNOSIS — T45.1X5S ADVERSE EFFECT OF ANTINEOPLASTIC AND IMMUNOSUPPRESSIVE DRUGS, SEQUELA: ICD-10-CM

## 2024-02-20 DIAGNOSIS — C50.912 MALIGNANT NEOPLASM OF UNSPECIFIED SITE OF LEFT FEMALE BREAST (MULTI): ICD-10-CM

## 2024-02-20 PROCEDURE — 1036F TOBACCO NON-USER: CPT | Performed by: INTERNAL MEDICINE

## 2024-02-20 PROCEDURE — 3079F DIAST BP 80-89 MM HG: CPT | Performed by: INTERNAL MEDICINE

## 2024-02-20 PROCEDURE — 99215 OFFICE O/P EST HI 40 MIN: CPT | Performed by: INTERNAL MEDICINE

## 2024-02-20 PROCEDURE — 76376 3D RENDER W/INTRP POSTPROCES: CPT | Performed by: INTERNAL MEDICINE

## 2024-02-20 PROCEDURE — 93356 MYOCRD STRAIN IMG SPCKL TRCK: CPT | Performed by: INTERNAL MEDICINE

## 2024-02-20 PROCEDURE — 1126F AMNT PAIN NOTED NONE PRSNT: CPT | Performed by: INTERNAL MEDICINE

## 2024-02-20 PROCEDURE — 76376 3D RENDER W/INTRP POSTPROCES: CPT

## 2024-02-20 PROCEDURE — 1159F MED LIST DOCD IN RCRD: CPT | Performed by: INTERNAL MEDICINE

## 2024-02-20 PROCEDURE — 3077F SYST BP >= 140 MM HG: CPT | Performed by: INTERNAL MEDICINE

## 2024-02-20 PROCEDURE — 93325 DOPPLER ECHO COLOR FLOW MAPG: CPT | Performed by: INTERNAL MEDICINE

## 2024-02-20 PROCEDURE — 93308 TTE F-UP OR LMTD: CPT | Performed by: INTERNAL MEDICINE

## 2024-02-20 PROCEDURE — 93321 DOPPLER ECHO F-UP/LMTD STD: CPT | Performed by: INTERNAL MEDICINE

## 2024-02-20 RX ORDER — LORAZEPAM 1 MG/1
1 TABLET ORAL SEE ADMIN INSTRUCTIONS
Qty: 2 TABLET | Refills: 0 | Status: ON HOLD | OUTPATIENT
Start: 2024-02-20 | End: 2024-03-08 | Stop reason: WASHOUT

## 2024-02-20 ASSESSMENT — PAIN SCALES - GENERAL: PAINLEVEL: 0-NO PAIN

## 2024-02-20 NOTE — PROGRESS NOTES
Patient Visit Information:   Visit Type: Follow Up Visit      Cancer History:          Breast         AJCC Edition: 7th (AJCC), Diagnosis Date: 05-Jun-2015, IV, T2 N1 M1      Treatment Synopsis:    63-year-old postmenopausal  female with stage IV multifocal right-sided invasive ductal carcinoma with metastatic disease to the lungs bilaterally and to the liver.       The patient's breast cancer was diagnosed on June 5, 2015, and is estrogen receptor positive at 90%, progesterone receptor positive at 70%, and HER-2/jrodyn negative with neuroendocrine features. Staging CT at the time of initial diagnosis showed bilateral pulmonary nodules. Nodules were biospy-proven (07/01/2015)  to be consistent with metastatic mammary carcinoma with neuroendocrine features, ER 99%, NM 85%, and her2 negative (1+). Developed progression in liver. Liver biopsy  completed 12/26/2023 revealed metastatic mammary carcinoma with neuroendocrine features, ER+ 95%, NM positive 85% and Her2 negative 1+.     [facility] TESTING:      This revealed [id number] mutation; CCND1 amplification, [id number]; and [id number]     CURRENT THERAPY: Ribociclib and Faslodex        Treatment History:    2015-7-1: Cancer Staging: Lung biopsy: Metastatic mammary carcinoma with neuroendocrine features, ER 99%, NM 85%, and her2 negative.  2015-7-8: New Treatment Plan: Initiated on Letrozole 2.5mg by mouth daily plus pablociclib 125 mg by mouth daily 3 weeks on, one week off.  2018-8-10: New Treatment Plan: Letrozole discontinued. Patient started on Faslodex plus Ibrance due to slight disease progression in right breast  2019-1-16: Cancer Related Surgery: S/p bilateral mastectomy with right ALND. Pathology revealed 3.0cm of invasive ductal carcinoma, with 1/2 SLNs positive and 1/5 ALNs positive.  2019-4-19: New Treatment Plan: Faslodex discontinued. Letrozole restarted. Will continue on Ibrance  2020-9-11: New Treatment Plan: Faslodex resumed due to  progression in lung. Letrozole discontinued. Will remain on Ibrance  2022-7-2: New Treatment Plan: Started on Afinitor and Exemestane. Ibrance and Faslodex discontinued due to disease progression  2022-9-16: Disease Assessment: Interval increase in thoracic tumor burden  2022-10-7: New Treatment Plan: Started on Capecitabine 2000mg by mouth BID  2023-2-10: New treatment started with FAM-Trastuzumab Deruxtecan due to disease progression  2023-15-12: Patient underwent restaging scans. This showed disease progression in lung and liver with redemonstration of several bilateral pulmonary nodules/masses stable to increased in size since 09/01/2023, concerning for progressive metastatic disease and interval development of several hypoattenuating, some subtle, hepatic lesions as detailed above, concerning for metastatic disease.  12/26/2023: Patient underwent liver biopsy. This revealed metastatic mammry carcinoma with neuroendocrine features, ER+ 95%, IA positive 85% and Her2 negative 1+  1/12/2024: Received the last dose of Enhertu. Discontinued due to disease progression  02/01/2024: Switched to Ribociclib and Faslodex. This regimen selected because [facility] confirms CCND1 amplification and patient was stable on Ibrance for 5 years from [id number] and has thus far been the most effective regimen for patient.        History of Present Illness:      ID Statement:    TJ CROCKETT is a 63 year old Female        Chief Complaint: Here for a follow-up visit and to review chest CT that was ordered to evaluate persistent cough. Today C1 day 21.       Interval History:       INTERVAL HISTORY     Ms. Yue Barroso comes in for a follow-up visit.  She reports that her cough has now resolved. She denies fever chills or rigors, n/v/d.     Last set of restaging scans were completed on  12/15/2023. This showed disease progression in lung and liver.  As a result a liver biopsy was completed on 12/26/2023. This revealed metastatic  mammary carcinoma with neuroendocrine features, ER+ 95%, SD positive 85% and Her2 negative 1+. Results and treatment options were discussed with patient and her .     However, due to cough that was persistent a CT of the chest was ordered and completed on 2/16/2024. Unfortunately this showed further disease progression in liver and mediastinal LN     Her last echo was completed on 08/10/2023 which showed LVEF 60-65%     Allergies and Intolerances:       Allergies:         penicillin: Drug, Hives/Urticaria, Active     Outpatient Medication Profile:  *     Current Outpatient Medications:     amLODIPine (Norvasc) 5 mg tablet, Take 1 tablet (5 mg) by mouth once daily., Disp: , Rfl:     calcium carbonate-vitamin D3 1,000 mg-20 mcg (800 unit) tablet, Take by mouth 2 times a day., Disp: , Rfl:     chlorthalidone (Hygroton) 25 mg tablet, Take 1 tablet (25 mg) by mouth once daily., Disp: , Rfl:     cholecalciferol (Vitamin D-3) 25 MCG (1000 UT) capsule, Take 1 capsule (25 mcg) by mouth once daily., Disp: , Rfl:     hydroCHLOROthiazide (HYDRODiuril) 25 mg tablet, Take 1 tablet (25 mg) by mouth once daily., Disp: , Rfl:     levothyroxine (Synthroid, Levoxyl) 100 mcg tablet, Take 1 tablet (100 mcg) by mouth once daily in the morning. Take before meals., Disp: , Rfl:     magnesium oxide (Mag-Ox) 400 mg (241.3 mg magnesium) tablet, Take 1 tablet (400 mg) by mouth once daily., Disp: , Rfl:     multivit-min-iron-FA-vit K-lut (Centrum Silver Women) 8 mg iron-400 mcg-50 mcg tablet, Take 1 tablet by mouth once daily., Disp: , Rfl:     NON FORMULARY, Take 1 each by mouth 2 times a day.  Turmeric Curcumin Oral Capsule, Disp: , Rfl:     ondansetron (Zofran) 8 mg tablet, Take 1 tablet (8 mg) by mouth every 8 hours if needed., Disp: , Rfl:     PaxiL 10 mg tablet, Take 1 tablet (10 mg) by mouth once daily., Disp: , Rfl:     levothyroxine (Synthroid, Levoxyl) 88 mcg tablet, , Disp: , Rfl:     multivit-minerals/folic acid (CENTRUM  "ADULTS ORAL), Take 1 tablet by mouth once daily., Disp: , Rfl:     potassium chloride (Klor-Con) 20 mEq packet, Take 20 mEq by mouth once daily., Disp: , Rfl:     potassium chloride CR 20 mEq ER tablet, Take 2 tablets (40 mEq) by mouth twice a day., Disp: , Rfl:   No current facility-administered medications for this visit.      Medical History:         High risk medication use: ICD-10: Z79.899, Status: Active         Breast cancer: ICD-10: C50.919, Status: Active         Recurrent Clostridium difficile diarrhea: ICD-10: A04.7, Status:  Active         Hypokalemia: ICD-10: E87.6, Status: Active         Malignant neoplasm of breast: ICD-10: C50.919, Status: Active         Social History:   Social Substance History:  ·  Smoking Status never smoker (1)   ·  Additional History     Breast Cancer Risk Factors:  , Had her menarche at age 10 years,  menopause 52 , used BCP x 2 years but never used HRT     Family History   Mother had ovarian cancer - age 65- BRCA negative s/p b/l salpingo oopherectomy.  Father  had colon cancer- age 56,  at 59 of metastatic disease.     Social History    Never smoker , social alcohol use  Active , exercises regularly, administrative job.        Performance:   ECOG Performance Status: 0 Fully Active         Vitals and Measurements:   Visit Vitals  /84   Pulse 99   Temp 36.9 °C (98.4 °F) (Temporal)   Resp 20   Ht 1.626 m (5' 4.02\")   Wt 84.9 kg (187 lb 2.7 oz)   SpO2 94%   BMI 32.11 kg/m²   Smoking Status Never   BSA 1.96 m²            Physical Exam:      Constitutional: AAOx3. appears comfortable.   Eyes: B/l GAEL. EOMI   ENMT: No oral ulcers or thrush. No pharyngeal congestion.   Head/Neck: No thyromegaly/JVD.   Respiratory/Thorax: B/l Air entry equal. No added  sounds.   Cardiovascular: S1s2+. No murmur/rub/Gallop   Gastrointestinal: Soft, Non tender. No hepatosplenomegaly.  BS+.   Extremities: No pedal edema.   Neurological: No focal motor deficits.   Breast: B/l s/p " mastectomy, Lumpiness felt-stable  since last clinic visit.  No lymphedema   Lymphatic: No significant lymphadenopathy   Psychological: Appropriate mood and behavior   Skin: No rash/petechiae         Lab Results:     Lab Results   Component Value Date    WBC 3.5 (L) 02/21/2024    HGB 9.3 (L) 02/21/2024    HCT 28.0 (L) 02/21/2024    MCV 85 02/21/2024     (L) 02/21/2024         Lab Results   Component Value Date    NEUTROABS 2.91 02/21/2024              Chemistry    Lab Results   Component Value Date/Time     02/21/2024 2149    K 3.3 (L) 02/21/2024 2149    CL 98 02/21/2024 2149    CO2 27 02/21/2024 2149    BUN 16 02/21/2024 2149    CREATININE 0.71 02/21/2024 2149    Lab Results   Component Value Date/Time    CALCIUM 9.2 02/21/2024 2149    ALKPHOS 280 (H) 02/21/2024 2149     (H) 02/21/2024 2149    ALT 76 (H) 02/21/2024 2149    BILITOT 2.2 (H) 02/21/2024 2149                  Radiology Result:   CT chest w IV contrast  Status: Final result     PACS Images     Show images for CT chest w IV contrast  Signed by    Signed Time Phone Pager   Gunner Bess MD 2/16/2024 14:59 [phone]      Exam Information    Status Exam Begun Exam Ended   Final 2/16/2024 13:24 2/16/2024 13:41     Study Result    Narrative & Impression   STUDY:  CT Chest with IV Contrast; 2/16/24 at 1:46 PM  INDICATION:  Persistent cough.  History breast CA.   COMPARISON:  NM whole bony 12/15/23. CT CAP 12/15/23.  ACCESSION NUMBER(S):  [id number]  ORDERING CLINICIAN:  OTTONIEL POWERS  TECHNIQUE:  CT of the chest was performed with intravenous contrast.    Omnipaque 350 50 mL was administered intravenously.  Automated mA/kV exposure control was utilized and patient examination  was performed in strict accordance with principles of ALARA.  FINDINGS:  MEDIASTINUM:  The heart is enlarged.  No pericardial effusion.  Negative for  thoracic aortic dissection.  Cannot evaluate for pulmonary embolism  due to timing of the contrast bolus.   Mediastinal and hilar  lymphadenopathy.  Right upper paratracheal lymph node measures 2.1 x  1.5 cm and previously measures 1.8 x 1.6 cm.  Subcarinal  lymphadenopathy 3.9 x 2.8 cm and previously measures 4.0 x 2.2 cm.   Right chest port.  Postsurgical changes bilateral breasts.  LUNGS/PLEURA:  There is no pleural effusion, pleural thickening, or pneumothorax.   The airways are patent.  No regions of airspace consolidation.  Pulmonary metastasis.  0.8 cm  nodule apical segment left upper lobe unchanged.  0.9 cm nodule within  the lingula previously measures 1.3 cm and decreased.  Multiple  nodules left lower lobe unchanged.  A mass within the right lower lobe  currently measures 4.8 x 4.3 cm and previously measures 4.5 x 3.8 cm.   Mass within the medial aspect of the right upper lobe measures 4.1 x  2.3 cm and previously measures 4.3 x 2.2 cm.  LYMPH NODES:  See above  UPPER ABDOMEN:  Multiple hypodense lesions throughout the liver are increased in size  and number since prior exam.  A hypodense lesion within the right  hepatic lobe image 40 measures 1.8 x 1.4 cm previously measures 1.4 x  1.1 cm.  Fat density right adrenal mass 2.6 x 1.8 cm.  Splenomegaly  and calcified splenic granulomas.  BONES:  There are no acute fractures.  No suspicious bony lesions.  IMPRESSION:  No regions of airspace consolidation.  Progression of metastatic disease.  Increase in size and number of  liver metastasis.  Increase in mediastinal lymphadenopathy.  Pulmonary  metastasis.  A large mass within the right lower lobe has increased in  size.  Cardiomegaly.  Signed by Gunner Bess MD           Assessment and Plan:   Assessment:    63 year old post menopausal woman with stage IV breast cancer, invasive ductal carcinoma, ER/WA positive, HER 2 negative. s/p bilateral mastectomies in 01/2019.       Last set of restaging scans were completed on  12/15/2023. This showed disease progression in lung and liver.  As a result a liver biopsy was  completed on 12/26/2023. This revealed metastatic mammary carcinoma with neuroendocrine features, ER+ 95%, SD positive 85% and Her2 negative 1+. Results and treatment options were discussed with patient and her .      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 Patient was leaning going back on CDK4/6 inhibitors with Ribociclib given that she was stable on Ibrance and Letrozole for >5 years. No Clinical trial options due to abnormal LFTs from diffuse steatosis    Now on Ribociclib and Fulvesterant, today is C1D21.    However, due to cough that was persistent a CT of the chest was ordered and completed on 2/16/2024. Unfortunately this showed further disease progression in liver and mediastinal LN. Of note and concern is worsening liver enzymes above and beyond her baseline abnormal test results. Will obtain an MRCP to evaluate further. Will also switch to systemic chemotherapy as this likely represents a visceral crisis             Plan:          Patient is on Ribociclib plus Fulvesterant. Today is Cycle 1 D21.  MRCP STAT. Scheduled for tomorrow  Repeat LFTs tomorrow  First dose Taxol on 03/05/2024.   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

## 2024-02-21 ENCOUNTER — APPOINTMENT (OUTPATIENT)
Dept: RADIOLOGY | Facility: HOSPITAL | Age: 64
End: 2024-02-21
Payer: MEDICARE

## 2024-02-21 ENCOUNTER — NURSE TRIAGE (OUTPATIENT)
Dept: ADMISSION | Facility: HOSPITAL | Age: 64
End: 2024-02-21
Payer: MEDICARE

## 2024-02-21 ENCOUNTER — APPOINTMENT (OUTPATIENT)
Dept: CARDIOLOGY | Facility: HOSPITAL | Age: 64
DRG: 055 | End: 2024-02-21
Payer: MEDICARE

## 2024-02-21 ENCOUNTER — APPOINTMENT (OUTPATIENT)
Dept: RADIOLOGY | Facility: HOSPITAL | Age: 64
DRG: 055 | End: 2024-02-21
Payer: MEDICARE

## 2024-02-21 ENCOUNTER — HOSPITAL ENCOUNTER (INPATIENT)
Facility: HOSPITAL | Age: 64
LOS: 2 days | Discharge: HOME | DRG: 055 | End: 2024-02-24
Attending: EMERGENCY MEDICINE | Admitting: INTERNAL MEDICINE
Payer: MEDICARE

## 2024-02-21 DIAGNOSIS — G93.9 CEREBELLAR LESION: Primary | ICD-10-CM

## 2024-02-21 DIAGNOSIS — R42 VERTIGO: ICD-10-CM

## 2024-02-21 DIAGNOSIS — R11.2 NAUSEA AND VOMITING, UNSPECIFIED VOMITING TYPE: ICD-10-CM

## 2024-02-21 LAB
ALBUMIN SERPL BCP-MCNC: 4 G/DL (ref 3.4–5)
ALP SERPL-CCNC: 280 U/L (ref 33–136)
ALT SERPL W P-5'-P-CCNC: 76 U/L (ref 7–45)
ANION GAP SERPL CALC-SCNC: 15 MMOL/L (ref 10–20)
AST SERPL W P-5'-P-CCNC: 100 U/L (ref 9–39)
BASOPHILS # BLD AUTO: 0.03 X10*3/UL (ref 0–0.1)
BASOPHILS NFR BLD AUTO: 0.9 %
BILIRUB SERPL-MCNC: 2.2 MG/DL (ref 0–1.2)
BUN SERPL-MCNC: 16 MG/DL (ref 6–23)
CALCIUM SERPL-MCNC: 9.2 MG/DL (ref 8.6–10.3)
CARDIAC TROPONIN I PNL SERPL HS: 4 NG/L (ref 0–13)
CARDIAC TROPONIN I PNL SERPL HS: 4 NG/L (ref 0–13)
CHLORIDE SERPL-SCNC: 98 MMOL/L (ref 98–107)
CO2 SERPL-SCNC: 27 MMOL/L (ref 21–32)
CREAT SERPL-MCNC: 0.71 MG/DL (ref 0.5–1.05)
DACRYOCYTES BLD QL SMEAR: NORMAL
EGFRCR SERPLBLD CKD-EPI 2021: >90 ML/MIN/1.73M*2
EOSINOPHIL # BLD AUTO: 0 X10*3/UL (ref 0–0.7)
EOSINOPHIL NFR BLD AUTO: 0 %
ERYTHROCYTE [DISTWIDTH] IN BLOOD BY AUTOMATED COUNT: 26 % (ref 11.5–14.5)
GLUCOSE SERPL-MCNC: 192 MG/DL (ref 74–99)
HCT VFR BLD AUTO: 28 % (ref 36–46)
HGB BLD-MCNC: 9.3 G/DL (ref 12–16)
IMM GRANULOCYTES # BLD AUTO: 0.02 X10*3/UL (ref 0–0.7)
IMM GRANULOCYTES NFR BLD AUTO: 0.6 % (ref 0–0.9)
INR PPP: 1.1 (ref 0.9–1.1)
LYMPHOCYTES # BLD AUTO: 0.38 X10*3/UL (ref 1.2–4.8)
LYMPHOCYTES NFR BLD AUTO: 11 %
MAGNESIUM SERPL-MCNC: 1.9 MG/DL (ref 1.6–2.4)
MCH RBC QN AUTO: 28.3 PG (ref 26–34)
MCHC RBC AUTO-ENTMCNC: 33.2 G/DL (ref 32–36)
MCV RBC AUTO: 85 FL (ref 80–100)
MONOCYTES # BLD AUTO: 0.11 X10*3/UL (ref 0.1–1)
MONOCYTES NFR BLD AUTO: 3.2 %
NEUTROPHILS # BLD AUTO: 2.91 X10*3/UL (ref 1.2–7.7)
NEUTROPHILS NFR BLD AUTO: 84.3 %
NRBC BLD-RTO: 0 /100 WBCS (ref 0–0)
OVALOCYTES BLD QL SMEAR: NORMAL
PLATELET # BLD AUTO: 103 X10*3/UL (ref 150–450)
POLYCHROMASIA BLD QL SMEAR: NORMAL
POTASSIUM SERPL-SCNC: 3.3 MMOL/L (ref 3.5–5.3)
PROT SERPL-MCNC: 7.6 G/DL (ref 6.4–8.2)
PROTHROMBIN TIME: 12.3 SECONDS (ref 9.8–12.8)
RBC # BLD AUTO: 3.29 X10*6/UL (ref 4–5.2)
RBC MORPH BLD: NORMAL
SODIUM SERPL-SCNC: 137 MMOL/L (ref 136–145)
WBC # BLD AUTO: 3.5 X10*3/UL (ref 4.4–11.3)

## 2024-02-21 PROCEDURE — 96374 THER/PROPH/DIAG INJ IV PUSH: CPT

## 2024-02-21 PROCEDURE — 36415 COLL VENOUS BLD VENIPUNCTURE: CPT | Performed by: EMERGENCY MEDICINE

## 2024-02-21 PROCEDURE — 83735 ASSAY OF MAGNESIUM: CPT | Performed by: EMERGENCY MEDICINE

## 2024-02-21 PROCEDURE — 99285 EMERGENCY DEPT VISIT HI MDM: CPT | Mod: 25

## 2024-02-21 PROCEDURE — 93005 ELECTROCARDIOGRAM TRACING: CPT

## 2024-02-21 PROCEDURE — 85610 PROTHROMBIN TIME: CPT | Performed by: EMERGENCY MEDICINE

## 2024-02-21 PROCEDURE — 2500000001 HC RX 250 WO HCPCS SELF ADMINISTERED DRUGS (ALT 637 FOR MEDICARE OP): Performed by: EMERGENCY MEDICINE

## 2024-02-21 PROCEDURE — 85025 COMPLETE CBC W/AUTO DIFF WBC: CPT | Performed by: EMERGENCY MEDICINE

## 2024-02-21 PROCEDURE — 70496 CT ANGIOGRAPHY HEAD: CPT

## 2024-02-21 PROCEDURE — 2550000001 HC RX 255 CONTRASTS: Performed by: EMERGENCY MEDICINE

## 2024-02-21 PROCEDURE — 70498 CT ANGIOGRAPHY NECK: CPT | Performed by: RADIOLOGY

## 2024-02-21 PROCEDURE — 84484 ASSAY OF TROPONIN QUANT: CPT | Performed by: EMERGENCY MEDICINE

## 2024-02-21 PROCEDURE — 2500000004 HC RX 250 GENERAL PHARMACY W/ HCPCS (ALT 636 FOR OP/ED): Performed by: EMERGENCY MEDICINE

## 2024-02-21 PROCEDURE — 96361 HYDRATE IV INFUSION ADD-ON: CPT

## 2024-02-21 PROCEDURE — 70496 CT ANGIOGRAPHY HEAD: CPT | Performed by: RADIOLOGY

## 2024-02-21 PROCEDURE — 80053 COMPREHEN METABOLIC PANEL: CPT | Performed by: EMERGENCY MEDICINE

## 2024-02-21 RX ORDER — ONDANSETRON HYDROCHLORIDE 2 MG/ML
4 INJECTION, SOLUTION INTRAVENOUS ONCE
Status: COMPLETED | OUTPATIENT
Start: 2024-02-21 | End: 2024-02-21

## 2024-02-21 RX ORDER — MECLIZINE HYDROCHLORIDE 25 MG/1
25 TABLET ORAL ONCE
Status: COMPLETED | OUTPATIENT
Start: 2024-02-21 | End: 2024-02-21

## 2024-02-21 RX ADMIN — ONDANSETRON 4 MG: 2 INJECTION INTRAMUSCULAR; INTRAVENOUS at 21:33

## 2024-02-21 RX ADMIN — MECLIZINE HYDROCHLORIDE 25 MG: 25 TABLET ORAL at 21:33

## 2024-02-21 RX ADMIN — SODIUM CHLORIDE, POTASSIUM CHLORIDE, SODIUM LACTATE AND CALCIUM CHLORIDE 1000 ML: 600; 310; 30; 20 INJECTION, SOLUTION INTRAVENOUS at 21:33

## 2024-02-21 RX ADMIN — IOHEXOL 75 ML: 350 INJECTION, SOLUTION INTRAVENOUS at 22:41

## 2024-02-21 ASSESSMENT — COLUMBIA-SUICIDE SEVERITY RATING SCALE - C-SSRS
6. HAVE YOU EVER DONE ANYTHING, STARTED TO DO ANYTHING, OR PREPARED TO DO ANYTHING TO END YOUR LIFE?: NO
1. IN THE PAST MONTH, HAVE YOU WISHED YOU WERE DEAD OR WISHED YOU COULD GO TO SLEEP AND NOT WAKE UP?: NO
2. HAVE YOU ACTUALLY HAD ANY THOUGHTS OF KILLING YOURSELF?: NO

## 2024-02-21 NOTE — TELEPHONE ENCOUNTER
"This RN called Daniel back due to being paged for her having continued nausea and vomitting.     I spoke with Daniel who stated that that she is nauseated and dizzy, no longer vomiting however light/movement cause her to be dizzy and nauseated, the patient has a scan tomorrow and was given two 1mg ativan tablets to be used as needed for scan tomorrow, Daniel is wondering if she can take one tablet now to assist with this nausea and dizziness and then use the remaining tablet tomorrow.     Per the on-call fellow \"ok I guess that's fine to try ativan if still nausea and dizzy needs to go to ER\" I advised that if she doesn't feel better in the next 1-2 hours she needs to go to an ER, they are agreeable, discussed the need to r/o a cardiac event. PT and pt  agreeable, will try 1mg of ativan and if that is not successful in relieving her symptoms, will go to ER  "

## 2024-02-21 NOTE — TELEPHONE ENCOUNTER
"Pts spouse called the office she woke up today and had a sudden \"dizzy spell.\" He states that she also has intense nausea and is dry heaving. Patient has not tried to take any medication for the nausea at this time. Informed him the dizziness could also be due to the nausea. Patient urged to take zofran and call the office back tonight if the nausea and vertigo persists. She has been taking sips of pedialyte to help. He will call with an update.  "

## 2024-02-22 ENCOUNTER — HOSPITAL ENCOUNTER (INPATIENT)
Facility: HOSPITAL | Age: 64
End: 2024-02-22
Attending: STUDENT IN AN ORGANIZED HEALTH CARE EDUCATION/TRAINING PROGRAM | Admitting: STUDENT IN AN ORGANIZED HEALTH CARE EDUCATION/TRAINING PROGRAM
Payer: MEDICARE

## 2024-02-22 ENCOUNTER — APPOINTMENT (OUTPATIENT)
Dept: RADIOLOGY | Facility: HOSPITAL | Age: 64
DRG: 055 | End: 2024-02-22
Payer: MEDICARE

## 2024-02-22 ENCOUNTER — APPOINTMENT (OUTPATIENT)
Dept: RADIOLOGY | Facility: HOSPITAL | Age: 64
End: 2024-02-22
Payer: MEDICARE

## 2024-02-22 PROBLEM — R42 VERTIGO: Status: ACTIVE | Noted: 2024-02-22

## 2024-02-22 LAB
ANION GAP SERPL CALC-SCNC: 13 MMOL/L (ref 10–20)
APPEARANCE UR: CLEAR
BILIRUB UR STRIP.AUTO-MCNC: NEGATIVE MG/DL
BUN SERPL-MCNC: 14 MG/DL (ref 6–23)
CALCIUM SERPL-MCNC: 9.3 MG/DL (ref 8.6–10.3)
CHLORIDE SERPL-SCNC: 98 MMOL/L (ref 98–107)
CO2 SERPL-SCNC: 29 MMOL/L (ref 21–32)
COLOR UR: YELLOW
CREAT SERPL-MCNC: 0.64 MG/DL (ref 0.5–1.05)
EGFRCR SERPLBLD CKD-EPI 2021: >90 ML/MIN/1.73M*2
EJECTION FRACTION APICAL 4 CHAMBER: 71.8
EJECTION FRACTION: 72 %
ERYTHROCYTE [DISTWIDTH] IN BLOOD BY AUTOMATED COUNT: 26.3 % (ref 11.5–14.5)
GLOBAL LONGITUDINAL STRAIN: 23.3 %
GLUCOSE SERPL-MCNC: 163 MG/DL (ref 74–99)
GLUCOSE UR STRIP.AUTO-MCNC: NEGATIVE MG/DL
HCT VFR BLD AUTO: 26.5 % (ref 36–46)
HGB BLD-MCNC: 8.8 G/DL (ref 12–16)
KETONES UR STRIP.AUTO-MCNC: ABNORMAL MG/DL
LEUKOCYTE ESTERASE UR QL STRIP.AUTO: NEGATIVE
MCH RBC QN AUTO: 28.1 PG (ref 26–34)
MCHC RBC AUTO-ENTMCNC: 33.2 G/DL (ref 32–36)
MCV RBC AUTO: 85 FL (ref 80–100)
NITRITE UR QL STRIP.AUTO: NEGATIVE
NRBC BLD-RTO: 0 /100 WBCS (ref 0–0)
PH UR STRIP.AUTO: 6 [PH]
PLATELET # BLD AUTO: 88 X10*3/UL (ref 150–450)
POTASSIUM SERPL-SCNC: 3.3 MMOL/L (ref 3.5–5.3)
PROT UR STRIP.AUTO-MCNC: NEGATIVE MG/DL
RBC # BLD AUTO: 3.13 X10*6/UL (ref 4–5.2)
RBC # UR STRIP.AUTO: NEGATIVE /UL
RIGHT VENTRICLE PEAK SYSTOLIC PRESSURE: 32.7 MMHG
SODIUM SERPL-SCNC: 137 MMOL/L (ref 136–145)
SP GR UR STRIP.AUTO: 1.04
UROBILINOGEN UR STRIP.AUTO-MCNC: <2 MG/DL
WBC # BLD AUTO: 3 X10*3/UL (ref 4.4–11.3)

## 2024-02-22 PROCEDURE — 2500000004 HC RX 250 GENERAL PHARMACY W/ HCPCS (ALT 636 FOR OP/ED): Performed by: INTERNAL MEDICINE

## 2024-02-22 PROCEDURE — 2500000001 HC RX 250 WO HCPCS SELF ADMINISTERED DRUGS (ALT 637 FOR MEDICARE OP): Performed by: INTERNAL MEDICINE

## 2024-02-22 PROCEDURE — 82374 ASSAY BLOOD CARBON DIOXIDE: CPT | Performed by: INTERNAL MEDICINE

## 2024-02-22 PROCEDURE — C9113 INJ PANTOPRAZOLE SODIUM, VIA: HCPCS | Performed by: INTERNAL MEDICINE

## 2024-02-22 PROCEDURE — 2500000004 HC RX 250 GENERAL PHARMACY W/ HCPCS (ALT 636 FOR OP/ED): Performed by: EMERGENCY MEDICINE

## 2024-02-22 PROCEDURE — 85027 COMPLETE CBC AUTOMATED: CPT | Performed by: INTERNAL MEDICINE

## 2024-02-22 PROCEDURE — 99221 1ST HOSP IP/OBS SF/LOW 40: CPT | Performed by: NEUROLOGICAL SURGERY

## 2024-02-22 PROCEDURE — 81003 URINALYSIS AUTO W/O SCOPE: CPT | Performed by: EMERGENCY MEDICINE

## 2024-02-22 PROCEDURE — 1100000001 HC PRIVATE ROOM DAILY

## 2024-02-22 PROCEDURE — 36415 COLL VENOUS BLD VENIPUNCTURE: CPT | Performed by: INTERNAL MEDICINE

## 2024-02-22 PROCEDURE — 99222 1ST HOSP IP/OBS MODERATE 55: CPT | Performed by: INTERNAL MEDICINE

## 2024-02-22 RX ORDER — CODEINE PHOSPHATE AND GUAIFENESIN 10; 100 MG/5ML; MG/5ML
5 SOLUTION ORAL EVERY 6 HOURS PRN
Status: DISCONTINUED | OUTPATIENT
Start: 2024-02-22 | End: 2024-02-24 | Stop reason: HOSPADM

## 2024-02-22 RX ORDER — ACETAMINOPHEN 160 MG/5ML
650 SOLUTION ORAL EVERY 4 HOURS PRN
Status: DISCONTINUED | OUTPATIENT
Start: 2024-02-22 | End: 2024-02-24 | Stop reason: HOSPADM

## 2024-02-22 RX ORDER — LANOLIN ALCOHOL/MO/W.PET/CERES
400 CREAM (GRAM) TOPICAL DAILY
Status: DISCONTINUED | OUTPATIENT
Start: 2024-02-22 | End: 2024-02-24 | Stop reason: HOSPADM

## 2024-02-22 RX ORDER — TALC
3 POWDER (GRAM) TOPICAL DAILY
Status: DISCONTINUED | OUTPATIENT
Start: 2024-02-22 | End: 2024-02-24 | Stop reason: HOSPADM

## 2024-02-22 RX ORDER — PROCHLORPERAZINE 25 MG/1
25 SUPPOSITORY RECTAL EVERY 12 HOURS PRN
Status: DISCONTINUED | OUTPATIENT
Start: 2024-02-22 | End: 2024-02-22 | Stop reason: RX

## 2024-02-22 RX ORDER — ACETAMINOPHEN 325 MG/1
650 TABLET ORAL EVERY 4 HOURS PRN
Status: DISCONTINUED | OUTPATIENT
Start: 2024-02-22 | End: 2024-02-24 | Stop reason: HOSPADM

## 2024-02-22 RX ORDER — ONDANSETRON HYDROCHLORIDE 2 MG/ML
4 INJECTION, SOLUTION INTRAVENOUS EVERY 8 HOURS PRN
Status: DISCONTINUED | OUTPATIENT
Start: 2024-02-22 | End: 2024-02-24 | Stop reason: HOSPADM

## 2024-02-22 RX ORDER — POTASSIUM CHLORIDE 1.5 G/1.58G
20 POWDER, FOR SOLUTION ORAL ONCE
Status: COMPLETED | OUTPATIENT
Start: 2024-02-22 | End: 2024-02-22

## 2024-02-22 RX ORDER — PANTOPRAZOLE SODIUM 40 MG/1
40 TABLET, DELAYED RELEASE ORAL
Status: DISCONTINUED | OUTPATIENT
Start: 2024-02-22 | End: 2024-02-24 | Stop reason: HOSPADM

## 2024-02-22 RX ORDER — CHOLECALCIFEROL (VITAMIN D3) 25 MCG
1000 TABLET ORAL DAILY
Status: DISCONTINUED | OUTPATIENT
Start: 2024-02-22 | End: 2024-02-24 | Stop reason: HOSPADM

## 2024-02-22 RX ORDER — CHLORTHALIDONE 25 MG/1
25 TABLET ORAL DAILY
Status: DISCONTINUED | OUTPATIENT
Start: 2024-02-22 | End: 2024-02-24 | Stop reason: HOSPADM

## 2024-02-22 RX ORDER — PROCHLORPERAZINE MALEATE 10 MG
10 TABLET ORAL EVERY 6 HOURS PRN
Status: DISCONTINUED | OUTPATIENT
Start: 2024-02-22 | End: 2024-02-24 | Stop reason: HOSPADM

## 2024-02-22 RX ORDER — AMLODIPINE BESYLATE 5 MG/1
5 TABLET ORAL DAILY
Status: DISCONTINUED | OUTPATIENT
Start: 2024-02-22 | End: 2024-02-24 | Stop reason: HOSPADM

## 2024-02-22 RX ORDER — SODIUM CHLORIDE 9 MG/ML
100 INJECTION, SOLUTION INTRAVENOUS CONTINUOUS
Status: DISCONTINUED | OUTPATIENT
Start: 2024-02-22 | End: 2024-02-24

## 2024-02-22 RX ORDER — FERROUS SULFATE, DRIED 160(50) MG
1 TABLET, EXTENDED RELEASE ORAL 2 TIMES DAILY
Status: DISCONTINUED | OUTPATIENT
Start: 2024-02-22 | End: 2024-02-24 | Stop reason: HOSPADM

## 2024-02-22 RX ORDER — DEXAMETHASONE SODIUM PHOSPHATE 10 MG/ML
10 INJECTION INTRAMUSCULAR; INTRAVENOUS ONCE
Status: COMPLETED | OUTPATIENT
Start: 2024-02-22 | End: 2024-02-22

## 2024-02-22 RX ORDER — LORAZEPAM 1 MG/1
1 TABLET ORAL EVERY 8 HOURS PRN
Status: DISCONTINUED | OUTPATIENT
Start: 2024-02-22 | End: 2024-02-24 | Stop reason: HOSPADM

## 2024-02-22 RX ORDER — ENOXAPARIN SODIUM 100 MG/ML
40 INJECTION SUBCUTANEOUS EVERY 24 HOURS
Status: DISCONTINUED | OUTPATIENT
Start: 2024-02-22 | End: 2024-02-24 | Stop reason: HOSPADM

## 2024-02-22 RX ORDER — LORAZEPAM 1 MG/1
1 TABLET ORAL ONCE
Status: COMPLETED | OUTPATIENT
Start: 2024-02-22 | End: 2024-02-22

## 2024-02-22 RX ORDER — ONDANSETRON HYDROCHLORIDE 2 MG/ML
4 INJECTION, SOLUTION INTRAVENOUS ONCE
Status: COMPLETED | OUTPATIENT
Start: 2024-02-22 | End: 2024-02-22

## 2024-02-22 RX ORDER — POTASSIUM CHLORIDE 1.5 G/1.58G
20 POWDER, FOR SOLUTION ORAL DAILY
Status: DISCONTINUED | OUTPATIENT
Start: 2024-02-22 | End: 2024-02-24 | Stop reason: HOSPADM

## 2024-02-22 RX ORDER — PROCHLORPERAZINE EDISYLATE 5 MG/ML
10 INJECTION INTRAMUSCULAR; INTRAVENOUS EVERY 6 HOURS PRN
Status: DISCONTINUED | OUTPATIENT
Start: 2024-02-22 | End: 2024-02-24 | Stop reason: HOSPADM

## 2024-02-22 RX ORDER — ACETAMINOPHEN 650 MG/1
650 SUPPOSITORY RECTAL EVERY 4 HOURS PRN
Status: DISCONTINUED | OUTPATIENT
Start: 2024-02-22 | End: 2024-02-24 | Stop reason: HOSPADM

## 2024-02-22 RX ORDER — DEXAMETHASONE 4 MG/1
4 TABLET ORAL EVERY 6 HOURS SCHEDULED
Status: DISCONTINUED | OUTPATIENT
Start: 2024-02-22 | End: 2024-02-24 | Stop reason: HOSPADM

## 2024-02-22 RX ORDER — ONDANSETRON 4 MG/1
4 TABLET, FILM COATED ORAL EVERY 8 HOURS PRN
Status: DISCONTINUED | OUTPATIENT
Start: 2024-02-22 | End: 2024-02-24 | Stop reason: HOSPADM

## 2024-02-22 RX ORDER — LEVOTHYROXINE SODIUM 100 UG/1
100 TABLET ORAL
Status: DISCONTINUED | OUTPATIENT
Start: 2024-02-22 | End: 2024-02-24 | Stop reason: HOSPADM

## 2024-02-22 RX ORDER — PANTOPRAZOLE SODIUM 40 MG/10ML
40 INJECTION, POWDER, LYOPHILIZED, FOR SOLUTION INTRAVENOUS
Status: DISCONTINUED | OUTPATIENT
Start: 2024-02-22 | End: 2024-02-24 | Stop reason: HOSPADM

## 2024-02-22 RX ORDER — PAROXETINE 10 MG/1
10 TABLET, FILM COATED ORAL DAILY
Status: DISCONTINUED | OUTPATIENT
Start: 2024-02-22 | End: 2024-02-24 | Stop reason: HOSPADM

## 2024-02-22 RX ADMIN — Medication 1000 UNITS: at 09:58

## 2024-02-22 RX ADMIN — DEXAMETHASONE 4 MG: 4 TABLET ORAL at 18:05

## 2024-02-22 RX ADMIN — PANTOPRAZOLE SODIUM 40 MG: 40 INJECTION, POWDER, FOR SOLUTION INTRAVENOUS at 06:39

## 2024-02-22 RX ADMIN — DEXAMETHASONE 4 MG: 4 TABLET ORAL at 12:17

## 2024-02-22 RX ADMIN — LEVOTHYROXINE SODIUM 100 MCG: 100 TABLET ORAL at 06:39

## 2024-02-22 RX ADMIN — SODIUM CHLORIDE 100 ML/HR: 9 INJECTION, SOLUTION INTRAVENOUS at 22:55

## 2024-02-22 RX ADMIN — DEXAMETHASONE 4 MG: 4 TABLET ORAL at 05:27

## 2024-02-22 RX ADMIN — PROCHLORPERAZINE EDISYLATE 10 MG: 5 INJECTION INTRAMUSCULAR; INTRAVENOUS at 05:27

## 2024-02-22 RX ADMIN — POTASSIUM CHLORIDE 20 MEQ: 1.5 POWDER, FOR SOLUTION ORAL at 10:00

## 2024-02-22 RX ADMIN — Medication 1 TABLET: at 09:58

## 2024-02-22 RX ADMIN — AMLODIPINE BESYLATE 5 MG: 5 TABLET ORAL at 09:59

## 2024-02-22 RX ADMIN — ENOXAPARIN SODIUM 40 MG: 40 INJECTION SUBCUTANEOUS at 05:28

## 2024-02-22 RX ADMIN — SODIUM CHLORIDE 100 ML/HR: 9 INJECTION, SOLUTION INTRAVENOUS at 05:29

## 2024-02-22 RX ADMIN — Medication 1 TABLET: at 21:45

## 2024-02-22 RX ADMIN — Medication 400 MG: at 09:58

## 2024-02-22 RX ADMIN — LORAZEPAM 1 MG: 1 TABLET ORAL at 10:05

## 2024-02-22 RX ADMIN — DEXAMETHASONE SODIUM PHOSPHATE 10 MG: 10 INJECTION INTRAMUSCULAR; INTRAVENOUS at 02:21

## 2024-02-22 RX ADMIN — POTASSIUM CHLORIDE 20 MEQ: 1.5 POWDER, FOR SOLUTION ORAL at 09:59

## 2024-02-22 RX ADMIN — ONDANSETRON 4 MG: 2 INJECTION INTRAMUSCULAR; INTRAVENOUS at 02:21

## 2024-02-22 RX ADMIN — Medication 3 MG: at 21:45

## 2024-02-22 RX ADMIN — CHLORTHALIDONE 25 MG: 25 TABLET ORAL at 09:58

## 2024-02-22 RX ADMIN — LORAZEPAM 1 MG: 1 TABLET ORAL at 21:45

## 2024-02-22 RX ADMIN — PAROXETINE 10 MG: 10 TABLET, FILM COATED ORAL at 09:58

## 2024-02-22 SDOH — SOCIAL STABILITY: SOCIAL INSECURITY: WERE YOU ABLE TO COMPLETE ALL THE BEHAVIORAL HEALTH SCREENINGS?: YES

## 2024-02-22 SDOH — SOCIAL STABILITY: SOCIAL INSECURITY: HAVE YOU HAD THOUGHTS OF HARMING ANYONE ELSE?: NO

## 2024-02-22 SDOH — SOCIAL STABILITY: SOCIAL INSECURITY: ARE THERE ANY APPARENT SIGNS OF INJURIES/BEHAVIORS THAT COULD BE RELATED TO ABUSE/NEGLECT?: NO

## 2024-02-22 SDOH — SOCIAL STABILITY: SOCIAL INSECURITY: HAS ANYONE EVER THREATENED TO HURT YOUR FAMILY OR YOUR PETS?: NO

## 2024-02-22 SDOH — SOCIAL STABILITY: SOCIAL INSECURITY: DOES ANYONE TRY TO KEEP YOU FROM HAVING/CONTACTING OTHER FRIENDS OR DOING THINGS OUTSIDE YOUR HOME?: NO

## 2024-02-22 SDOH — SOCIAL STABILITY: SOCIAL INSECURITY: DO YOU FEEL ANYONE HAS EXPLOITED OR TAKEN ADVANTAGE OF YOU FINANCIALLY OR OF YOUR PERSONAL PROPERTY?: NO

## 2024-02-22 SDOH — SOCIAL STABILITY: SOCIAL INSECURITY: ARE YOU OR HAVE YOU BEEN THREATENED OR ABUSED PHYSICALLY, EMOTIONALLY, OR SEXUALLY BY ANYONE?: NO

## 2024-02-22 SDOH — SOCIAL STABILITY: SOCIAL INSECURITY: DO YOU FEEL UNSAFE GOING BACK TO THE PLACE WHERE YOU ARE LIVING?: NO

## 2024-02-22 SDOH — SOCIAL STABILITY: SOCIAL INSECURITY: ABUSE: ADULT

## 2024-02-22 ASSESSMENT — PATIENT HEALTH QUESTIONNAIRE - PHQ9
2. FEELING DOWN, DEPRESSED OR HOPELESS: NOT AT ALL
SUM OF ALL RESPONSES TO PHQ9 QUESTIONS 1 & 2: 0
1. LITTLE INTEREST OR PLEASURE IN DOING THINGS: NOT AT ALL

## 2024-02-22 ASSESSMENT — COGNITIVE AND FUNCTIONAL STATUS - GENERAL
DRESSING REGULAR UPPER BODY CLOTHING: A LITTLE
TOILETING: A LITTLE
MOVING TO AND FROM BED TO CHAIR: A LITTLE
DAILY ACTIVITIY SCORE: 20
DRESSING REGULAR LOWER BODY CLOTHING: A LITTLE
PATIENT BASELINE BEDBOUND: NO
DAILY ACTIVITIY SCORE: 18
WALKING IN HOSPITAL ROOM: A LITTLE
TOILETING: A LITTLE
PERSONAL GROOMING: A LITTLE
CLIMB 3 TO 5 STEPS WITH RAILING: A LITTLE
EATING MEALS: A LITTLE
TURNING FROM BACK TO SIDE WHILE IN FLAT BAD: A LITTLE
TOILETING: A LITTLE
MOBILITY SCORE: 21
MOVING FROM LYING ON BACK TO SITTING ON SIDE OF FLAT BED WITH BEDRAILS: A LITTLE
STANDING UP FROM CHAIR USING ARMS: A LITTLE
MOBILITY SCORE: 18
WALKING IN HOSPITAL ROOM: A LITTLE
DRESSING REGULAR UPPER BODY CLOTHING: A LITTLE
MOVING TO AND FROM BED TO CHAIR: A LITTLE
WALKING IN HOSPITAL ROOM: A LITTLE
HELP NEEDED FOR BATHING: A LITTLE
DAILY ACTIVITIY SCORE: 19
CLIMB 3 TO 5 STEPS WITH RAILING: A LITTLE
CLIMB 3 TO 5 STEPS WITH RAILING: A LITTLE
PERSONAL GROOMING: A LITTLE
TOILETING: A LITTLE
DAILY ACTIVITIY SCORE: 18
DRESSING REGULAR UPPER BODY CLOTHING: A LITTLE
HELP NEEDED FOR BATHING: A LITTLE
WALKING IN HOSPITAL ROOM: A LITTLE
STANDING UP FROM CHAIR USING ARMS: A LITTLE
DRESSING REGULAR LOWER BODY CLOTHING: A LITTLE
STANDING UP FROM CHAIR USING ARMS: A LITTLE
HELP NEEDED FOR BATHING: A LITTLE
CLIMB 3 TO 5 STEPS WITH RAILING: A LITTLE
STANDING UP FROM CHAIR USING ARMS: A LITTLE
TURNING FROM BACK TO SIDE WHILE IN FLAT BAD: A LITTLE
DRESSING REGULAR UPPER BODY CLOTHING: A LITTLE
DRESSING REGULAR LOWER BODY CLOTHING: A LITTLE
MOBILITY SCORE: 18
EATING MEALS: A LITTLE
MOVING FROM LYING ON BACK TO SITTING ON SIDE OF FLAT BED WITH BEDRAILS: A LITTLE
MOBILITY SCORE: 21
HELP NEEDED FOR BATHING: A LITTLE
PERSONAL GROOMING: A LITTLE
DRESSING REGULAR LOWER BODY CLOTHING: A LITTLE

## 2024-02-22 ASSESSMENT — ACTIVITIES OF DAILY LIVING (ADL)
ADEQUATE_TO_COMPLETE_ADL: YES
WALKS IN HOME: NEEDS ASSISTANCE
HEARING - RIGHT EAR: FUNCTIONAL
TOILETING: INDEPENDENT
BATHING: INDEPENDENT
LACK_OF_TRANSPORTATION: NO
GROOMING: INDEPENDENT
DRESSING YOURSELF: INDEPENDENT
FEEDING YOURSELF: INDEPENDENT
JUDGMENT_ADEQUATE_SAFELY_COMPLETE_DAILY_ACTIVITIES: YES
HEARING - LEFT EAR: FUNCTIONAL
PATIENT'S MEMORY ADEQUATE TO SAFELY COMPLETE DAILY ACTIVITIES?: YES

## 2024-02-22 ASSESSMENT — ENCOUNTER SYMPTOMS
VOMITING: 1
APPETITE CHANGE: 1
ALLERGIC/IMMUNOLOGIC NEGATIVE: 1
HEMATOLOGIC/LYMPHATIC NEGATIVE: 1
MUSCULOSKELETAL NEGATIVE: 1
RESPIRATORY NEGATIVE: 1
CARDIOVASCULAR NEGATIVE: 1
ENDOCRINE NEGATIVE: 1
PSYCHIATRIC NEGATIVE: 1
NAUSEA: 1
EYES NEGATIVE: 1
LIGHT-HEADEDNESS: 1
DIZZINESS: 1

## 2024-02-22 ASSESSMENT — LIFESTYLE VARIABLES
AUDIT-C TOTAL SCORE: 0
SKIP TO QUESTIONS 9-10: 1
HOW MANY STANDARD DRINKS CONTAINING ALCOHOL DO YOU HAVE ON A TYPICAL DAY: PATIENT DOES NOT DRINK
AUDIT-C TOTAL SCORE: 0
HOW OFTEN DO YOU HAVE A DRINK CONTAINING ALCOHOL: NEVER
HOW OFTEN DO YOU HAVE 6 OR MORE DRINKS ON ONE OCCASION: NEVER

## 2024-02-22 ASSESSMENT — PAIN SCALES - GENERAL
PAINLEVEL_OUTOF10: 0 - NO PAIN
PAINLEVEL_OUTOF10: 0 - NO PAIN

## 2024-02-22 ASSESSMENT — PAIN - FUNCTIONAL ASSESSMENT
PAIN_FUNCTIONAL_ASSESSMENT: 0-10
PAIN_FUNCTIONAL_ASSESSMENT: 0-10

## 2024-02-22 NOTE — CARE PLAN
The patient's goals for the shift include      The clinical goals for the shift include nausea control    Problem: Nutrition  Goal: Less than 5 days NPO/clear liquids  Outcome: Progressing  Goal: Oral intake greater than 50%  Outcome: Progressing  Goal: Oral intake greater 75%  Outcome: Progressing  Goal: Consume prescribed supplement  Outcome: Progressing  Goal: Adequate PO fluid intake  Outcome: Progressing  Goal: Nutrition support goals are met within 48 hrs  Outcome: Progressing  Goal: Nutrition support is meeting 75% of nutrient needs  Outcome: Progressing  Goal: Tube feed tolerance  Outcome: Progressing  Goal: BG  mg/dL  Outcome: Progressing  Goal: Lab values WNL  Outcome: Progressing  Goal: Electrolytes WNL  Outcome: Progressing  Goal: Promote healing  Outcome: Progressing  Goal: Maintain stable weight  Outcome: Progressing  Goal: Reduce weight from edema/fluid  Outcome: Progressing  Goal: Gradual weight gain  Outcome: Progressing  Goal: Improve ostomy output  Outcome: Progressing

## 2024-02-22 NOTE — H&P
History Of Present Illness  Chio Turner is a 63 y.o. 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  She felt well when she woke up in the morning took her medications and then began feeling lightheaded and unsteady on her feet.  She was concerned that she was having vertigo so she attempted vestibular Exercises but her symptoms did not improve.  She then developed nausea with recurrent emesis.  She tried Zofran without improvement. She called her oncologist who suggested an Ativan.  She had no abdominal pain hematemesis coffee-ground emesis or diarrhea.  She had no headache or visual changes or focal weakness or numbness.  No double vision chest pain shortness of breath diaphoresis or urinary symptoms she describes vertigo and feels like the room was spinning but nothing really improves her symptoms so she came to the ER     Past Medical History  Past Medical History:   Diagnosis Date    Other specified health status 06/16/2015    No pertinent past medical history        Surgical History  Past Surgical History:   Procedure Laterality Date    CT GUIDED PERCUTANEOUS BIOPSY LUNG  7/1/2015    CT GUIDED PERCUTANEOUS BIOPSY LUNG 7/1/2015 [facility] [facility]    HERNIA REPAIR  06/16/2015    Hernia Repair    OTHER SURGICAL HISTORY  01/23/2019    Breast reconstruction    OTHER SURGICAL HISTORY  01/24/2019    Mastectomy bilateral    TONSILLECTOMY  06/16/2015    Tonsillectomy    US GUIDED NEEDLE LIVER BIOPSY  12/26/2023    US GUIDED NEEDLE LIVER BIOPSY 12/26/2023 Torie Rose [place] [facility] [place]         Social History  She reports that she has never smoked. She has never been exposed to tobacco smoke. She has never used smokeless tobacco. No history on file for alcohol use and drug use.    Family History  Family History   Problem  "Relation Name Age of Onset    Ovarian cancer Mother      Colon cancer Father          Allergies  Penicillins    Review of Systems   Constitutional:  Positive for appetite change.   HENT: Negative.     Eyes: Negative.    Respiratory: Negative.     Cardiovascular: Negative.    Gastrointestinal:  Positive for nausea and vomiting.   Endocrine: Negative.    Genitourinary: Negative.    Musculoskeletal: Negative.    Skin: Negative.    Allergic/Immunologic: Negative.    Neurological:  Positive for dizziness and light-headedness.   Hematological: Negative.    Psychiatric/Behavioral: Negative.     All other systems reviewed and are negative.       Physical Exam  Vitals and nursing note reviewed.   Constitutional:       Appearance: Normal appearance.   HENT:      Head: Normocephalic.      Right Ear: External ear normal.      Left Ear: External ear normal.      Nose: Nose normal.      Mouth/Throat:      Mouth: Mucous membranes are dry.      Pharynx: Oropharynx is clear.   Eyes:      Extraocular Movements: Extraocular movements intact.      Conjunctiva/sclera: Conjunctivae normal.      Pupils: Pupils are equal, round, and reactive to light.   Cardiovascular:      Rate and Rhythm: Normal rate and regular rhythm.   Pulmonary:      Effort: Pulmonary effort is normal.      Breath sounds: Normal breath sounds.   Abdominal:      General: Abdomen is flat. Bowel sounds are normal.      Palpations: Abdomen is soft.   Musculoskeletal:         General: Normal range of motion.   Skin:     General: Skin is warm and dry.   Neurological:      General: No focal deficit present.      Mental Status: She is alert. Mental status is at baseline.   Psychiatric:         Mood and Affect: Mood normal.         Behavior: Behavior normal.          Last Recorded Vitals  Blood pressure 122/72, pulse 71, temperature 35.7 °C (96.3 °F), temperature source Oral, resp. rate 20, height 1.626 m (5' 4\"), weight 84.8 kg (187 lb), SpO2 (!) 91 %.    Relevant " Results  Meds:  Scheduled medications  dexAMETHasone, 4 mg, oral, q6h JAGDISH      Continuous medications     PRN medications     Current Outpatient Medications   Medication Instructions    amLODIPine (Norvasc) 5 mg tablet 1 tablet, oral, Daily    calcium carbonate-vitamin D3 1,000 mg-20 mcg (800 unit) tablet oral, 2 times daily    chlorthalidone (Hygroton) 25 mg tablet 1 tablet, oral, Daily    cholecalciferol (Vitamin D-3) 25 MCG (1000 UT) capsule 1 capsule, oral, Daily    hydroCHLOROthiazide (HYDRODiuril) 25 mg tablet 1 tablet, oral, Daily    levothyroxine (SYNTHROID, LEVOXYL) 100 mcg, oral, Daily before breakfast    LORazepam (ATIVAN) 1 mg, oral, See admin instructions, Take 1 tablet 30 mins before MRCP. May repeat x once    magnesium oxide (Mag-Ox) 400 mg (241.3 mg magnesium) tablet 1 tablet, oral, Daily    multivit-min-iron-FA-vit K-lut (Centrum Silver Women) 8 mg iron-400 mcg-50 mcg tablet 1 tablet, oral, Daily    multivit-minerals/folic acid (CENTRUM ADULTS ORAL) 1 tablet, oral, Daily    NON FORMULARY 1 capsule, oral, 2 times daily,  Turmeric Curcumin Oral Capsule    ondansetron (Zofran) 8 mg tablet 1 tablet, oral, Every 8 hours PRN    PaxiL 10 mg tablet 1 tablet, oral, Daily    potassium chloride (Klor-Con) 20 mEq packet 1 packet, oral, Daily    potassium chloride CR 20 mEq ER tablet 2 tablets, oral, 2 times daily    ribociclib (Kisqali) 3 x 200 mg tablet therapy pack Take 3 tablets (600 mg total) by mouth in the morning for 21 days on, followed by 7 days off per 28-day treatment cycle. Repeat cycle every 28 days.        Labs:  Results for orders placed or performed during the hospital encounter of 02/21/24 (from the past 24 hour(s))   CBC and Auto Differential   Result Value Ref Range    WBC 3.5 (L) 4.4 - 11.3 x10*3/uL    nRBC 0.0 0.0 - 0.0 /100 WBCs    RBC 3.29 (L) 4.00 - 5.20 x10*6/uL    Hemoglobin 9.3 (L) 12.0 - 16.0 g/dL    Hematocrit 28.0 (L) 36.0 - 46.0 %    MCV 85 80 - 100 fL    MCH 28.3 26.0 - 34.0 pg     MCHC 33.2 32.0 - 36.0 g/dL    RDW 26.0 (H) 11.5 - 14.5 %    Platelets 103 (L) 150 - 450 x10*3/uL    Neutrophils % 84.3 40.0 - 80.0 %    Immature Granulocytes %, Automated 0.6 0.0 - 0.9 %    Lymphocytes % 11.0 13.0 - 44.0 %    Monocytes % 3.2 2.0 - 10.0 %    Eosinophils % 0.0 0.0 - 6.0 %    Basophils % 0.9 0.0 - 2.0 %    Neutrophils Absolute 2.91 1.20 - 7.70 x10*3/uL    Immature Granulocytes Absolute, Automated 0.02 0.00 - 0.70 x10*3/uL    Lymphocytes Absolute 0.38 (L) 1.20 - 4.80 x10*3/uL    Monocytes Absolute 0.11 0.10 - 1.00 x10*3/uL    Eosinophils Absolute 0.00 0.00 - 0.70 x10*3/uL    Basophils Absolute 0.03 0.00 - 0.10 x10*3/uL   Comprehensive metabolic panel   Result Value Ref Range    Glucose 192 (H) 74 - 99 mg/dL    Sodium 137 136 - 145 mmol/L    Potassium 3.3 (L) 3.5 - 5.3 mmol/L    Chloride 98 98 - 107 mmol/L    Bicarbonate 27 21 - 32 mmol/L    Anion Gap 15 10 - 20 mmol/L    Urea Nitrogen 16 6 - 23 mg/dL    Creatinine 0.71 0.50 - 1.05 mg/dL    eGFR >90 >60 mL/min/1.73m*2    Calcium 9.2 8.6 - 10.3 mg/dL    Albumin 4.0 3.4 - 5.0 g/dL    Alkaline Phosphatase 280 (H) 33 - 136 U/L    Total Protein 7.6 6.4 - 8.2 g/dL     (H) 9 - 39 U/L    Bilirubin, Total 2.2 (H) 0.0 - 1.2 mg/dL    ALT 76 (H) 7 - 45 U/L   Magnesium   Result Value Ref Range    Magnesium 1.90 1.60 - 2.40 mg/dL   Protime-INR   Result Value Ref Range    Protime 12.3 9.8 - 12.8 seconds    INR 1.1 0.9 - 1.1   Troponin I, High Sensitivity   Result Value Ref Range    Troponin I, High Sensitivity 4 0 - 13 ng/L   Morphology   Result Value Ref Range    RBC Morphology See Below     Polychromasia Mild     Ovalocytes Few     Teardrop Cells Few    Troponin I, High Sensitivity   Result Value Ref Range    Troponin I, High Sensitivity 4 0 - 13 ng/L   Urinalysis with Reflex Culture and Microscopic   Result Value Ref Range    Color, Urine Yellow Straw, Yellow    Appearance, Urine Clear Clear    Specific Gravity, Urine 1.044 (N) 1.005 - 1.035    pH, Urine  6.0 5.0, 5.5, 6.0, 6.5, 7.0, 7.5, 8.0    Protein, Urine NEGATIVE NEGATIVE mg/dL    Glucose, Urine NEGATIVE NEGATIVE mg/dL    Blood, Urine NEGATIVE NEGATIVE    Ketones, Urine 5 (TRACE) (A) NEGATIVE mg/dL    Bilirubin, Urine NEGATIVE NEGATIVE    Urobilinogen, Urine <2.0 <2.0 mg/dL    Nitrite, Urine NEGATIVE NEGATIVE    Leukocyte Esterase, Urine NEGATIVE NEGATIVE      Imaging:  CT angio head and neck w and wo IV contrast    Result Date: 2/21/2024  Interpreted By:  Kerry Burgos, STUDY: CT ANGIO HEAD AND NECK W AND WO IV CONTRAST;  2/21/2024 10:59 pm   INDICATION: Signs/Symptoms:Atypical dizziness, history of [place] breast cancer   COMPARISON: Chest CT 02/16/2024   ACCESSION NUMBER(S): [id number]   ORDERING CLINICIAN: KOREY MICHAELS   TECHNIQUE: Multiple contiguous low-dose axial noncontrast images of the head were obtained. Following IV contrast administration, a CT angiography of the head  and neck was performed.  Triplanar MIPS  and 3D reconstructions of the [place]  and neck were generated.   FINDINGS: LOW-DOSE NON-CONTRAST HEAD CT:   There is a hyperdense mass with mild surrounding hypodensity suggesting edema in the cerebellar midline/vermis measuring 10 x 14 mm. Additional 6 mm similar density mass is noted in the right superior cerebellar hemisphere. No mass effect or midline shift. Questionable subcentimeter mass involving the right lentiform nucleus. No evidence of acute intracranial hemorrhage, large vessel ischemic infarct or extra-axial fluid collection. Gray-white matter distinction is preserved.   No effacement of the basal cisterns.   The brain parenchymal volume and ventricles are age-appropriate. No hydrocephalus.   No acute skull fracture. The visualized orbits  are intact. The visualized paranasal sinuses show  no significant abnormality. The mastoids are clear.     CTA NECK:   The great vessel origins are patent with no significant stenosis.   LEFT VERTEBRAL ARTERY:  No hemodynamically  significant stenosis, occlusion, or dissection.   LEFT COMMON/INTERNAL CAROTID ARTERY: Mild calcification of the carotid bifurcation. No hemodynamically significant stenosis, occlusion, or dissection.   RIGHT VERTEBRAL ARTERY:  No hemodynamically significant stenosis, occlusion, or dissection.   RIGHT COMMON/INTERNAL CAROTID ARTERY: Calcification of the carotid bifurcation with approximately 10% stenosis. No hemodynamically significant stenosis, occlusion, or dissection.     The neck soft tissues show no evidence of mass, fluid collection, or enlarged lymph nodes.   There is no acute osseous abnormality. Right upper lobe mass and several scattered bilateral apical nodules are stable. Grossly stable soft tissue mass encasing the right mainstem bronchus extending to the subcarinal region. Stable right pretracheal lymph nodes measuring up to 1.6 cm in short axis.     CTA HEAD:   ANTERIOR CIRCULATION: No hemodynamically significant intracranial stenosis, occlusion, or aneurysm.   - Internal Carotid Arteries: Patent with no hemodynamically significant stenosis.   - Middle Cerebral Arteries: Patent with no hemodynamically significant stenosis.   - Anterior Cerebral Arteries: Patent with no hemodynamically significant stenosis.     POSTERIOR CIRCULATION: No hemodynamically significant intracranial stenosis, occlusion, or aneurysm.   - Intracranial Vertebral Arteries: Patent with no hemodynamically significant stenosis.   - Basilar Artery: Patent with no hemodynamically significant stenosis.   - Posterior Cerebral Arteries: Patent with no hemodynamically significant stenosis.   No arteriovenous malformation is visualized. No pathologic intracranial enhancement or discrete mass. The dural venous sinuses are patent.   MIPS and 3D reconstructions confirm the above findings.       1. 2 hyperdense cerebellar masses, the largest measures 10 x 14 mm, most consistent with metastatic disease. The larger lesion is located in the  cerebellar vermis and associated with mild edema. No significant mass effect. Questionable small mass involving the right lentiform nucleus. MRI brain with contrast is more sensitive evaluation for metastatic disease and may be considered.   2. No significant stenosis of the cervical carotid or vertebral arteries.   3. No intracranial major branch occlusion or hemodynamically significant stenosis.   4. Partially imaged pulmonary/intrathoracic metastatic disease.   MACRO: None   Signed by: Kerry Burgos 2/21/2024 11:41 PM Dictation workstation:   [id number]       Assessment/Plan   63-year-old female with stage IV right breast ductal carcinoma with known metastatic disease to the lung and liver and now with new lesions in the brain.  2 hypertension cerebellar masses the largest 10 x 14 mm most consistent with metastatic disease to larger lesion is located in the cerebellar vermis and associated with mild edema with feeling lightheaded dizzy off balance nausea and vomiting  Plan:  She was given 10 mg of IV Decadron in the ER  We will continue Decadron 4 mg orally every 4 hours  Neurosurgical consultation  Tylenol as needed  Antivert 25 mg every 6 hours as needed    Hypothyroidism  Plan:  Continue levothyroxine 100 mcg orally daily    Hypertension  Plan:  Continue amlodipine 5 mg orally daily  Chlorthalidone 25 mg orally daily  Hold HCTZ for now    Anxiety  Plan:  Continue Paxil and Atarax    DVT prophylaxis  Lovenox 40 mg subcu daily  SCDs    I spent 60 minutes in the professional and overall care of this patient.      Gunner Suarez DO

## 2024-02-22 NOTE — PROGRESS NOTES
02/22/24 1017   Discharge Planning   Living Arrangements Spouse/significant other   Support Systems Spouse/significant other   Assistance Needed independent prior to this admit   Type of Residence Private residence   Home or Post Acute Services In home services   Type of Home Care Services Home nursing visits;Home OT;Home PT   Patient expects to be discharged to: Home with new HHC     Met with patient and her brother at bedside  Explained role of TCC  Patient admitted for dizziness, n/v    Patient states she lives with spouse in ranch home, normally does not use DME, now with c/o dizziness with n/v. Has has HHC in the past (2019), does not remember agency, would like referral to HHC placed, does not have AOC, has [facility] PCP and is agreeable to any agency that accepts her insurance, accepts her provider and has staff in her area.     ADOD 1-3 days  DC home with new HHC

## 2024-02-22 NOTE — TREATMENT PLAN
"Post admission progress note    Patient was seen and examined at bedside this morning, stated that her symptoms are much improved, and was surprised by incidental finding of metastatic lesion in her cerebellum.  Patient also reported that MRI of abdomen was planned by oncologist and wondering if she could have it done here.    Blood pressure 116/66, pulse 79, temperature 36.7 °C (98.1 °F), temperature source Temporal, resp. rate 18, height 1.626 m (5' 4.02\"), weight 84.8 kg (187 lb), SpO2 90 %..    Constitutional: Alert active, cooperative not in acute distress  Eyes: PERRLA, clear sclera  ENMT: Moist mucosal membranes, no exudate  Head / Neck: Atraumatic, normocephalic, supple neck, JVP not visualized  Lungs: Patent airways, CTABL  Heart: RRR, S1S2, no murmurs appreciated, palpable pulses in all extremities  GI: Soft, NT, ND, bowel sounds present in all quadrants  MSK: Moves all extremities freely, no restriction  of ROM, no joint edema  Extremities: Intact x 4, no peripheral edema  : No Watson catheter inserted  Breast: Deferred  Neurological: AAO x 3 to person, place and date, facial muscles symmetrical, sensation intact, strength 4/4, no acute focal neurological deficits appreciated  Psychological: Appropriate mood and behavior     Scheduled medications  amLODIPine, 5 mg, oral, Daily  calcium carbonate-vitamin D3, 1 tablet, oral, BID  chlorthalidone, 25 mg, oral, Daily  cholecalciferol, 1,000 Units, oral, Daily  dexAMETHasone, 4 mg, oral, q6h JAGDISH  enoxaparin, 40 mg, subcutaneous, q24h  levothyroxine, 100 mcg, oral, Daily before breakfast  magnesium oxide, 400 mg, oral, Daily  melatonin, 3 mg, oral, Daily  pantoprazole, 40 mg, oral, Daily before breakfast   Or  pantoprazole, 40 mg, intravenous, Daily before breakfast  PARoxetine, 10 mg, oral, Daily  potassium chloride, 20 mEq, oral, Daily      Continuous medications  sodium chloride 0.9%, 100 mL/hr, Last Rate: 100 mL/hr (02/22/24 0529)      PRN medications  PRN " medications: acetaminophen **OR** acetaminophen **OR** acetaminophen, codeine-guaifenesin, LORazepam, ondansetron **OR** ondansetron, prochlorperazine **OR** prochlorperazine **OR** [DISCONTINUED] prochlorperazine      Assessment and plan    Dizziness: Unclear etiology, not vertigo but responded to meclizine  -    Cerebellar mass likely from metastatic in etiology  -CT head shows: hyperdense cerebellar masses, the largest measures 10 x 14 mm, most consistent with metastatic disease. The larger lesion is located in the cerebellar vermis and associated with mild edema. No significant mass effect   -Received Decadron 10 mg IV x 1 in the ED  -Dexamethasone 4 mg p.o. daily  -MRI of the brain with and without contrast  -Neurosurgery consulted, appreciate management and recommendations    Breast cancer  -s/p follows up with oncology at [facility], [place]  -MRI of the abdomen with and without contrast as previously ordered by oncology team    Hypertension: Stable  -Continue home regimen 5 mg amlodipine daily, chlorthalidone 25 mg daily    Hypokalemia in a.m. lab  -Continue home potassium supplement, additional packet added in the a.m.    Diet  -Regular  -Ensure nutrition supplement added    DVT prophylaxis  -Heparin 40 mg subcu daily

## 2024-02-22 NOTE — CONSULTS
"Reason For Consult  Brain mets    History Of Present Illness  Chio Turner is a 63 y.o. female with h/o HTN, hypoT, anxiety, stage IV breast CA with known mets to liver and lung who presented with nausea and dizziness, found to have cerebellar lesions.    CTH personally reviewed from 2/22 demonstrating multiple hyperdense lesions, largest in midline cerebellar vermis measuring 1.2 cm, additional cerebellar and supratentorial sub-cm lesions, without brain compression or midline shift     Past Medical History  She has a past medical history of Other specified health status (06/16/2015).    Surgical History  She has a past surgical history that includes Tonsillectomy (06/16/2015); Hernia repair (06/16/2015); Other surgical history (01/23/2019); Other surgical history (01/24/2019); CT guided percutaneous biopsy lung (7/1/2015); and US guided needle liver biopsy (12/26/2023).     Social History  She reports that she has never smoked. She has never been exposed to tobacco smoke. She has never used smokeless tobacco. No history on file for alcohol use and drug use.    Family History  Family History   Problem Relation Name Age of Onset    Ovarian cancer Mother      Colon cancer Father          Allergies  Penicillins      Last Recorded Vitals  Blood pressure 116/66, pulse 79, temperature 36.7 °C (98.1 °F), temperature source Temporal, resp. rate 18, height 1.626 m (5' 4.02\"), weight 84.8 kg (187 lb), SpO2 90 %.    ROS  Neg except as stated in HPI    EXAM  GEN: Healthy appearing, well-developed, NAD  PSYCH: AOx3. Normal memory, mood, and affect  CV: well perfused   LUNGS: breathing comfortably on room air   ABD: Soft, NT/ND, NBS, no masses or organomegaly.?  Neuro:   Awake, Ox3   Face symmetric  EOMI  Fcx4 with symmetric strength    Mild BUE finger to nose dysmetria       Assessment/Plan     Pt with metastatic breast CA presenting with nausea and vertigo, found to have cerebellar lesions c/f metastases.    Please obtain MRI " brain w/wo contrast to evaluate  metastatic burden  Recommend Decadron, 4q6  Symptom control  Patient has appointment with oncologist on 2/27 for follow up. Patient will likely be a gamma knife candidate as an outpatient pending further imaging, and evaluation by oncology and radiation oncology. Further recs pending imaging    Discussed with Dr. Dk Bucio MD

## 2024-02-22 NOTE — CONSULTS
"Nutrition Assessment Note  Nutrition Assessment      Reason for Assessment  Reason for Assessment: Provider consult order  Hospital day #2 for vertigo, nausea.  Patient reports nausea resolved with compazine, but still hesitant to eat regular sized meals.  Encouraged her to take medication as ordered and select easy to digest meals.  Suggested smaller meals & snacks while here.     History:  Food and Nutrient History  Energy Intake: Good > 75 %  Food and Nutrient History: Reports good appetite until 1 day PTA.  States she ate pizza and had no probles with nausea. denies appetite loss or any taste changes related to cancer tx's.    Diagnosis   Malignant neoplasm of female breast (CMS/HCC)   Arthritis of knee   Breast cancer metastasized to lung. 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  10/25/23: 88 kg. 3/28/23: 89.8 kg.  4% loss over 4 months.  Significant Weight Loss: No  IBW/kg (Dietitian Calculated): 54.5 kg     Energy Needs:  Calculated Energy Needs Using Equations  Height: 162.6 cm (5' 4.02\")  Temp: 36.8 °C (98.2 °F)    Estimated Energy Needs  Method for Estimating Needs: 0983-1170 @ 25-30 kcal/kg    Estimated Protein Needs  Method for Estimating Needs: 65-76 @ 1.2-1.4 gr/kg    Estimated Fluid Needs  Total Fluid Estimated Needs (mL): 1635 mL  Total Fluid Estimated Needs (mL/kg): 30 mL/kg     Dietary Orders   Adult diet Regular    Oral nutritional supplements    Select supplement: Ensure Plus High Protein BID  Comments: chocolate     Nutrition Focused Physical " Findings:  Subcutaneous Fat Loss  Orbital Fat Pads: Well nourshed (slightly bulging fat pads)  Buccal Fat Pads: Well nourished (full, rounded cheeks)    Muscle Wasting  Temporalis: Well nourished (well-defined muscle)  Pectoralis (Clavicular Region): Well nourished (clavicle not visible)  Deltoid/Trapezius: Well nourished (rounded appearance at arm, shoulder, neck)  Interosseous: Well nourished (muscle bulges)    Edema  Edema: none    Results   Comprehensive metabolic panel  Result Value Ref Range   Glucose 192 (H) 74 - 99 mg/dL   Sodium 137 136 - 145 mmol/L   Potassium 3.3 (L) 3.5 - 5.3 mmol/L   Chloride 98 98 - 107 mmol/L   Bicarbonate 27 21 - 32 mmol/L   Anion Gap 15 10 - 20 mmol/L   Urea Nitrogen 16 6 - 23 mg/dL   Creatinine 0.71 0.50 - 1.05 mg/dL   eGFR >90 >60 mL/min/1.73m*2   Calcium 9.2 8.6 - 10.3 mg/dL   Albumin 4.0 3.4 - 5.0 g/dL   Alkaline Phosphatase 280 (H) 33 - 136 U/L   Total Protein 7.6 6.4 - 8.2 g/dL    (H) 9 - 39 U/L   Bilirubin, Total 2.2 (H) 0.0 - 1.2 mg/dL   ALT 76 (H) 7 - 45 U/L  Magnesium  Result Value Ref Range   Magnesium 1.90 1.60 - 2.40 mg/dL  CBC  Result Value Ref Range   WBC 3.0 (L) 4.4 - 11.3 x10*3/uL   nRBC 0.0 0.0 - 0.0 /100 WBCs   RBC 3.13 (L) 4.00 - 5.20 x10*6/uL   Hemoglobin 8.8 (L) 12.0 - 16.0 g/dL   Hematocrit 26.5 (L) 36.0 - 46.0 %   MCV 85 80 - 100 fL   MCH 28.1 26.0 - 34.0 pg   MCHC 33.2 32.0 - 36.0 g/dL   RDW 26.3 (H) 11.5 - 14.5 %   Platelets 88 (L) 150 - 450 x10*3/uL    Nutrition Diagnosis   Malnutrition Diagnosis  Patient has Malnutrition Diagnosis: No    Patient has Nutrition Diagnosis: Yes  Nutrition Diagnosis 1: Inadequate protein energy intake  Diagnosis Status (1): New  Related to (1): altered GI function  As Evidenced by (1): nausea, intake >50% of meals.     Scheduled medications  amLODIPine, 5 mg, oral, Daily  calcium carbonate-vitamin D3, 1 tablet, oral, BID  chlorthalidone, 25 mg, oral, Daily  cholecalciferol, 1,000 Units, oral, Daily  dexAMETHasone, 4 mg,  oral, q6h JAGDISH  enoxaparin, 40 mg, subcutaneous, q24h  levothyroxine, 100 mcg, oral, Daily before breakfast  magnesium oxide, 400 mg, oral, Daily  melatonin, 3 mg, oral, Daily  pantoprazole, 40 mg, oral, Daily before breakfast   Or  pantoprazole, 40 mg, intravenous, Daily before breakfast  PARoxetine, 10 mg, oral, Daily  potassium chloride, 20 mEq, oral, Daily      Continuous medications  sodium chloride 0.9%, 100 mL/hr, Last Rate: 100 mL/hr (02/22/24 0529)      PRN medications  PRN medications: acetaminophen **OR** acetaminophen **OR** acetaminophen, codeine-guaifenesin, LORazepam, ondansetron **OR** ondansetron, prochlorperazine **OR** prochlorperazine **OR** [DISCONTINUED] prochlorperazine     Nutrition Interventions/Recommendations   Nutrition Prescription  Individualized Nutrition Prescription Provided for : Continue regular diet and will start Ensure Plus BID.  Encouraged patient to take antiemetics as needed and select soft, easy to digest foods to start with.    Food and/or Nutrient Delivery Interventions  Meals and Snacks: General healthful diet  Goal: intake meets >75% estimated nutrient needs.     Nutrition Monitoring and Evaluation   Food and Nutrient Related History   Amount of Food: Estimated amout of food  Criteria: intake >75% of meals    Anthropometrics: Body Composition/Growth/Weight History  Weight: Measured weight  Criteria: daily  Weight Change: Weight change percentage  Criteria: weekly    Biochemical Data, Medical Tests and Procedures  Electrolyte and Renal Panel: BUN, Calcium, serum, Chloride, Creatinine, Magnesium, Phosphorus, Potassium, Sodium  Criteria: as indicated  Glucose/Endocrine Profile: Glucose, casual, Hemoglobin A1c (HgbA1c)  Criteria: as indicated  Nutritional Anemia Profile: Folate, serum, Hematocrit, Hemoglobin, Iron, serum  Criteria: as indicated  Vitamin Profile: Vitamin D, 25 hydroxy, Other (Comment)  Criteria: as indicated    Nutrition Focused Physical Findings   Digestive  System: Anorexia, Constipation, Diarrhea, Early satiety, Nausea, Vomiting  Criteria: daily    Follow Up  Last Date of Nutrition Visit: 02/22/24  Nutrition Follow-Up Needed?: Dietitian to reassess per policy  Follow up Comment: nausea resolved, poor intake-TR

## 2024-02-22 NOTE — DISCHARGE INSTRUCTIONS
You have been referred to [facility] - (Home [facility]) [phone]  They will contact you after discharge to set up an in home appointment, if you do not hear from them within 1-2 days after discharge, please call their office at the number provided above.

## 2024-02-22 NOTE — PROGRESS NOTES
Patient is a 63-year-old female with past medical history significant for breast cancer with concern for mets to the liver on oral chemo who presented for nausea vomiting dizziness.  It started in the morning.  She was initially seen and evaluated by Dr. Scruggs.  Please see initial physician note for details.  Patient was signed out to me pending CT angio and admission.  CT angio head showed 2 hyperdense cerebellar masses concerning for metastatic disease.  No mass effect is appreciated.  There is no significant stenosis of the cervical carotid or vertebral artery.  No intracranial major branch occlusion or hemodynamically significant stenosis.  Her partially imaged pulmonary and intrathoracic metastatic disease.    I spoke to Dr. Richard, oncology at [facility].  Due to lack of beds patient unable to transfer at this time they will place her on a waiting list.  Recommends steroids and neurosurgery consult.  Patient was discussed with neurosurgical team, Dr. Bucio.  Agrees with plan for 10 mg of dexamethasone, MRI for further evaluation.  Patient remains in stable condition at this time.  She is given additional dose of Zofran for ongoing nausea.      Tanna Sanchez MD

## 2024-02-22 NOTE — TELEPHONE ENCOUNTER
I called Daniel (pt ) for an update for how Nba was feeling, Daniel did not answer, left a VM for him to call us if the ativan did not help however if she is feeling OK then he does not need to give our office a call back. Message routed to team to give them the update regarding patient.

## 2024-02-23 ENCOUNTER — APPOINTMENT (OUTPATIENT)
Dept: CARDIOLOGY | Facility: HOSPITAL | Age: 64
DRG: 055 | End: 2024-02-23
Payer: MEDICARE

## 2024-02-23 ENCOUNTER — APPOINTMENT (OUTPATIENT)
Dept: RADIOLOGY | Facility: HOSPITAL | Age: 64
DRG: 055 | End: 2024-02-23
Payer: MEDICARE

## 2024-02-23 LAB
ALBUMIN SERPL BCP-MCNC: 3.4 G/DL (ref 3.4–5)
ALP SERPL-CCNC: 210 U/L (ref 33–136)
ALT SERPL W P-5'-P-CCNC: 62 U/L (ref 7–45)
ANION GAP SERPL CALC-SCNC: 9 MMOL/L (ref 10–20)
AST SERPL W P-5'-P-CCNC: 80 U/L (ref 9–39)
BILIRUB SERPL-MCNC: 1.3 MG/DL (ref 0–1.2)
BUN SERPL-MCNC: 17 MG/DL (ref 6–23)
CALCIUM SERPL-MCNC: 9.2 MG/DL (ref 8.6–10.3)
CHLORIDE SERPL-SCNC: 102 MMOL/L (ref 98–107)
CO2 SERPL-SCNC: 31 MMOL/L (ref 21–32)
CREAT SERPL-MCNC: 0.76 MG/DL (ref 0.5–1.05)
EGFRCR SERPLBLD CKD-EPI 2021: 87 ML/MIN/1.73M*2
ERYTHROCYTE [DISTWIDTH] IN BLOOD BY AUTOMATED COUNT: 26.8 % (ref 11.5–14.5)
GLUCOSE BLD MANUAL STRIP-MCNC: 151 MG/DL (ref 74–99)
GLUCOSE SERPL-MCNC: 143 MG/DL (ref 74–99)
HCT VFR BLD AUTO: 24.2 % (ref 36–46)
HGB BLD-MCNC: 8.1 G/DL (ref 12–16)
HOLD SPECIMEN: NORMAL
MCH RBC QN AUTO: 28.6 PG (ref 26–34)
MCHC RBC AUTO-ENTMCNC: 33.5 G/DL (ref 32–36)
MCV RBC AUTO: 86 FL (ref 80–100)
NRBC BLD-RTO: 0 /100 WBCS (ref 0–0)
PLATELET # BLD AUTO: 97 X10*3/UL (ref 150–450)
POTASSIUM SERPL-SCNC: 3.1 MMOL/L (ref 3.5–5.3)
PROT SERPL-MCNC: 6.3 G/DL (ref 6.4–8.2)
RBC # BLD AUTO: 2.83 X10*6/UL (ref 4–5.2)
SODIUM SERPL-SCNC: 139 MMOL/L (ref 136–145)
WBC # BLD AUTO: 4.9 X10*3/UL (ref 4.4–11.3)

## 2024-02-23 PROCEDURE — 82947 ASSAY GLUCOSE BLOOD QUANT: CPT

## 2024-02-23 PROCEDURE — 70553 MRI BRAIN STEM W/O & W/DYE: CPT

## 2024-02-23 PROCEDURE — 2500000001 HC RX 250 WO HCPCS SELF ADMINISTERED DRUGS (ALT 637 FOR MEDICARE OP): Performed by: INTERNAL MEDICINE

## 2024-02-23 PROCEDURE — 1100000001 HC PRIVATE ROOM DAILY

## 2024-02-23 PROCEDURE — 2550000001 HC RX 255 CONTRASTS: Performed by: INTERNAL MEDICINE

## 2024-02-23 PROCEDURE — 70553 MRI BRAIN STEM W/O & W/DYE: CPT | Performed by: RADIOLOGY

## 2024-02-23 PROCEDURE — 93005 ELECTROCARDIOGRAM TRACING: CPT

## 2024-02-23 PROCEDURE — 80053 COMPREHEN METABOLIC PANEL: CPT | Performed by: INTERNAL MEDICINE

## 2024-02-23 PROCEDURE — A9575 INJ GADOTERATE MEGLUMI 0.1ML: HCPCS | Performed by: INTERNAL MEDICINE

## 2024-02-23 PROCEDURE — 2500000004 HC RX 250 GENERAL PHARMACY W/ HCPCS (ALT 636 FOR OP/ED): Performed by: INTERNAL MEDICINE

## 2024-02-23 PROCEDURE — 99232 SBSQ HOSP IP/OBS MODERATE 35: CPT | Performed by: INTERNAL MEDICINE

## 2024-02-23 PROCEDURE — 2500000002 HC RX 250 W HCPCS SELF ADMINISTERED DRUGS (ALT 637 FOR MEDICARE OP, ALT 636 FOR OP/ED): Mod: MUE | Performed by: INTERNAL MEDICINE

## 2024-02-23 PROCEDURE — 85027 COMPLETE CBC AUTOMATED: CPT | Performed by: INTERNAL MEDICINE

## 2024-02-23 RX ORDER — GADOTERATE MEGLUMINE 376.9 MG/ML
18 INJECTION INTRAVENOUS
Status: COMPLETED | OUTPATIENT
Start: 2024-02-23 | End: 2024-02-23

## 2024-02-23 RX ORDER — POTASSIUM CHLORIDE 1.5 G/1.58G
40 POWDER, FOR SOLUTION ORAL ONCE
Status: COMPLETED | OUTPATIENT
Start: 2024-02-23 | End: 2024-02-23

## 2024-02-23 RX ADMIN — PAROXETINE 10 MG: 10 TABLET, FILM COATED ORAL at 09:10

## 2024-02-23 RX ADMIN — CHLORTHALIDONE 25 MG: 25 TABLET ORAL at 09:10

## 2024-02-23 RX ADMIN — DEXAMETHASONE 4 MG: 4 TABLET ORAL at 11:06

## 2024-02-23 RX ADMIN — SODIUM CHLORIDE 100 ML/HR: 9 INJECTION, SOLUTION INTRAVENOUS at 06:11

## 2024-02-23 RX ADMIN — Medication 1 TABLET: at 09:10

## 2024-02-23 RX ADMIN — LORAZEPAM 1 MG: 1 TABLET ORAL at 15:08

## 2024-02-23 RX ADMIN — PROCHLORPERAZINE EDISYLATE 10 MG: 5 INJECTION INTRAMUSCULAR; INTRAVENOUS at 15:20

## 2024-02-23 RX ADMIN — Medication 1 TABLET: at 20:32

## 2024-02-23 RX ADMIN — ENOXAPARIN SODIUM 40 MG: 40 INJECTION SUBCUTANEOUS at 05:55

## 2024-02-23 RX ADMIN — DEXAMETHASONE 4 MG: 4 TABLET ORAL at 00:16

## 2024-02-23 RX ADMIN — DEXAMETHASONE 4 MG: 4 TABLET ORAL at 17:36

## 2024-02-23 RX ADMIN — AMLODIPINE BESYLATE 5 MG: 5 TABLET ORAL at 09:10

## 2024-02-23 RX ADMIN — SODIUM CHLORIDE 100 ML/HR: 9 INJECTION, SOLUTION INTRAVENOUS at 17:36

## 2024-02-23 RX ADMIN — POTASSIUM CHLORIDE 40 MEQ: 1.5 POWDER, FOR SOLUTION ORAL at 09:11

## 2024-02-23 RX ADMIN — GADOTERATE MEGLUMINE 18 ML: 376.9 INJECTION INTRAVENOUS at 16:04

## 2024-02-23 RX ADMIN — Medication 1000 UNITS: at 09:10

## 2024-02-23 RX ADMIN — LEVOTHYROXINE SODIUM 100 MCG: 100 TABLET ORAL at 06:05

## 2024-02-23 RX ADMIN — Medication 3 MG: at 20:32

## 2024-02-23 RX ADMIN — Medication 400 MG: at 09:10

## 2024-02-23 RX ADMIN — POTASSIUM CHLORIDE 20 MEQ: 1.5 POWDER, FOR SOLUTION ORAL at 09:12

## 2024-02-23 RX ADMIN — DEXAMETHASONE 4 MG: 4 TABLET ORAL at 05:56

## 2024-02-23 RX ADMIN — PANTOPRAZOLE SODIUM 40 MG: 40 TABLET, DELAYED RELEASE ORAL at 06:05

## 2024-02-23 ASSESSMENT — COGNITIVE AND FUNCTIONAL STATUS - GENERAL
TOILETING: A LITTLE
DRESSING REGULAR UPPER BODY CLOTHING: A LITTLE
HELP NEEDED FOR BATHING: A LITTLE
MOBILITY SCORE: 23
MOBILITY SCORE: 22
EATING MEALS: A LITTLE
DRESSING REGULAR LOWER BODY CLOTHING: A LITTLE
CLIMB 3 TO 5 STEPS WITH RAILING: A LITTLE
PERSONAL GROOMING: A LITTLE
STANDING UP FROM CHAIR USING ARMS: A LITTLE
DAILY ACTIVITIY SCORE: 18
STANDING UP FROM CHAIR USING ARMS: A LITTLE

## 2024-02-23 ASSESSMENT — PAIN SCALES - GENERAL
PAINLEVEL_OUTOF10: 0 - NO PAIN

## 2024-02-23 ASSESSMENT — PAIN - FUNCTIONAL ASSESSMENT
PAIN_FUNCTIONAL_ASSESSMENT: 0-10
PAIN_FUNCTIONAL_ASSESSMENT: 0-10

## 2024-02-23 NOTE — PROGRESS NOTES
Prior to Admission Medications   Prescriptions Last Dose Informant   LORazepam (Ativan) 1 mg tablet 2/21/2024 Self   Sig: Take 1 tablet (1 mg) by mouth see administration instructions. Take 1 tablet 30 mins before MRCP. May repeat x once   NON FORMULARY 2/21/2024 Self   Sig: Take 1 each by mouth 2 times a day.  Turmeric Curcumin Oral Capsule   PaxiL 10 mg tablet 2/21/2024 Self   Sig: Take 1 tablet (10 mg) by mouth once daily.   amLODIPine (Norvasc) 5 mg tablet 2/21/2024 Self   Sig: Take 1 tablet (5 mg) by mouth once daily.   calcium carbonate-vitamin D3 1,000 mg-20 mcg (800 unit) tablet 2/21/2024 Self   Sig: Take by mouth 2 times a day.   chlorthalidone (Hygroton) 25 mg tablet 2/21/2024 Self   Sig: Take 1 tablet (25 mg) by mouth once daily.   cholecalciferol (Vitamin D-3) 25 MCG (1000 UT) capsule 2/21/2024 Self   Sig: Take 1 capsule (25 mcg) by mouth once daily.   hydroCHLOROthiazide (HYDRODiuril) 25 mg tablet Past Week Self   Sig: Take 1 tablet (25 mg) by mouth once daily.   levothyroxine (Synthroid, Levoxyl) 100 mcg tablet 2/21/2024 Self   Sig: Take 1 tablet (100 mcg) by mouth once daily in the morning. Take before meals.   magnesium oxide (Mag-Ox) 400 mg (241.3 mg magnesium) tablet 2/21/2024 Self   Sig: Take 1 tablet (400 mg) by mouth once daily.   multivit-min-iron-FA-vit K-lut (Centrum Silver Women) 8 mg iron-400 mcg-50 mcg tablet 2/21/2024 Self   Sig: Take 1 tablet by mouth once daily.   ondansetron (Zofran) 8 mg tablet 2/21/2024 Self   Sig: Take 1 tablet (8 mg) by mouth every 8 hours if needed.   potassium chloride CR 20 mEq ER tablet 2/21/2024 Self   Sig: Take 2 tablets (40 mEq) by mouth twice a day.   ribociclib (Kisqali) 3 x 200 mg tablet therapy pack 2/21/2024 Self   Sig: Take 3 tablets (600 mg total) by mouth in the morning for 21 days on, followed by 7 days off per 28-day treatment cycle. Repeat cycle every 28 days.      Facility-Administered Medications: None         Spoke with Patient  Bro Armenta  CPhT

## 2024-02-23 NOTE — PROGRESS NOTES
02/23/24 1008   Discharge Planning   Living Arrangements Spouse/significant other   Support Systems Spouse/significant other   Assistance Needed Patient independent at baseline   Type of Residence Private residence   Do you have animals or pets at home? No   Who is requesting discharge planning? Provider   Home or Post Acute Services In home services   Type of Home Care Services Home nursing visits;Home OT;Home PT   Patient expects to be discharged to: Patient plan once medically clear is to D/C home with [facility] home care. Physician to do EXTERNAL referral and final orders to be sent.   Does the patient need discharge transport arranged? No

## 2024-02-23 NOTE — PROGRESS NOTES
Chio Turner is a 63 y.o. female on day 1 of admission presenting with Vertigo.      Subjective   Patient was seen and examined at bedside in company of , states that she slept well, and having less dizziness, with improved gait stability as well.  Patient was looking forward to MRI of the brain with and without contrast, which showed followed by MRCP with and without contrast.       Objective     Last Recorded Vitals  /70 (BP Location: Left arm, Patient Position: Lying)   Pulse 79   Temp 37.1 °C (98.7 °F) (Temporal)   Resp 18   Wt 84.4 kg (186 lb)   SpO2 96%   Intake/Output last 3 Shifts:    Intake/Output Summary (Last 24 hours) at 2/23/2024 1853  Last data filed at 2/23/2024 0624  Gross per 24 hour   Intake 1367.67 ml   Output --   Net 1367.67 ml       Admission Weight  Weight: 84.8 kg (187 lb) (02/21/24 2050)    Daily Weight  02/22/24 : 84.4 kg (186 lb)    Image Results  MR brain w and wo IV contrast  Narrative: Interpreted By:  Tree Sin,  and Tamanna Lam   STUDY:  MR BRAIN W AND WO IV CONTRAST;  2/23/2024 4:03 pm      INDICATION:  Signs/Symptoms:new brain mets.      COMPARISON:  CTA head and neck dated 02/21/2024      ACCESSION NUMBER(S):  [id number]      ORDERING CLINICIAN:  BONNIE LACY      TECHNIQUE:  Axial T2, FLAIR, DWI, gradient echo T2 and sagittal and coronal T1  weighted images of brain were acquired. Post contrast T1 weighted  images were acquired after administration of 18 mL Dotarem gadolinium  based intravenous contrast.      FINDINGS:  CSF Spaces: The ventricles, sulci and basal cisterns are within  normal limits. No abnormal extra-axial fluid collection.      Parenchyma: There is a 1.3 x 1.2 cm lesion centered on the cerebellar  vermis corresponding with the hyperdense lesion seen on prior CT. It  demonstrates T2 and FLAIR hypointensity, blooming artifact on  gradient echo imaging, and heterogeneous appearance on T1 weighted  imaging. No definite enhancement is  seen on the postcontrast imaging.  A 2nd lesion with similar imaging characteristics is seen in the  parietal lobe adjacent to the splenium of the corpus callosum. There  is a thin rim of FLAIR hyperintensity surrounding both lesions,  consistent with edema. There is questionable subtle enhancement  associated with the smaller lesion (series [id number], image 183).  Additionally there is a 4 mm enhancing lesion in the superior aspect  of the right cerebellum (Image 227) and a 3 mm focus of enhancement  within the superior aspect of the left cerebellar hemisphere (Image  221). There is no diffusion restriction abnormality to suggest acute  infarct. There is no mass effect or midline shift.      Paranasal Sinuses and Mastoids: Visualized paranasal sinuses and  mastoid air cells are unremarkable.      Impression: Hemorrhagic lesions in the cerebellar vermis and right parietal lobe  adjacent to the splenium of the corpus callosum along with a few  small enhancing cerebellar lesions are nonspecific. Consider  hemorrhagic metastases as well as hemorrhages due to slow flow  vascular malformations.      I personally reviewed the images/study and I agree with the findings  as stated by resident physician Dr. Delvin Nolen.      MACRO:  None      Signed by: Tree Sin 2/23/2024 4:34 PM  Dictation workstation:   [id number]      Physical Exam  Constitutional: Pleasant, alert active, cooperative not in acute distress  Eyes: PERRLA, clear sclera  ENMT: Moist mucosal membranes, no exudate  Head / Neck: Atraumatic, normocephalic, supple neck, JVP not visualized  Lungs: Patent airways, CTABL  Heart: RRR, S1S2, no murmurs appreciated, palpable pulses in all extremities  GI: Soft, NT, ND, bowel sounds present in all quadrants  MSK: Moves all extremities freely, no restriction  of ROM, no joint edema  Extremities: Intact x 4, no peripheral edema  : No Watson catheter inserted  Breast: Deferred  Neurological: AAO x 3 to person,  place and date, facial muscles symmetrical, sensation intact, strength 4/4, no acute focal neurological deficits appreciated  Psychological: Appropriate mood and behavior      Relevant Results           Scheduled medications  amLODIPine, 5 mg, oral, Daily  calcium carbonate-vitamin D3, 1 tablet, oral, BID  chlorthalidone, 25 mg, oral, Daily  cholecalciferol, 1,000 Units, oral, Daily  dexAMETHasone, 4 mg, oral, q6h JAGDISH  enoxaparin, 40 mg, subcutaneous, q24h  levothyroxine, 100 mcg, oral, Daily before breakfast  magnesium oxide, 400 mg, oral, Daily  melatonin, 3 mg, oral, Daily  pantoprazole, 40 mg, oral, Daily before breakfast   Or  pantoprazole, 40 mg, intravenous, Daily before breakfast  PARoxetine, 10 mg, oral, Daily  potassium chloride, 20 mEq, oral, Daily      Continuous medications  sodium chloride 0.9%, 100 mL/hr, Last Rate: 100 mL/hr (02/23/24 1736)      PRN medications  PRN medications: acetaminophen **OR** acetaminophen **OR** acetaminophen, codeine-guaifenesin, LORazepam, ondansetron **OR** ondansetron, prochlorperazine **OR** prochlorperazine **OR** [DISCONTINUED] prochlorperazine  MR brain w and wo IV contrast    Result Date: 2/23/2024  Interpreted By:  Tree Sin,  and Tamanna Lam STUDY: MR BRAIN W AND WO IV CONTRAST;  2/23/2024 4:03 pm   INDICATION: Signs/Symptoms:new brain mets.   COMPARISON: CTA head and neck dated 02/21/2024   ACCESSION NUMBER(S): [id number]   ORDERING CLINICIAN: BONNIE LACY   TECHNIQUE: Axial T2, FLAIR, DWI, gradient echo T2 and sagittal and coronal T1 weighted images of brain were acquired. Post contrast T1 weighted images were acquired after administration of 18 mL Dotarem gadolinium based intravenous contrast.   FINDINGS: CSF Spaces: The ventricles, sulci and basal cisterns are within normal limits. No abnormal extra-axial fluid collection.   Parenchyma: There is a 1.3 x 1.2 cm lesion centered on the cerebellar vermis corresponding with the hyperdense lesion seen  on prior CT. It demonstrates T2 and FLAIR hypointensity, blooming artifact on gradient echo imaging, and heterogeneous appearance on T1 weighted imaging. No definite enhancement is seen on the postcontrast imaging. A 2nd lesion with similar imaging characteristics is seen in the parietal lobe adjacent to the splenium of the corpus callosum. There is a thin rim of FLAIR hyperintensity surrounding both lesions, consistent with edema. There is questionable subtle enhancement associated with the smaller lesion (series [id number], image 183). Additionally there is a 4 mm enhancing lesion in the superior aspect of the right cerebellum (Image 227) and a 3 mm focus of enhancement within the superior aspect of the left cerebellar hemisphere (Image 221). There is no diffusion restriction abnormality to suggest acute infarct. There is no mass effect or midline shift.   Paranasal Sinuses and Mastoids: Visualized paranasal sinuses and mastoid air cells are unremarkable.       Hemorrhagic lesions in the cerebellar vermis and right parietal lobe adjacent to the splenium of the corpus callosum along with a few small enhancing cerebellar lesions are nonspecific. Consider hemorrhagic metastases as well as hemorrhages due to slow flow vascular malformations.   I personally reviewed the images/study and I agree with the findings as stated by resident physician Dr. Delvin Nolen.   MACRO: None   Signed by: Tree Sin 2/23/2024 4:34 PM Dictation workstation:   [id number]    Onco-Echo Limited (Strain And 3D)    Result Date: 2/22/2024   [facility] [place], [address] [place] [address]              Tel [phone] and Fax [phone] TRANSTHORACIC ECHOCARDIOGRAM REPORT  Patient Name:      TJ Lora Physician: [id number] Faraz Anthony DO Study Date:        2/20/2024           Ordering Provider: [id number] OTTONIEL POWERS MRN/PID:           [id number]             Fellow: Accession#:        [id number]        Nurse: Date of Birth/Age: 1960 / 63     Sonographer:       Sascha Stanton RDCS                    years Gender:            F                   Additional Staff: Height:            162.56 cm           Admit Date: Weight:            89.36 kg            Admission Status:  Outpatient BSA / BMI:         1.94 m2 / 33.82     Encounter#:        [id number]                    kg/m2 Blood Pressure: 148 /84 mmHg Study Type:    ONCO-ECHO LIMITED (STRAIN AND 3D) Diagnosis/ICD: Malignant neoplasm of unspecified site of left female                breast-C50.912 Indication:    Malignant neoplasm of female breast CPT Code:      Echo Limited-86838; Doppler Limited-32744; Color Doppler-33787 Patient History: Diabetes:          Yes Pertinent History: HTN. Study Detail: The following Echo studies were performed: 2D, M-Mode, Doppler,               color flow, Strain and 3D.  PHYSICIAN INTERPRETATION: Left Ventricle: The left ventricular systolic function is hyperdynamic, with an estimated ejection fraction of 70-75%. The calculated ejection fraction is normal at 72 % using the Burnham's Bi-plane MOD calculation. There are no regional wall motion abnormalities. The left ventricular cavity size was not assessed. Left Ventricular Global Longitudinal Strain - 23.3 %. Left ventricular diastolic filling was not assessed. Strain values are normal, which imply normal myocardial function. Left Atrium: The left atrium was not assessed. Right Ventricle: The right ventricle is normal in size. There is hyperdynamic right ventricular function. Right Atrium: The right atrium was not assessed. Aortic Valve: The aortic valve is trileaflet. The aortic valve appears tricuspid. There is minimal aortic valve cusp calcification. Aortic valve regurgitation was not assessed. Mitral Valve: The mitral valve is normal in structure. Mitral valve regurgitation was not assessed. Tricuspid Valve: The tricuspid valve  is structurally normal. There is trace tricuspid regurgitation. The Doppler estimated RVSP is slightly elevated at 32.7 mmHg. Pulmonic Valve: The pulmonic valve is structurally normal. Pulmonic valve regurgitation was not assessed. Pericardium: There is no pericardial effusion noted. Aorta: The aortic root was not assessed. In comparison to the previous echocardiogram(s): Compared with study from 11/7/2023, no significant change.  CONCLUSIONS:  1. Left ventricular systolic function is hyperdynamic with a 70-75% estimated ejection fraction.  2. Slightly elevated RVSP. QUANTITATIVE DATA SUMMARY: LV SYSTOLIC FUNCTION BY 2D PLANIMETRY (MOD):                                          Normal Ranges: EF-A4C View:                      71.8 % (>=55%) EF-A2C View:                      73.6 % EF-Biplane:                       72.0 % Global Longitudinal Strain (GLS): 23.3 % TRICUSPID VALVE/RVSP:                             Normal Ranges: Peak TR Velocity: 2.72 m/s RV Syst Pressure: 32.7 mmHg (< 30mmHg)  [id number] Faraz Anthony DO Electronically signed on 2/22/2024 at 11:55:27 AM  ** Final **     ECG 12 lead    Result Date: 2/22/2024  Normal sinus rhythm Left ventricular hypertrophy with QRS widening ( R in aVL , Bentley product ) Nonspecific ST and T wave abnormality Abnormal ECG When compared with ECG of 15-FEB-2024 11:57, Nonspecific T wave abnormality now evident in Anterolateral leads    CT angio head and neck w and wo IV contrast    Result Date: 2/21/2024  Interpreted By:  Kerry Burgos, STUDY: CT ANGIO HEAD AND NECK W AND WO IV CONTRAST;  2/21/2024 10:59 pm   INDICATION: Signs/Symptoms:Atypical dizziness, history of [place] breast cancer   COMPARISON: Chest CT 02/16/2024   ACCESSION NUMBER(S): [id number]   ORDERING CLINICIAN: KOREY MICHAELS   TECHNIQUE: Multiple contiguous low-dose axial noncontrast images of the head were obtained. Following IV contrast administration, a CT angiography of the head  and neck was  performed.  Triplanar MIPS  and 3D reconstructions of the [place]  and neck were generated.   FINDINGS: LOW-DOSE NON-CONTRAST HEAD CT:   There is a hyperdense mass with mild surrounding hypodensity suggesting edema in the cerebellar midline/vermis measuring 10 x 14 mm. Additional 6 mm similar density mass is noted in the right superior cerebellar hemisphere. No mass effect or midline shift. Questionable subcentimeter mass involving the right lentiform nucleus. No evidence of acute intracranial hemorrhage, large vessel ischemic infarct or extra-axial fluid collection. Gray-white matter distinction is preserved.   No effacement of the basal cisterns.   The brain parenchymal volume and ventricles are age-appropriate. No hydrocephalus.   No acute skull fracture. The visualized orbits  are intact. The visualized paranasal sinuses show  no significant abnormality. The mastoids are clear.     CTA NECK:   The great vessel origins are patent with no significant stenosis.   LEFT VERTEBRAL ARTERY:  No hemodynamically significant stenosis, occlusion, or dissection.   LEFT COMMON/INTERNAL CAROTID ARTERY: Mild calcification of the carotid bifurcation. No hemodynamically significant stenosis, occlusion, or dissection.   RIGHT VERTEBRAL ARTERY:  No hemodynamically significant stenosis, occlusion, or dissection.   RIGHT COMMON/INTERNAL CAROTID ARTERY: Calcification of the carotid bifurcation with approximately 10% stenosis. No hemodynamically significant stenosis, occlusion, or dissection.     The neck soft tissues show no evidence of mass, fluid collection, or enlarged lymph nodes.   There is no acute osseous abnormality. Right upper lobe mass and several scattered bilateral apical nodules are stable. Grossly stable soft tissue mass encasing the right mainstem bronchus extending to the subcarinal region. Stable right pretracheal lymph nodes measuring up to 1.6 cm in short axis.     CTA HEAD:   ANTERIOR CIRCULATION: No  hemodynamically significant intracranial stenosis, occlusion, or aneurysm.   - Internal Carotid Arteries: Patent with no hemodynamically significant stenosis.   - Middle Cerebral Arteries: Patent with no hemodynamically significant stenosis.   - Anterior Cerebral Arteries: Patent with no hemodynamically significant stenosis.     POSTERIOR CIRCULATION: No hemodynamically significant intracranial stenosis, occlusion, or aneurysm.   - Intracranial Vertebral Arteries: Patent with no hemodynamically significant stenosis.   - Basilar Artery: Patent with no hemodynamically significant stenosis.   - Posterior Cerebral Arteries: Patent with no hemodynamically significant stenosis.   No arteriovenous malformation is visualized. No pathologic intracranial enhancement or discrete mass. The dural venous sinuses are patent.   MIPS and 3D reconstructions confirm the above findings.       1. 2 hyperdense cerebellar masses, the largest measures 10 x 14 mm, most consistent with metastatic disease. The larger lesion is located in the cerebellar vermis and associated with mild edema. No significant mass effect. Questionable small mass involving the right lentiform nucleus. MRI brain with contrast is more sensitive evaluation for metastatic disease and may be considered.   2. No significant stenosis of the cervical carotid or vertebral arteries.   3. No intracranial major branch occlusion or hemodynamically significant stenosis.   4. Partially imaged pulmonary/intrathoracic metastatic disease.   MACRO: None   Signed by: Kerry Burgos 2/21/2024 11:41 PM Dictation workstation:   [id number]    CT chest w IV contrast    Result Date: 2/16/2024  STUDY: CT Chest with IV Contrast; 2/16/24 at 1:46 PM INDICATION: Persistent cough.  History breast CA. COMPARISON: NM whole bony 12/15/23. CT CAP 12/15/23. ACCESSION NUMBER(S): [id number] ORDERING CLINICIAN: OTTONIEL POWERS TECHNIQUE:  CT of the chest was performed with intravenous contrast.   Omnipaque 350 50 mL was administered intravenously. Automated mA/kV exposure control was utilized and patient examination was performed in strict accordance with principles of ALARA. FINDINGS: MEDIASTINUM: The heart is enlarged.  No pericardial effusion.  Negative for thoracic aortic dissection.  Cannot evaluate for pulmonary embolism due to timing of the contrast bolus.  Mediastinal and hilar lymphadenopathy.  Right upper paratracheal lymph node measures 2.1 x 1.5 cm and previously measures 1.8 x 1.6 cm.  Subcarinal lymphadenopathy 3.9 x 2.8 cm and previously measures 4.0 x 2.2 cm. Right chest port.  Postsurgical changes bilateral breasts. LUNGS/PLEURA: There is no pleural effusion, pleural thickening, or pneumothorax. The airways are patent. No regions of airspace consolidation.  Pulmonary metastasis.  0.8 cm nodule apical segment left upper lobe unchanged.  0.9 cm nodule within the lingula previously measures 1.3 cm and decreased.  Multiple nodules left lower lobe unchanged.  A mass within the right lower lobe currently measures 4.8 x 4.3 cm and previously measures 4.5 x 3.8 cm. Mass within the medial aspect of the right upper lobe measures 4.1 x 2.3 cm and previously measures 4.3 x 2.2 cm. LYMPH NODES: See above UPPER ABDOMEN: Multiple hypodense lesions throughout the liver are increased in size and number since prior exam.  A hypodense lesion within the right hepatic lobe image 40 measures 1.8 x 1.4 cm previously measures 1.4 x 1.1 cm.  Fat density right adrenal mass 2.6 x 1.8 cm.  Splenomegaly and calcified splenic granulomas. BONES: There are no acute fractures.  No suspicious bony lesions.    No regions of airspace consolidation. Progression of metastatic disease.  Increase in size and number of liver metastasis.  Increase in mediastinal lymphadenopathy.  Pulmonary metastasis.  A large mass within the right lower lobe has increased in size. Cardiomegaly. Signed by Gunner Bess MD    ECG 12 lead (Ancillary  Performed)    Result Date: 2/15/2024  Normal sinus rhythm Incomplete right bundle branch block Cannot rule out Anterior infarct (cited on or before 12-JAN-2024) Abnormal ECG When compared with ECG of 12-JAN-2024 13:50, Questionable change in initial forces of Septal leads Confirmed by Phu Kern ([id number]) on 2/15/2024 6:36:29 PM       Assessment/Plan    63 y.o. 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  She felt well when she woke up in the morning took her medications and then began feeling lightheaded and unsteady on her feet.  She was concerned that she was having vertigo so she attempted vestibular Exercises but her symptoms did not improve       Principal Problem:    Vertigo    Dizziness: Unclear etiology, not vertigo but responded to meclizine  -Improving, as well as mobility  -Continue meclizine as needed  -PT OT evaluation     Cerebellar mass likely from metastatic in etiology  -CT head shows: hyperdense cerebellar masses, the largest measures 10 x 14 mm, most consistent with metastatic disease. The larger lesion is located in the cerebellar vermis and associated with mild edema. No significant mass effect   -MRI of the brain with and without contrast  -Received Decadron 10 mg IV x 1 in the ED  -Dexamethasone 4 mg p.o. daily  -MRI of the brain with and without contrast  -Neurosurgery consulted, appreciate management and recommendations    Elevated LFTs and bilirubin  -Secure message from Dr. Kahn recommend MRCP, which was intended on next appointment [facility]  -MRCP tomorrow     Breast cancer  -s/p follows up with oncology at [facility]     Hypertension: Stable  -Continue home regimen 5 mg amlodipine daily, chlorthalidone 25 mg daily     Hypokalemia in a.m. lab  -Continue home potassium  supplement, additional packet added in the a.m.     Diet  -Regular  -Ensure nutrition supplement added     DVT prophylaxis  -Heparin 40 mg subcu daily     Disposition: Presented with dizziness, with evaluations showing cerebral mass suggestive for metastatic disease, need further evaluation, discharge pending final recommendation by neurosurgery.         Chas Perez, DO

## 2024-02-24 ENCOUNTER — APPOINTMENT (OUTPATIENT)
Dept: RADIOLOGY | Facility: HOSPITAL | Age: 64
DRG: 055 | End: 2024-02-24
Payer: MEDICARE

## 2024-02-24 VITALS
TEMPERATURE: 97.9 F | HEART RATE: 66 BPM | BODY MASS INDEX: 31.76 KG/M2 | DIASTOLIC BLOOD PRESSURE: 75 MMHG | OXYGEN SATURATION: 95 % | RESPIRATION RATE: 18 BRPM | HEIGHT: 64 IN | SYSTOLIC BLOOD PRESSURE: 145 MMHG | WEIGHT: 186 LBS

## 2024-02-24 LAB
ALBUMIN SERPL BCP-MCNC: 3.2 G/DL (ref 3.4–5)
ALP SERPL-CCNC: 182 U/L (ref 33–136)
ALT SERPL W P-5'-P-CCNC: 62 U/L (ref 7–45)
ANION GAP SERPL CALC-SCNC: 11 MMOL/L (ref 10–20)
AST SERPL W P-5'-P-CCNC: 75 U/L (ref 9–39)
ATRIAL RATE: 75 BPM
BILIRUB SERPL-MCNC: 1 MG/DL (ref 0–1.2)
BUN SERPL-MCNC: 17 MG/DL (ref 6–23)
CALCIUM SERPL-MCNC: 9 MG/DL (ref 8.6–10.3)
CHLORIDE SERPL-SCNC: 103 MMOL/L (ref 98–107)
CO2 SERPL-SCNC: 29 MMOL/L (ref 21–32)
CREAT SERPL-MCNC: 0.75 MG/DL (ref 0.5–1.05)
EGFRCR SERPLBLD CKD-EPI 2021: 88 ML/MIN/1.73M*2
ERYTHROCYTE [DISTWIDTH] IN BLOOD BY AUTOMATED COUNT: 26.5 % (ref 11.5–14.5)
GLUCOSE SERPL-MCNC: 143 MG/DL (ref 74–99)
HCT VFR BLD AUTO: 24.4 % (ref 36–46)
HGB BLD-MCNC: 8 G/DL (ref 12–16)
MCH RBC QN AUTO: 28.7 PG (ref 26–34)
MCHC RBC AUTO-ENTMCNC: 32.8 G/DL (ref 32–36)
MCV RBC AUTO: 88 FL (ref 80–100)
NRBC BLD-RTO: 0 /100 WBCS (ref 0–0)
P AXIS: 26 DEGREES
P OFFSET: 187 MS
P ONSET: 125 MS
PLATELET # BLD AUTO: 94 X10*3/UL (ref 150–450)
POTASSIUM SERPL-SCNC: 3.4 MMOL/L (ref 3.5–5.3)
PR INTERVAL: 166 MS
PROT SERPL-MCNC: 6.1 G/DL (ref 6.4–8.2)
Q ONSET: 208 MS
QRS COUNT: 13 BEATS
QRS DURATION: 120 MS
QT INTERVAL: 384 MS
QTC CALCULATION(BAZETT): 428 MS
QTC FREDERICIA: 413 MS
R AXIS: -22 DEGREES
RBC # BLD AUTO: 2.79 X10*6/UL (ref 4–5.2)
SODIUM SERPL-SCNC: 140 MMOL/L (ref 136–145)
T AXIS: 9 DEGREES
T OFFSET: 400 MS
VENTRICULAR RATE: 75 BPM
WBC # BLD AUTO: 4.4 X10*3/UL (ref 4.4–11.3)

## 2024-02-24 PROCEDURE — 85027 COMPLETE CBC AUTOMATED: CPT | Performed by: INTERNAL MEDICINE

## 2024-02-24 PROCEDURE — 2500000002 HC RX 250 W HCPCS SELF ADMINISTERED DRUGS (ALT 637 FOR MEDICARE OP, ALT 636 FOR OP/ED): Mod: MUE | Performed by: INTERNAL MEDICINE

## 2024-02-24 PROCEDURE — 2500000001 HC RX 250 WO HCPCS SELF ADMINISTERED DRUGS (ALT 637 FOR MEDICARE OP): Performed by: INTERNAL MEDICINE

## 2024-02-24 PROCEDURE — 76377 3D RENDER W/INTRP POSTPROCES: CPT | Performed by: RADIOLOGY

## 2024-02-24 PROCEDURE — 74183 MRI ABD W/O CNTR FLWD CNTR: CPT | Performed by: RADIOLOGY

## 2024-02-24 PROCEDURE — 99239 HOSP IP/OBS DSCHRG MGMT >30: CPT | Performed by: INTERNAL MEDICINE

## 2024-02-24 PROCEDURE — 84075 ASSAY ALKALINE PHOSPHATASE: CPT | Performed by: INTERNAL MEDICINE

## 2024-02-24 PROCEDURE — 74183 MRI ABD W/O CNTR FLWD CNTR: CPT

## 2024-02-24 PROCEDURE — A9575 INJ GADOTERATE MEGLUMI 0.1ML: HCPCS | Performed by: INTERNAL MEDICINE

## 2024-02-24 PROCEDURE — 2500000004 HC RX 250 GENERAL PHARMACY W/ HCPCS (ALT 636 FOR OP/ED): Performed by: INTERNAL MEDICINE

## 2024-02-24 PROCEDURE — 2550000001 HC RX 255 CONTRASTS: Performed by: INTERNAL MEDICINE

## 2024-02-24 RX ORDER — DEXAMETHASONE 4 MG/1
4 TABLET ORAL 4 TIMES DAILY
Qty: 120 TABLET | Refills: 0 | Status: ON HOLD | OUTPATIENT
Start: 2024-02-24 | End: 2024-03-12 | Stop reason: SDUPTHER

## 2024-02-24 RX ORDER — POTASSIUM CHLORIDE 1.5 G/1.58G
20 POWDER, FOR SOLUTION ORAL ONCE
Status: COMPLETED | OUTPATIENT
Start: 2024-02-24 | End: 2024-02-24

## 2024-02-24 RX ORDER — GADOTERATE MEGLUMINE 376.9 MG/ML
18 INJECTION INTRAVENOUS
Status: COMPLETED | OUTPATIENT
Start: 2024-02-24 | End: 2024-02-24

## 2024-02-24 RX ADMIN — POTASSIUM CHLORIDE 20 MEQ: 1.5 POWDER, FOR SOLUTION ORAL at 14:34

## 2024-02-24 RX ADMIN — CHLORTHALIDONE 25 MG: 25 TABLET ORAL at 10:06

## 2024-02-24 RX ADMIN — Medication 1 TABLET: at 14:33

## 2024-02-24 RX ADMIN — DEXAMETHASONE 4 MG: 4 TABLET ORAL at 00:06

## 2024-02-24 RX ADMIN — AMLODIPINE BESYLATE 5 MG: 5 TABLET ORAL at 10:06

## 2024-02-24 RX ADMIN — PANTOPRAZOLE SODIUM 40 MG: 40 TABLET, DELAYED RELEASE ORAL at 05:20

## 2024-02-24 RX ADMIN — LORAZEPAM 1 MG: 1 TABLET ORAL at 12:16

## 2024-02-24 RX ADMIN — GADOTERATE MEGLUMINE 18 ML: 376.9 INJECTION INTRAVENOUS at 13:50

## 2024-02-24 RX ADMIN — LEVOTHYROXINE SODIUM 100 MCG: 100 TABLET ORAL at 05:20

## 2024-02-24 RX ADMIN — POTASSIUM CHLORIDE 20 MEQ: 1.5 POWDER, FOR SOLUTION ORAL at 14:33

## 2024-02-24 RX ADMIN — ENOXAPARIN SODIUM 40 MG: 40 INJECTION SUBCUTANEOUS at 05:20

## 2024-02-24 RX ADMIN — Medication 1000 UNITS: at 14:33

## 2024-02-24 RX ADMIN — Medication 400 MG: at 14:33

## 2024-02-24 RX ADMIN — DEXAMETHASONE 4 MG: 4 TABLET ORAL at 05:20

## 2024-02-24 RX ADMIN — SODIUM CHLORIDE 100 ML/HR: 9 INJECTION, SOLUTION INTRAVENOUS at 05:20

## 2024-02-24 RX ADMIN — DEXAMETHASONE 4 MG: 4 TABLET ORAL at 14:33

## 2024-02-24 RX ADMIN — PROCHLORPERAZINE EDISYLATE 10 MG: 5 INJECTION INTRAMUSCULAR; INTRAVENOUS at 12:16

## 2024-02-24 RX ADMIN — PAROXETINE 10 MG: 10 TABLET, FILM COATED ORAL at 14:33

## 2024-02-24 NOTE — DISCHARGE SUMMARY
Discharge Diagnosis  Vertigo    Issues Requiring Follow-Up  PCP, alcohol use, neurosurgery    Discharge Meds     Your medication list        START taking these medications        Instructions Last Dose Given Next Dose Due   dexAMETHasone 4 mg tablet  Commonly known as: Decadron      Take 1 tablet (4 mg) by mouth 4 times a day.              CONTINUE taking these medications        Instructions Last Dose Given Next Dose Due   amLODIPine 5 mg tablet  Commonly known as: Norvasc           calcium carbonate-vitamin D3 1,000 mg-20 mcg (800 unit) tablet           Centrum Silver Women 8 mg iron-400 mcg-50 mcg tablet  Generic drug: multivit-min-iron-FA-vit K-lut           chlorthalidone 25 mg tablet  Commonly known as: Hygroton           cholecalciferol 25 MCG (1000 UT) capsule  Commonly known as: Vitamin D-3           hydroCHLOROthiazide 25 mg tablet  Commonly known as: HYDRODiuril           Kisqali 3 x 200 mg tablet therapy pack  Generic drug: ribociclib      Take 3 tablets (600 mg total) by mouth in the morning for 21 days on, followed by 7 days off per 28-day treatment cycle. Repeat cycle every 28 days.       levothyroxine 100 mcg tablet  Commonly known as: Synthroid, Levoxyl           LORazepam 1 mg tablet  Commonly known as: Ativan      Take 1 tablet (1 mg) by mouth see administration instructions. Take 1 tablet 30 mins before MRCP. May repeat x once       magnesium oxide 400 mg (241.3 mg magnesium) tablet  Commonly known as: Mag-Ox           NON FORMULARY           ondansetron 8 mg tablet  Commonly known as: Zofran           PaxiL 10 mg tablet  Generic drug: PARoxetine           potassium chloride CR 20 mEq ER tablet  Commonly known as: Klor-Con M20                  STOP taking these medications      codeine-guaifenesin  mg/5 mL syrup  Commonly known as: Robitussin-AC                  Where to Get Your Medications        These medications were sent to [facility]/pharmacy #[id number] - [place], [place] - [address]  [address]  [address], [facility] [address]      Hours: 24-hours Phone: [phone]   dexAMETHasone 4 mg tablet         Test Results Pending At Discharge  Pending Labs       No current pending labs.            Hospital Course   Chio Turner is a 63 y.o. 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  She felt well when she woke up in the morning took her medications and then began feeling lightheaded and unsteady on her feet.  She was concerned that she was having vertigo so she attempted vestibular Exercises but her symptoms did not improve.  She then developed nausea with recurrent emesis.  She tried Zofran without improvement. She called her oncologist who suggested an Ativan.  She had no abdominal pain hematemesis coffee-ground emesis or diarrhea.  She had no headache or visual changes or focal weakness or numbness.  No double vision chest pain shortness of breath diaphoresis or urinary symptoms she describes vertigo and feels like the room was spinning but nothing really improves her symptoms so she came to the ER     Patient was admitted for further management.  Imaging in the ED with CT scan of the head revealed 2 hyperdense cerebral masses the largest 1 measuring 10 x 14 mm, located in the cerebellum vermis and associated with mild edema most consistent with metastatic disease. She was put on dexamethasone 4 mg p.o. 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    Her  symptoms of dizziness started to improve on hospital day 2, as well as her unsteady gait.  Hospital course notable for hypokalemia, patient on daily potassium supplement as KCl packets, on day of discharge at last a.m. lab revealed a K of 3.4 additional potassium packet was ordered.  Patient will need follow-up lab to assess hypokalemia resolution.  Secure chat conducted with her oncologist Dr. Kahn, resulted with recommendation for MRCP due to elevated liver enzymes as well as bilirubin levels.  Patient completed her MRCP today, result pending, but she is stable for discharge home with follow-up with oncologist, appointment scheduled on 2/27 for follow-up to evaluate for gamma knife therapy.  Patient stable for discharge home, with follow-up as above, home health with PT OT ordered.  Prescription for Decadron 4 mg p.o. every 6 hours sent to her pharmacy.    Pertinent Physical Exam At Time of Discharge  Physical Exam  Constitutional: Pleasant, alert active, cooperative not in acute distress  Eyes: PERRLA, clear sclera  ENMT: Moist mucosal membranes, no exudate  Head / Neck: Atraumatic, normocephalic, supple neck, JVP not visualized  Lungs: Patent airways, CTABL  Heart: RRR, S1S2, no murmurs appreciated, palpable pulses in all extremities  GI: Soft, NT, ND, bowel sounds present in all quadrants  MSK: Moves all extremities freely, no restriction  of ROM, no joint edema  Extremities: Intact x 4, no peripheral edema  : No Watson catheter inserted  Breast: Deferred  Neurological: AAO x 3 to person, place and date, facial muscles symmetrical, sensation intact, strength 4/4, no acute focal neurological deficits appreciated  Psychological: Appropriate mood and behavior   Outpatient Follow-Up  Future Appointments   Date Time Provider Department Center   2/27/2024  2:40 PM Alisha Kahn MD [facility]   3/5/2024 11:40 AM [facility] CENTRAL LINE 01 [id number] Academic   3/5/2024 12:20 [facility]   3/5/2024  1:30 PM [facility]         Chas Perez DO

## 2024-02-24 NOTE — CARE PLAN
The patient's goals for the shift include pain controled     The clinical goals for the shift include patient will remain hemodynamically stable this shift    Problem: Nutrition  Goal: Less than 5 days NPO/clear liquids  Outcome: Progressing  Goal: Oral intake greater than 50%  Outcome: Progressing  Goal: Oral intake greater 75%  Outcome: Progressing  Goal: Consume prescribed supplement  Outcome: Progressing  Goal: Adequate PO fluid intake  Outcome: Progressing  Goal: Nutrition support goals are met within 48 hrs  Outcome: Progressing  Goal: Nutrition support is meeting 75% of nutrient needs  Outcome: Progressing  Goal: Lab values WNL  Outcome: Progressing  Goal: Electrolytes WNL  Outcome: Progressing  Goal: Promote healing  Outcome: Progressing  Goal: Maintain stable weight  Outcome: Progressing  Goal: Reduce weight from edema/fluid  Outcome: Progressing  Goal: Gradual weight gain  Outcome: Progressing  Goal: Improve ostomy output  Outcome: Progressing     Problem: Pain - Adult  Goal: Verbalizes/displays adequate comfort level or baseline comfort level  Outcome: Progressing     Problem: Safety - Adult  Goal: Free from fall injury  Outcome: Progressing     Problem: Discharge Planning  Goal: Discharge to home or other facility with appropriate resources  Outcome: Progressing     Problem: Chronic Conditions and Co-morbidities  Goal: Patient's chronic conditions and co-morbidity symptoms are monitored and maintained or improved  Outcome: Progressing     Problem: Fall/Injury  Goal: Not fall by end of shift  Outcome: Progressing  Goal: Be free from injury by end of the shift  Outcome: Progressing  Goal: Verbalize understanding of personal risk factors for fall in the hospital  Outcome: Progressing  Goal: Verbalize understanding of risk factor reduction measures to prevent injury from fall in the home  Outcome: Progressing  Goal: Use assistive devices by end of the shift  Outcome: Progressing  Goal: Pace activities to  prevent fatigue by end of the shift  Outcome: Progressing

## 2024-02-26 DIAGNOSIS — Z17.0 MALIGNANT NEOPLASM OF OVERLAPPING SITES OF BREAST IN FEMALE, ESTROGEN RECEPTOR POSITIVE, UNSPECIFIED LATERALITY (MULTI): Primary | ICD-10-CM

## 2024-02-26 DIAGNOSIS — C50.819 MALIGNANT NEOPLASM OF OVERLAPPING SITES OF BREAST IN FEMALE, ESTROGEN RECEPTOR POSITIVE, UNSPECIFIED LATERALITY (MULTI): Primary | ICD-10-CM

## 2024-02-27 ENCOUNTER — TELEMEDICINE (OUTPATIENT)
Dept: HEMATOLOGY/ONCOLOGY | Facility: HOSPITAL | Age: 64
End: 2024-02-27
Payer: MEDICARE

## 2024-02-27 DIAGNOSIS — C50.919 MALIGNANT NEOPLASM OF FEMALE BREAST, UNSPECIFIED ESTROGEN RECEPTOR STATUS, UNSPECIFIED LATERALITY, UNSPECIFIED SITE OF BREAST (MULTI): Primary | ICD-10-CM

## 2024-02-27 DIAGNOSIS — C50.819 OVERLAPPING MALIGNANT NEOPLASM OF FEMALE BREAST, UNSPECIFIED ESTROGEN RECEPTOR STATUS, UNSPECIFIED LATERALITY (MULTI): ICD-10-CM

## 2024-02-27 PROCEDURE — 1159F MED LIST DOCD IN RCRD: CPT | Performed by: INTERNAL MEDICINE

## 2024-02-27 PROCEDURE — 1036F TOBACCO NON-USER: CPT | Performed by: INTERNAL MEDICINE

## 2024-02-27 PROCEDURE — 1126F AMNT PAIN NOTED NONE PRSNT: CPT | Performed by: INTERNAL MEDICINE

## 2024-02-27 PROCEDURE — 1111F DSCHRG MED/CURRENT MED MERGE: CPT | Performed by: INTERNAL MEDICINE

## 2024-02-27 PROCEDURE — 99215 OFFICE O/P EST HI 40 MIN: CPT | Performed by: INTERNAL MEDICINE

## 2024-02-27 RX ORDER — OLANZAPINE 5 MG/1
5 TABLET ORAL NIGHTLY
Qty: 8 TABLET | Refills: 5 | Status: SHIPPED | OUTPATIENT
Start: 2024-02-27 | End: 2024-03-13 | Stop reason: HOSPADM

## 2024-02-27 RX ORDER — ONDANSETRON HYDROCHLORIDE 8 MG/1
8 TABLET, FILM COATED ORAL EVERY 8 HOURS PRN
Qty: 30 TABLET | Refills: 5 | Status: SHIPPED | OUTPATIENT
Start: 2024-02-27 | End: 2024-02-28 | Stop reason: SDUPTHER

## 2024-02-27 RX ORDER — PROCHLORPERAZINE MALEATE 10 MG
10 TABLET ORAL EVERY 6 HOURS PRN
Qty: 30 TABLET | Refills: 5 | Status: SHIPPED | OUTPATIENT
Start: 2024-02-27 | End: 2024-02-28 | Stop reason: SDUPTHER

## 2024-02-27 RX ORDER — DEXAMETHASONE 4 MG/1
8 TABLET ORAL DAILY
Qty: 12 TABLET | Refills: 5 | Status: SHIPPED | OUTPATIENT
Start: 2024-02-27 | End: 2024-03-13 | Stop reason: HOSPADM

## 2024-02-27 RX ORDER — LOPERAMIDE HYDROCHLORIDE 2 MG/1
CAPSULE ORAL
Qty: 100 CAPSULE | Refills: 2 | Status: SHIPPED | OUTPATIENT
Start: 2024-02-27

## 2024-02-27 NOTE — PROGRESS NOTES
Patient Visit Information:   Visit Type: Follow Up Visit      Cancer History:          Breast         AJCC Edition: 7th (AJCC), Diagnosis Date: 05-Jun-2015, IV, T2 N1 M1      Treatment Synopsis:    63-year-old postmenopausal  female with stage IV multifocal right-sided invasive ductal carcinoma with metastatic disease to the lungs bilaterally, liver and brain     The patient's breast cancer was diagnosed on June 5, 2015, and is estrogen receptor positive at 90%, progesterone receptor positive at 70%, and HER-2/jordyn negative with neuroendocrine features. Staging CT at the time of initial diagnosis showed bilateral pulmonary nodules. Nodules were biospy-proven (07/01/2015)  to be consistent with metastatic mammary carcinoma with neuroendocrine features, ER 99%, TX 85%, and her2 negative (1+). Developed progression in liver. Liver biopsy  completed 12/26/2023 revealed metastatic mammary carcinoma with neuroendocrine features, ER+ 95%, TX positive 85% and Her2 negative 1+.     [facility] TESTING:      This revealed [id number] mutation; CCND1 amplification, [id number]; and [id number]     CURRENT THERAPY: Ribociclib and Faslodex        Treatment History:    2015-7-1: Cancer Staging: Lung biopsy: Metastatic mammary carcinoma with neuroendocrine features, ER 99%, TX 85%, and her2 negative.  2015-7-8: New Treatment Plan: Initiated on Letrozole 2.5mg by mouth daily plus pablociclib 125 mg by mouth daily 3 weeks on, one week off.  2018-8-10: New Treatment Plan: Letrozole discontinued. Patient started on Faslodex plus Ibrance due to slight disease progression in right breast  2019-1-16: Cancer Related Surgery: S/p bilateral mastectomy with right ALND. Pathology revealed 3.0cm of invasive ductal carcinoma, with 1/2 SLNs positive and 1/5 ALNs positive.  2019-4-19: New Treatment Plan: Faslodex discontinued. Letrozole restarted. Will continue on Ibrance  2020-9-11: New Treatment Plan: Faslodex resumed due to  progression in lung. Letrozole discontinued. Will remain on Ibrance  2022-7-2: New Treatment Plan: Started on Afinitor and Exemestane. Ibrance and Faslodex discontinued due to disease progression  2022-9-16: Disease Assessment: Interval increase in thoracic tumor burden  2022-10-7: New Treatment Plan: Started on Capecitabine 2000mg by mouth BID  2023-2-10: New treatment started with FAM-Trastuzumab Deruxtecan due to disease progression  2023-15-12: Patient underwent restaging scans. This showed disease progression in lung and liver with redemonstration of several bilateral pulmonary nodules/masses stable to increased in size since 09/01/2023, concerning for progressive metastatic disease and interval development of several hypoattenuating, some subtle, hepatic lesions as detailed above, concerning for metastatic disease.  12/26/2023: Patient underwent liver biopsy. This revealed metastatic mammry carcinoma with neuroendocrine features, ER+ 95%, ID positive 85% and Her2 negative 1+  1/12/2024: Received the last dose of Enhertu. Discontinued due to disease progression  02/01/2024: Switched to Ribociclib and Faslodex. This regimen selected because [facility] confirms CCND1 amplification and patient was stable on Ibrance for 5 years from 1272-1269 and has thus far been the most effective regimen for patient.  02/24/2024: Patient completed MRI of the brain which showed hemorrhagic lesions in the cerebellar vermis and right parietal lobe adjacent to the splenium of the corpus callosum along with a few small enhancing cerebellar lesions are nonspecific.   02/24/2024: MRCP showed innumerable liver lesions     History of Present Illness:      ID Statement:    TJ CROCKETT is a 63 year old Female        Chief Complaint: Here for a follow-up visit after a recent hospitalization       Interval History:       INTERVAL HISTORY     Ms. Yue Barroso comes in for a follow-up votual visit. She was hospitalized at [facility] from  02/21-02/24 with complaints fo dizziness, imbalance , nausea and vomiting. Brain imaging unfortunately showed metastatic brain lesions, Due to abnormal LFTs she had been on track to complete outpatient MRCP. So this was completed during her inpatient hospitalization which unfortunately showed innumerable liver metastatic disease.    She reports that her dizziness and n/v have improved but still feels imbalanced and feels uncomfortable going out.     Last set of restaging scans were completed on  12/15/2023. This showed disease progression in lung and liver.  As a result a liver biopsy was completed on 12/26/2023. This revealed metastatic mammary carcinoma with neuroendocrine features, ER+ 95%, [place] positive 85% and Her2 negative 1+. Results and treatment options were discussed with patient and her .      Allergies and Intolerances:       Allergies:         penicillin: Drug, Hives/Urticaria, Active     Outpatient Medication Profile:  *     Current Outpatient Medications:     amLODIPine (Norvasc) 5 mg tablet, Take 1 tablet (5 mg) by mouth once daily., Disp: , Rfl:     calcium carbonate-vitamin D3 1,000 mg-20 mcg (800 unit) tablet, Take by mouth 2 times a day., Disp: , Rfl:     chlorthalidone (Hygroton) 25 mg tablet, Take 1 tablet (25 mg) by mouth once daily., Disp: , Rfl:     cholecalciferol (Vitamin D-3) 25 MCG (1000 UT) capsule, Take 1 capsule (25 mcg) by mouth once daily., Disp: , Rfl:     hydroCHLOROthiazide (HYDRODiuril) 25 mg tablet, Take 1 tablet (25 mg) by mouth once daily., Disp: , Rfl:     levothyroxine (Synthroid, Levoxyl) 100 mcg tablet, Take 1 tablet (100 mcg) by mouth once daily in the morning. Take before meals., Disp: , Rfl:     magnesium oxide (Mag-Ox) 400 mg (241.3 mg magnesium) tablet, Take 1 tablet (400 mg) by mouth once daily., Disp: , Rfl:     multivit-min-iron-FA-vit K-lut (Centrum Silver Women) 8 mg iron-400 mcg-50 mcg tablet, Take 1 tablet by mouth once daily., Disp: , Rfl:     NON  "FORMULARY, Take 1 each by mouth 2 times a day.  Turmeric Curcumin Oral Capsule, Disp: , Rfl:     ondansetron (Zofran) 8 mg tablet, Take 1 tablet (8 mg) by mouth every 8 hours if needed., Disp: , Rfl:     PaxiL 10 mg tablet, Take 1 tablet (10 mg) by mouth once daily., Disp: , Rfl:     levothyroxine (Synthroid, Levoxyl) 88 mcg tablet, , Disp: , Rfl:     multivit-minerals/folic acid (CENTRUM ADULTS ORAL), Take 1 tablet by mouth once daily., Disp: , Rfl:     potassium chloride (Klor-Con) 20 mEq packet, Take 20 mEq by mouth once daily., Disp: , Rfl:     potassium chloride CR 20 mEq ER tablet, Take 2 tablets (40 mEq) by mouth twice a day., Disp: , Rfl:   No current facility-administered medications for this visit.      Medical History:         High risk medication use: ICD-10: Z79.899, Status: Active         Breast cancer: ICD-10: C50.919, Status: Active         Recurrent Clostridium difficile diarrhea: ICD-10: A04.7, Status:  Active         Hypokalemia: ICD-10: E87.6, Status: Active         Malignant neoplasm of breast: ICD-10: C50.919, Status: Active         Social History:   Social Substance History:  ·  Smoking Status never smoker (1)   ·  Additional History     Breast Cancer Risk Factors:  , Had her menarche at age 10 years,  menopause 52 , used BCP x 2 years but never used HRT     Family History   Mother had ovarian cancer - age 65- BRCA negative s/p b/l salpingo oopherectomy.  Father  had colon cancer- age 56,  at 59 of metastatic disease.     Social History    Never smoker , social alcohol use  Active , exercises regularly, administrative job.        Performance:   ECOG Performance Status: 0 Fully Active         Vitals and Measurements:   Visit Vitals  /84   Pulse 99   Temp 36.9 °C (98.4 °F) (Temporal)   Resp 20   Ht 1.626 m (5' 4.02\")   Wt 84.9 kg (187 lb 2.7 oz)   SpO2 94%   BMI 32.11 kg/m²   Smoking Status Never   BSA 1.96 m²            Physical Exam:      Constitutional: AAOx3. appears " comfortable.   Eyes: B/l GEAL. EOMI   ENMT: No oral ulcers or thrush. No pharyngeal congestion.   Head/Neck: No thyromegaly/JVD.   Respiratory/Thorax: B/l Air entry equal. No added  sounds.   Cardiovascular: S1s2+. No murmur/rub/Gallop   Gastrointestinal: Soft, Non tender. No hepatosplenomegaly.  BS+.   Extremities: No pedal edema.   Neurological: No focal motor deficits.   Breast: B/l s/p mastectomy, Lumpiness felt-stable  since last clinic visit.  No lymphedema   Lymphatic: No significant lymphadenopathy   Psychological: Appropriate mood and behavior   Skin: No rash/petechiae         Lab Results:     Lab Results   Component Value Date    WBC 4.4 02/24/2024    HGB 8.0 (L) 02/24/2024    HCT 24.4 (L) 02/24/2024    MCV 88 02/24/2024    PLT 94 (L) 02/24/2024        Lab Results   Component Value Date    NEUTROABS 2.91 02/21/2024          Chemistry    Lab Results   Component Value Date/Time     02/24/2024 0527    K 3.4 (L) 02/24/2024 0527     02/24/2024 0527    CO2 29 02/24/2024 0527    BUN 17 02/24/2024 0527    CREATININE 0.75 02/24/2024 0527    Lab Results   Component Value Date/Time    CALCIUM 9.0 02/24/2024 0527    ALKPHOS 182 (H) 02/24/2024 0527    AST 75 (H) 02/24/2024 0527    ALT 62 (H) 02/24/2024 0527    BILITOT 1.0 02/24/2024 0527                     Radiology Result:   MR brain w and wo IV contrast  Status: Final result     PACS Images     Show images for MR brain w and wo IV contrast  Signed by    Signed Time Phone Pager   Tree Sin MD 2/23/2024 16:34 [phone]     Exam Information    Status Exam Begun Exam Ended   Final 2/23/2024 15:31 2/23/2024 16:03     Study Result    Narrative & Impression   Interpreted By:  Tree Sin,  and Tamanna Lam   STUDY:  MR BRAIN W AND WO IV CONTRAST;  2/23/2024 4:03 pm      INDICATION:  Signs/Symptoms:new brain mets.      COMPARISON:  CTA head and neck dated 02/21/2024      ACCESSION NUMBER(S):  [id number]      ORDERING  CLINICIAN:  BONNIE LACY      TECHNIQUE:  Axial T2, FLAIR, DWI, gradient echo T2 and sagittal and coronal T1  weighted images of brain were acquired. Post contrast T1 weighted  images were acquired after administration of 18 mL Dotarem gadolinium  based intravenous contrast.      FINDINGS:  CSF Spaces: The ventricles, sulci and basal cisterns are within  normal limits. No abnormal extra-axial fluid collection.      Parenchyma: There is a 1.3 x 1.2 cm lesion centered on the cerebellar  vermis corresponding with the hyperdense lesion seen on prior CT. It  demonstrates T2 and FLAIR hypointensity, blooming artifact on  gradient echo imaging, and heterogeneous appearance on T1 weighted  imaging. No definite enhancement is seen on the postcontrast imaging.  A 2nd lesion with similar imaging characteristics is seen in the  parietal lobe adjacent to the splenium of the corpus callosum. There  is a thin rim of FLAIR hyperintensity surrounding both lesions,  consistent with edema. There is questionable subtle enhancement  associated with the smaller lesion (series [id number], image 183).  Additionally there is a 4 mm enhancing lesion in the superior aspect  of the right cerebellum (Image 227) and a 3 mm focus of enhancement  within the superior aspect of the left cerebellar hemisphere (Image  221). There is no diffusion restriction abnormality to suggest acute  infarct. There is no mass effect or midline shift.      Paranasal Sinuses and Mastoids: Visualized paranasal sinuses and  mastoid air cells are unremarkable.      IMPRESSION:  Hemorrhagic lesions in the cerebellar vermis and right parietal lobe  adjacent to the splenium of the corpus callosum along with a few  small enhancing cerebellar lesions are nonspecific. Consider  hemorrhagic metastases as well as hemorrhages due to slow flow  vascular malformations.      I personally reviewed the images/study and I agree with the findings  as stated by resident physician Dr. Hargrove  Tamanna.      MACRO:  None      Signed by: Tree Sin 2/23/2024 4:34 PM  Dictation workstation:   [id number]       MRCP pancreas w and wo IV contrast  Status: Final result     PACS Images     Show images for MRCP pancreas w and wo IV contrast  Signed by    Signed Time Phone Pager   Gunner Worthington MD 2/26/2024 09:00 [phone]     Exam Information    Status Exam Begun Exam Ended   Final 2/24/2024 13:20 2/24/2024 14:02     Study Result    Narrative & Impression   Interpreted By:  Gunner Worthington,   STUDY:  MRCP PANCREAS W AND WO IV CONTRAST;  2/24/2024 2:02 pm      INDICATION:  Signs/Symptoms:Rising bilirubin and LFTs in the setting of metastatic  breast cancer.      COMPARISON:  CT chest with contrast 16 February 2024; CT chest, abdomen and pelvis  with contrast 15 December 2023; 15 other, older CTs back through the  oldest available, 1 October 2015      ACCESSION NUMBER(S):  [id number]      ORDERING CLINICIAN:  TIFFANY LYONS      TECHNIQUE:  Multi-planar, multi-pulse sequence MRI abdomen before and after the  uneventful administration of 18 mL gadolinium-based intravenous  contrast material (Dotarem), including MRCP with coronal thick slab  volume-rendered reformatted images.      Three dimensional maximum intensity projection (3-D MIPs) image/s  were created on a separate dedicated workstation, reviewed and saved      FINDINGS:  PANCREAS:  Acute inflammatory change: Negative  Morphologic changes of chronic pancreatitis: Negative  Gland necrosis: Negative  Peripancreatic collection: Negative  Peripancreatic fat necrosis: Negative  Main duct dilation: Negative  Pancreas divisum: Negative  Solid mass: Negative  Cyst / cystic lesion: Negative      LIVER:  Size:  Within normal limits  Cirrhotic change:  Negative  Fatty change:  Perhaps mild  Significant iron deposition:  Negative  Solid mass:  On CT chest, abdomen and pelvis with contrast 15  December 2023, there were just a few liver lesions; today,  too  numerous to count, probably around 100 or more metastases Other:  n/a      GALLBLADDER:  Stone/s: Negative  Polyp: Negative  Dilation: Negative  Wall thickening: Negative  Other: Edematous wall thickening probably related to the metastatic  burden in the liver      BILE DUCTS:  Intrahepatic dilation: Negative  Extrahepatic dilation: Negative  Stricture: Negative  Stone/s: Negative  Other: n/a      SPLEEN:  The spleen is normal in size, without focal lesion.      RIGHT ADRENAL GLAND:  Unchanged myelolipoma      LEFT ADRENAL GLAND:  No mass or hyperplasia.      RIGHT KIDNEY AND INCLUDED URETER:  No hydronephrosis, solid mass or  any other acute findings.      LEFT KIDNEY AND INCLUDED URETER:  No hydronephrosis, solid mass or  any other acute findings.      LYMPH NODES:  No abdominal adenopathy, intraperitoneal,  retroperitoneal or otherwise.      INCLUDED BOWEL:  Normal in caliber and wall thickness.      RETROPERITONEUM:  Normal.  No hemorrhage or inflammatory change.  (lymph nodes in dedicated section)      OMENTUM, MESENTERY AND PERITONEAL SPACES:  No fluid collection or  free fluid.  Grossly normal omentum and mesentery.  (lymph nodes in  dedicated section)      VASCULATURE:  No abdominal aortic aneurysm.  No large vessel  occlusion or critical stenosis.  The portal venous system is patent.      LOWER CHEST:  Large right lower lobe mass grossly similar to CT chest  16 February 2024. There were additional, smaller nodules in the lung  bases at that time, some of which are evident on this MRI, others not  due to the differences between MRI and CT regarding lung nodules          BONES:  No acute pathologic fracture      IMPRESSION:  On the most recent comparison exam of the abdomen and pelvis, CT with  contrast 15 December 2023, there were just a few liver lesions  suspect for metastases; today, too numerous to count new variably  sized liver metastases, likely 100 or more      No other contributory  abnormalities      No biliary duct dilation      No choledocholithiasis      No pancreatic duct dilation      Edematous gallbladder wall thickening, probably due to hepatic  dysfunction. No gallstones or gallbladder dilation      Away from the liver, no other metastatic disease in the abdomen      No stigmata of carcinomatosis such as free fluid or omental or  peritoneal implants      Known right adrenal myelolipoma      MACRO:  None      Signed by: Gunner Worthington 2/26/2024 9:00 AM  Dictation workstation:   [id number]              Assessment and Plan:   Assessment:    63 year old post menopausal woman with stage IV breast cancer, invasive ductal carcinoma, ER/PA positive, HER 2 negative. s/p bilateral mastectomies in 01/2019.       She comes in for a follow-up vrtual visit. She was hospitalized at [facility] from 02/21-02/24 with complaints fo dizziness, imbalance , nausea and vomiting. Brain imaging unfortunately showed metastatic brain lesions, Due to abnormal LFTs she had been on track to complete outpatient MRCP. So this was completed during her inpatient hospitalization which unfortunately showed innumerable liver metastatic disease.    She reports that her dizziness and n/v have improved but still feels imbalanced and feels uncomfortable going out.     Given new brain metastatic disease and visceral crisis we will proceed with Sacituzumab which has CNS penetrance. This will need to wait for neurosurgery evaluation and determination of surgical intervention or not. Neurosurgery appointment I scheduled for 3/5 and therefore patient will be scheduled for treatment on 3/8 assuming surgical intervention is not planned. She will also see radiation oncology for gamma knife consideration.    Side effects that were discussed included but were not limited to myelosuppression, febrile neutropenia, nausea, vomiting diarrhea, dehydration, acute hypersensitivity reaction, alopecia, peripheral neuropathy, liver and kidney  toxicity, cardiotoxicity,  other malignancies and peripheral edema.     After much deliberation patient agreed to proceed with treatment. Informed consent was signed.     Plan:      Given significant liver progression and brain metastatic disease will start patient on Sacituzumab. Consent signed.   MRCP STAT. Done. Innumerable liver mets  Repeat LFTs. Now near normal  Neurosurgery and radiation oncology consults are pending  First dose Sacituzumab on 03/08/2024.   MD visit 03/05/2024

## 2024-02-28 ENCOUNTER — TELEPHONE (OUTPATIENT)
Dept: HEMATOLOGY/ONCOLOGY | Facility: HOSPITAL | Age: 64
End: 2024-02-28

## 2024-02-28 DIAGNOSIS — C50.919 MALIGNANT NEOPLASM OF FEMALE BREAST, UNSPECIFIED ESTROGEN RECEPTOR STATUS, UNSPECIFIED LATERALITY, UNSPECIFIED SITE OF BREAST (MULTI): Primary | ICD-10-CM

## 2024-02-28 RX ORDER — PROCHLORPERAZINE MALEATE 10 MG
10 TABLET ORAL EVERY 6 HOURS PRN
Qty: 30 TABLET | Refills: 5 | Status: SHIPPED | OUTPATIENT
Start: 2024-02-28

## 2024-02-28 RX ORDER — ONDANSETRON HYDROCHLORIDE 8 MG/1
8 TABLET, FILM COATED ORAL EVERY 8 HOURS PRN
Qty: 30 TABLET | Refills: 5 | Status: SHIPPED | OUTPATIENT
Start: 2024-02-28

## 2024-03-04 ENCOUNTER — APPOINTMENT (OUTPATIENT)
Dept: NEUROSURGERY | Facility: HOSPITAL | Age: 64
End: 2024-03-04
Payer: COMMERCIAL

## 2024-03-05 ENCOUNTER — APPOINTMENT (OUTPATIENT)
Dept: HEMATOLOGY/ONCOLOGY | Facility: HOSPITAL | Age: 64
DRG: 436 | End: 2024-03-05
Payer: MEDICARE

## 2024-03-05 ENCOUNTER — APPOINTMENT (OUTPATIENT)
Dept: INFUSION THERAPY | Facility: HOSPITAL | Age: 64
End: 2024-03-05
Payer: MEDICARE

## 2024-03-05 VITALS
OXYGEN SATURATION: 95 % | HEIGHT: 64 IN | DIASTOLIC BLOOD PRESSURE: 93 MMHG | RESPIRATION RATE: 16 BRPM | SYSTOLIC BLOOD PRESSURE: 160 MMHG | WEIGHT: 177.47 LBS | TEMPERATURE: 97 F | BODY MASS INDEX: 30.3 KG/M2 | HEART RATE: 96 BPM

## 2024-03-05 DIAGNOSIS — C50.819 OVERLAPPING MALIGNANT NEOPLASM OF FEMALE BREAST, UNSPECIFIED ESTROGEN RECEPTOR STATUS, UNSPECIFIED LATERALITY (MULTI): Primary | ICD-10-CM

## 2024-03-05 DIAGNOSIS — T45.1X5S ADVERSE EFFECT OF ANTINEOPLASTIC AND IMMUNOSUPPRESSIVE DRUGS, SEQUELA: ICD-10-CM

## 2024-03-05 DIAGNOSIS — C50.919 MALIGNANT NEOPLASM OF FEMALE BREAST, UNSPECIFIED ESTROGEN RECEPTOR STATUS, UNSPECIFIED LATERALITY, UNSPECIFIED SITE OF BREAST (MULTI): ICD-10-CM

## 2024-03-05 LAB
ALBUMIN SERPL BCP-MCNC: 3.4 G/DL (ref 3.4–5)
ALP SERPL-CCNC: 395 U/L (ref 33–136)
ALT SERPL W P-5'-P-CCNC: 323 U/L (ref 7–45)
ANION GAP SERPL CALC-SCNC: 14 MMOL/L (ref 10–20)
AST SERPL W P-5'-P-CCNC: 200 U/L (ref 9–39)
BASO STIPL BLD QL SMEAR: PRESENT
BASOPHILS # BLD AUTO: 0.01 X10*3/UL (ref 0–0.1)
BASOPHILS NFR BLD AUTO: 0.1 %
BILIRUB SERPL-MCNC: 2.8 MG/DL (ref 0–1.2)
BUN SERPL-MCNC: 30 MG/DL (ref 6–23)
CALCIUM SERPL-MCNC: 9.5 MG/DL (ref 8.6–10.3)
CHLORIDE SERPL-SCNC: 94 MMOL/L (ref 98–107)
CO2 SERPL-SCNC: 28 MMOL/L (ref 21–32)
CREAT SERPL-MCNC: 0.57 MG/DL (ref 0.5–1.05)
DACRYOCYTES BLD QL SMEAR: NORMAL
EGFRCR SERPLBLD CKD-EPI 2021: >90 ML/MIN/1.73M*2
EOSINOPHIL # BLD AUTO: 0 X10*3/UL (ref 0–0.7)
EOSINOPHIL NFR BLD AUTO: 0 %
ERYTHROCYTE [DISTWIDTH] IN BLOOD BY AUTOMATED COUNT: 25.6 % (ref 11.5–14.5)
GLUCOSE SERPL-MCNC: 318 MG/DL (ref 74–99)
HCT VFR BLD AUTO: 36.2 % (ref 36–46)
HGB BLD-MCNC: 12.5 G/DL (ref 12–16)
IMM GRANULOCYTES # BLD AUTO: 0.22 X10*3/UL (ref 0–0.7)
IMM GRANULOCYTES NFR BLD AUTO: 1.3 % (ref 0–0.9)
LYMPHOCYTES # BLD AUTO: 0.46 X10*3/UL (ref 1.2–4.8)
LYMPHOCYTES NFR BLD AUTO: 2.7 %
MCH RBC QN AUTO: 28.2 PG (ref 26–34)
MCHC RBC AUTO-ENTMCNC: 34.5 G/DL (ref 32–36)
MCV RBC AUTO: 82 FL (ref 80–100)
MONOCYTES # BLD AUTO: 1.91 X10*3/UL (ref 0.1–1)
MONOCYTES NFR BLD AUTO: 11.2 %
NEUTROPHILS # BLD AUTO: 14.38 X10*3/UL (ref 1.2–7.7)
NEUTROPHILS NFR BLD AUTO: 84.7 %
NRBC BLD-RTO: 0.4 /100 WBCS (ref 0–0)
OVALOCYTES BLD QL SMEAR: NORMAL
PLATELET # BLD AUTO: 230 X10*3/UL (ref 150–450)
POLYCHROMASIA BLD QL SMEAR: NORMAL
POTASSIUM SERPL-SCNC: 4.2 MMOL/L (ref 3.5–5.3)
PROT SERPL-MCNC: 6.3 G/DL (ref 6.4–8.2)
RBC # BLD AUTO: 4.44 X10*6/UL (ref 4–5.2)
RBC MORPH BLD: NORMAL
SCHISTOCYTES BLD QL SMEAR: NORMAL
SODIUM SERPL-SCNC: 132 MMOL/L (ref 136–145)
WBC # BLD AUTO: 17 X10*3/UL (ref 4.4–11.3)

## 2024-03-05 PROCEDURE — 3080F DIAST BP >= 90 MM HG: CPT | Performed by: INTERNAL MEDICINE

## 2024-03-05 PROCEDURE — 80053 COMPREHEN METABOLIC PANEL: CPT

## 2024-03-05 PROCEDURE — 36591 DRAW BLOOD OFF VENOUS DEVICE: CPT

## 2024-03-05 PROCEDURE — 2500000004 HC RX 250 GENERAL PHARMACY W/ HCPCS (ALT 636 FOR OP/ED): Performed by: PHARMACIST

## 2024-03-05 PROCEDURE — 99215 OFFICE O/P EST HI 40 MIN: CPT | Performed by: INTERNAL MEDICINE

## 2024-03-05 PROCEDURE — 85025 COMPLETE CBC W/AUTO DIFF WBC: CPT

## 2024-03-05 PROCEDURE — 1126F AMNT PAIN NOTED NONE PRSNT: CPT | Performed by: INTERNAL MEDICINE

## 2024-03-05 PROCEDURE — 1111F DSCHRG MED/CURRENT MED MERGE: CPT | Performed by: INTERNAL MEDICINE

## 2024-03-05 PROCEDURE — 3077F SYST BP >= 140 MM HG: CPT | Performed by: INTERNAL MEDICINE

## 2024-03-05 PROCEDURE — 1036F TOBACCO NON-USER: CPT | Performed by: INTERNAL MEDICINE

## 2024-03-05 PROCEDURE — 1159F MED LIST DOCD IN RCRD: CPT | Performed by: INTERNAL MEDICINE

## 2024-03-05 RX ORDER — HEPARIN SODIUM,PORCINE/PF 10 UNIT/ML
50 SYRINGE (ML) INTRAVENOUS AS NEEDED
Status: CANCELLED | OUTPATIENT
Start: 2024-03-05

## 2024-03-05 RX ORDER — HEPARIN 100 UNIT/ML
500 SYRINGE INTRAVENOUS AS NEEDED
Status: CANCELLED | OUTPATIENT
Start: 2024-03-05

## 2024-03-05 RX ORDER — HEPARIN 100 UNIT/ML
500 SYRINGE INTRAVENOUS AS NEEDED
Status: DISCONTINUED | OUTPATIENT
Start: 2024-03-05 | End: 2024-03-05 | Stop reason: HOSPADM

## 2024-03-05 RX ADMIN — HEPARIN 500 UNITS: 100 SYRINGE at 12:07

## 2024-03-05 ASSESSMENT — PAIN SCALES - GENERAL: PAINLEVEL: 0-NO PAIN

## 2024-03-05 NOTE — PROGRESS NOTES
Patient Visit Information:   Visit Type: Follow Up Visit      Cancer History:          Breast         AJCC Edition: 7th (AJCC), Diagnosis Date: 05-Jun-2015, IV, T2 N1 M1      Treatment Synopsis:    63-year-old postmenopausal  female with stage IV multifocal right-sided invasive ductal carcinoma with metastatic disease to the lungs bilaterally, liver and brain     The patient's breast cancer was diagnosed on June 5, 2015, and is estrogen receptor positive at 90%, progesterone receptor positive at 70%, and HER-2/jordyn negative with neuroendocrine features. Staging CT at the time of initial diagnosis showed bilateral pulmonary nodules. Nodules were biospy-proven (07/01/2015)  to be consistent with metastatic mammary carcinoma with neuroendocrine features, ER 99%, OK 85%, and her2 negative (1+). Developed progression in liver. Liver biopsy  completed 12/26/2023 revealed metastatic mammary carcinoma with neuroendocrine features, ER+ 95%, OK positive 85% and Her2 negative 1+.     [facility] TESTING:      This revealed [id number] mutation; CCND1 amplification, [id number]; and [id number]     CURRENT THERAPY: Ribociclib and Faslodex        Treatment History:    2015-7-1: Cancer Staging: Lung biopsy: Metastatic mammary carcinoma with neuroendocrine features, ER 99%, OK 85%, and her2 negative.  2015-7-8: New Treatment Plan: Initiated on Letrozole 2.5mg by mouth daily plus pablociclib 125 mg by mouth daily 3 weeks on, one week off.  2018-8-10: New Treatment Plan: Letrozole discontinued. Patient started on Faslodex plus Ibrance due to slight disease progression in right breast  2019-1-16: Cancer Related Surgery: S/p bilateral mastectomy with right ALND. Pathology revealed 3.0cm of invasive ductal carcinoma, with 1/2 SLNs positive and 1/5 ALNs positive.  2019-4-19: New Treatment Plan: Faslodex discontinued. Letrozole restarted. Will continue on Ibrance  2020-9-11: New Treatment Plan: Faslodex resumed due to  progression in lung. Letrozole discontinued. Will remain on Ibrance  2022-7-2: New Treatment Plan: Started on Afinitor and Exemestane. Ibrance and Faslodex discontinued due to disease progression  2022-9-16: Disease Assessment: Interval increase in thoracic tumor burden  2022-10-7: New Treatment Plan: Started on Capecitabine 2000mg by mouth BID  2023-2-10: New treatment started with FAM-Trastuzumab Deruxtecan due to disease progression  2023-15-12: Patient underwent restaging scans. This showed disease progression in lung and liver with redemonstration of several bilateral pulmonary nodules/masses stable to increased in size since 09/01/2023, concerning for progressive metastatic disease and interval development of several hypoattenuating, some subtle, hepatic lesions as detailed above, concerning for metastatic disease.  12/26/2023: Patient underwent liver biopsy. This revealed metastatic mammry carcinoma with neuroendocrine features, ER+ 95%, GA positive 85% and Her2 negative 1+  1/12/2024: Received the last dose of Enhertu. Discontinued due to disease progression  02/01/2024: Switched to Ribociclib and Faslodex. This regimen selected because [facility] confirms CCND1 amplification and patient was stable on Ibrance for 5 years from [id number] and has thus far been the most effective regimen for patient.  02/24/2024: Patient completed MRI of the brain which showed hemorrhagic lesions in the cerebellar vermis and right parietal lobe adjacent to the splenium of the corpus callosum along with a few small enhancing cerebellar lesions are nonspecific.   02/24/2024: MRCP showed innumerable liver lesions     History of Present Illness:      ID Statement:    TJ CROCKETT is a 63 year old Female        Chief Complaint: Here for a follow-up visit after a recent hospitalization       Interval History:       INTERVAL HISTORY     Ms. Yue Barroso comes in for a follow-up pre-chemo visit with first dose Sacituzumab scheduled  for 3/5.     She was hospitalized at [facility] from 02/21-02/24 with complaints fo dizziness, imbalance , nausea and vomiting. Brain imaging unfortunately showed metastatic brain lesions. Due to abnormal LFTs she had been on track to complete outpatient MRCP. So this was completed during her inpatient hospitalization which unfortunately showed innumerable liver metastatic disease.     LFTs normalized during that inpatient visit and prior to discharge.  She is here today for a pre-chemo-visit and to recheck LFTs    Allergies and Intolerances:       Allergies:         penicillin: Drug, Hives/Urticaria, Active     Outpatient Medication Profile:  *     Current Outpatient Medications:     amLODIPine (Norvasc) 5 mg tablet, Take 1 tablet (5 mg) by mouth once daily., Disp: , Rfl:     calcium carbonate-vitamin D3 1,000 mg-20 mcg (800 unit) tablet, Take by mouth 2 times a day., Disp: , Rfl:     chlorthalidone (Hygroton) 25 mg tablet, Take 1 tablet (25 mg) by mouth once daily., Disp: , Rfl:     cholecalciferol (Vitamin D-3) 25 MCG (1000 UT) capsule, Take 1 capsule (25 mcg) by mouth once daily., Disp: , Rfl:     hydroCHLOROthiazide (HYDRODiuril) 25 mg tablet, Take 1 tablet (25 mg) by mouth once daily., Disp: , Rfl:     levothyroxine (Synthroid, Levoxyl) 100 mcg tablet, Take 1 tablet (100 mcg) by mouth once daily in the morning. Take before meals., Disp: , Rfl:     magnesium oxide (Mag-Ox) 400 mg (241.3 mg magnesium) tablet, Take 1 tablet (400 mg) by mouth once daily., Disp: , Rfl:     multivit-min-iron-FA-vit K-lut (Centrum Silver Women) 8 mg iron-400 mcg-50 mcg tablet, Take 1 tablet by mouth once daily., Disp: , Rfl:     NON FORMULARY, Take 1 each by mouth 2 times a day.  Turmeric Curcumin Oral Capsule, Disp: , Rfl:     ondansetron (Zofran) 8 mg tablet, Take 1 tablet (8 mg) by mouth every 8 hours if needed., Disp: , Rfl:     PaxiL 10 mg tablet, Take 1 tablet (10 mg) by mouth once daily., Disp: , Rfl:     levothyroxine (Synthroid,  "Levoxyl) 88 mcg tablet, , Disp: , Rfl:     multivit-minerals/folic acid (CENTRUM ADULTS ORAL), Take 1 tablet by mouth once daily., Disp: , Rfl:     potassium chloride (Klor-Con) 20 mEq packet, Take 20 mEq by mouth once daily., Disp: , Rfl:     potassium chloride CR 20 mEq ER tablet, Take 2 tablets (40 mEq) by mouth twice a day., Disp: , Rfl:   No current facility-administered medications for this visit.      Medical History:         High risk medication use: ICD-10: Z79.899, Status: Active         Breast cancer: ICD-10: C50.919, Status: Active         Recurrent Clostridium difficile diarrhea: ICD-10: A04.7, Status:  Active         Hypokalemia: ICD-10: E87.6, Status: Active         Malignant neoplasm of breast: ICD-10: C50.919, Status: Active         Social History:   Social Substance History:  ·  Smoking Status never smoker (1)   ·  Additional History     Breast Cancer Risk Factors:  , Had her menarche at age 10 years,  menopause 52 , used BCP x 2 years but never used HRT     Family History   Mother had ovarian cancer - age 65- BRCA negative s/p b/l salpingo oopherectomy.  Father  had colon cancer- age 56,  at 59 of metastatic disease.     Social History    Never smoker , social alcohol use  Active , exercises regularly, administrative job.        Performance:   ECOG Performance Status: 0 Fully Active         Vitals and Measurements:   Visit Vitals  BP (!) 160/93   Pulse 96   Temp 36.1 °C (97 °F) (Temporal)   Resp 16   Ht 1.626 m (5' 4.02\")   Wt 80.5 kg (177 lb 7.5 oz)   SpO2 95%   BMI 30.45 kg/m²   Smoking Status Never   BSA 1.91 m²               Physical Exam:      Constitutional: AAOx3. appears comfortable.   Eyes: B/l GAEL. EOMI   ENMT: No oral ulcers or thrush. No pharyngeal congestion.   Head/Neck: No thyromegaly/JVD.   Respiratory/Thorax: B/l Air entry equal. No added  sounds.   Cardiovascular: S1s2+. No murmur/rub/Gallop   Gastrointestinal: Soft, Non tender. No hepatosplenomegaly.  BS+. "   Extremities: No pedal edema.   Neurological: No focal motor deficits.   Breast: B/l s/p mastectomy, Lumpiness felt-stable  since last clinic visit.  No lymphedema   Lymphatic: No significant lymphadenopathy   Psychological: Appropriate mood and behavior   Skin: No rash/petechiae         Lab Results:           Lab Results   Component Value Date     WBC 4.4 02/24/2024     HGB 8.0 (L) 02/24/2024     HCT 24.4 (L) 02/24/2024     MCV 88 02/24/2024     PLT 94 (L) 02/24/2024               Lab Results   Component Value Date     NEUTROABS 2.91 02/21/2024           Chemistry           Lab Results   Component Value Date/Time      02/24/2024 0527     K 3.4 (L) 02/24/2024 0527      02/24/2024 0527     CO2 29 02/24/2024 0527     BUN 17 02/24/2024 0527     CREATININE 0.75 02/24/2024 0527          Lab Results   Component Value Date/Time     CALCIUM 9.0 02/24/2024 0527     ALKPHOS 182 (H) 02/24/2024 0527     AST 75 (H) 02/24/2024 0527     ALT 62 (H) 02/24/2024 0527     BILITOT 1.0 02/24/2024 0527                     Radiology Result:   MR brain w and wo IV contrast  Status: Final result      PACS Images      Show images for MR brain w and wo IV contrast  Signed by     Signed Time Phone Pager   Tree Sin MD 2/23/2024 16:34 [phone]      Exam Information     Status Exam Begun Exam Ended   Final 2/23/2024 15:31 2/23/2024 16:03      Study Result     Narrative & Impression   Interpreted By:  Tree Sin,  and Tamanna Lam   STUDY:  MR BRAIN W AND WO IV CONTRAST;  2/23/2024 4:03 pm      INDICATION:  Signs/Symptoms:new brain mets.      COMPARISON:  CTA head and neck dated 02/21/2024      ACCESSION NUMBER(S):  [id number]      ORDERING CLINICIAN:  BONNIE LACY      TECHNIQUE:  Axial T2, FLAIR, DWI, gradient echo T2 and sagittal and coronal T1  weighted images of brain were acquired. Post contrast T1 weighted  images were acquired after administration of 18 mL Dotarem gadolinium  based intravenous  contrast.      FINDINGS:  CSF Spaces: The ventricles, sulci and basal cisterns are within  normal limits. No abnormal extra-axial fluid collection.      Parenchyma: There is a 1.3 x 1.2 cm lesion centered on the cerebellar  vermis corresponding with the hyperdense lesion seen on prior CT. It  demonstrates T2 and FLAIR hypointensity, blooming artifact on  gradient echo imaging, and heterogeneous appearance on T1 weighted  imaging. No definite enhancement is seen on the postcontrast imaging.  A 2nd lesion with similar imaging characteristics is seen in the  parietal lobe adjacent to the splenium of the corpus callosum. There  is a thin rim of FLAIR hyperintensity surrounding both lesions,  consistent with edema. There is questionable subtle enhancement  associated with the smaller lesion (series [id number], image 183).  Additionally there is a 4 mm enhancing lesion in the superior aspect  of the right cerebellum (Image 227) and a 3 mm focus of enhancement  within the superior aspect of the left cerebellar hemisphere (Image  221). There is no diffusion restriction abnormality to suggest acute  infarct. There is no mass effect or midline shift.      Paranasal Sinuses and Mastoids: Visualized paranasal sinuses and  mastoid air cells are unremarkable.      IMPRESSION:  Hemorrhagic lesions in the cerebellar vermis and right parietal lobe  adjacent to the splenium of the corpus callosum along with a few  small enhancing cerebellar lesions are nonspecific. Consider  hemorrhagic metastases as well as hemorrhages due to slow flow  vascular malformations.      I personally reviewed the images/study and I agree with the findings  as stated by resident physician Dr. Delvin Nolen.      MACRO:  None      Signed by: Tree Sin 2/23/2024 4:34 PM  Dictation workstation:   [id number]         MRCP pancreas w and wo IV contrast  Status: Final result      PACS Images      Show images for MRCP pancreas w and wo IV contrast  Signed  by     Signed Time Phone Pager   Gunner Worthington MD 2/26/2024 09:00 [phone]      Exam Information     Status Exam Begun Exam Ended   Final 2/24/2024 13:20 2/24/2024 14:02      Study Result     Narrative & Impression   Interpreted By:  Gunner Worthington,   STUDY:  MRCP PANCREAS W AND WO IV CONTRAST;  2/24/2024 2:02 pm      INDICATION:  Signs/Symptoms:Rising bilirubin and LFTs in the setting of metastatic  breast cancer.      COMPARISON:  CT chest with contrast 16 February 2024; CT chest, abdomen and pelvis  with contrast 15 December 2023; 15 other, older CTs back through the  oldest available, 1 October 2015      ACCESSION NUMBER(S):  [id number]      ORDERING CLINICIAN:  TIFFANY LYONS      TECHNIQUE:  Multi-planar, multi-pulse sequence MRI abdomen before and after the  uneventful administration of 18 mL gadolinium-based intravenous  contrast material (Dotarem), including MRCP with coronal thick slab  volume-rendered reformatted images.      Three dimensional maximum intensity projection (3-D MIPs) image/s  were created on a separate dedicated workstation, reviewed and saved      FINDINGS:  PANCREAS:  Acute inflammatory change: Negative  Morphologic changes of chronic pancreatitis: Negative  Gland necrosis: Negative  Peripancreatic collection: Negative  Peripancreatic fat necrosis: Negative  Main duct dilation: Negative  Pancreas divisum: Negative  Solid mass: Negative  Cyst / cystic lesion: Negative      LIVER:  Size:  Within normal limits  Cirrhotic change:  Negative  Fatty change:  Perhaps mild  Significant iron deposition:  Negative  Solid mass:  On CT chest, abdomen and pelvis with contrast 15  December 2023, there were just a few liver lesions; today, too  numerous to count, probably around 100 or more metastases Other:  n/a      GALLBLADDER:  Stone/s: Negative  Polyp: Negative  Dilation: Negative  Wall thickening: Negative  Other: Edematous wall thickening probably related to the metastatic  burden  in the liver      BILE DUCTS:  Intrahepatic dilation: Negative  Extrahepatic dilation: Negative  Stricture: Negative  Stone/s: Negative  Other: n/a      SPLEEN:  The spleen is normal in size, without focal lesion.      RIGHT ADRENAL GLAND:  Unchanged myelolipoma      LEFT ADRENAL GLAND:  No mass or hyperplasia.      RIGHT KIDNEY AND INCLUDED URETER:  No hydronephrosis, solid mass or  any other acute findings.      LEFT KIDNEY AND INCLUDED URETER:  No hydronephrosis, solid mass or  any other acute findings.      LYMPH NODES:  No abdominal adenopathy, intraperitoneal,  retroperitoneal or otherwise.      INCLUDED BOWEL:  Normal in caliber and wall thickness.      RETROPERITONEUM:  Normal.  No hemorrhage or inflammatory change.  (lymph nodes in dedicated section)      OMENTUM, MESENTERY AND PERITONEAL SPACES:  No fluid collection or  free fluid.  Grossly normal omentum and mesentery.  (lymph nodes in  dedicated section)      VASCULATURE:  No abdominal aortic aneurysm.  No large vessel  occlusion or critical stenosis.  The portal venous system is patent.      LOWER CHEST:  Large right lower lobe mass grossly similar to CT chest  16 February 2024. There were additional, smaller nodules in the lung  bases at that time, some of which are evident on this MRI, others not  due to the differences between MRI and CT regarding lung nodules          BONES:  No acute pathologic fracture      IMPRESSION:  On the most recent comparison exam of the abdomen and pelvis, CT with  contrast 15 December 2023, there were just a few liver lesions  suspect for metastases; today, too numerous to count new variably  sized liver metastases, likely 100 or more      No other contributory abnormalities      No biliary duct dilation      No choledocholithiasis      No pancreatic duct dilation      Edematous gallbladder wall thickening, probably due to hepatic  dysfunction. No gallstones or gallbladder dilation      Away from the liver, no other  metastatic disease in the abdomen      No stigmata of carcinomatosis such as free fluid or omental or  peritoneal implants      Known right adrenal myelolipoma      MACRO:  None      Signed by: Gunner Worthington 2/26/2024 9:00 AM  Dictation workstation:   [id number]               Assessment and Plan:   Assessment:    63 year old post menopausal woman with stage IV breast cancer, invasive ductal carcinoma, ER/NE positive, HER 2 negative. s/p bilateral mastectomies in 01/2019.       This is a pre-chemo visit and to recheck LFTs which were progresively rising but normalized during her inpatient visit. follow-up visit. She was hospitalized at [facility] from 02/21-02/24 with complaints fo dizziness, imbalance , nausea and vomiting. Brain imaging unfortunately showed metastatic brain lesions, Due to abnormal LFTs she had been on track to complete outpatient MRCP. So this was completed during her inpatient hospitalization which unfortunately showed innumerable liver metastatic disease.    Given new brain metastatic disease and visceral crisis we will proceed with Sacituzumab which has CNS penetrance. This will need to wait for neurosurgery evaluation and determination of surgical intervention or not. Neurosurgery appointment is scheduled for 3/5 and therefore patient will be scheduled for treatment on 3/8 assuming surgical intervention is not planned. She will also see radiation oncology for gamma knife consideration.       Plan:       Discontinue Tumeric  Repeat LFTs on 03/08  First dose Sacituzumab on 03/08/2024 if liver enzymes trending down. Otherwise will need to hospitalize for further evaluation by hepatology with consideration for inpatient chemotherapy  Neurosurgery and radiation oncology consults are pending  MD visit 03/26/2024

## 2024-03-05 NOTE — PROGRESS NOTES
NEUROSURGERY EVALUATION NOTE    Diagnosis  Chio was seen today for new patient visit.  Diagnoses and all orders for this visit:  Malignant neoplasm of breast metastatic to brain, unspecified laterality (CMS/HCC)  Malignant neoplasm of overlapping sites of breast in female, estrogen receptor positive, unspecified laterality (CMS/HCC)  -     Referral to Neurosurgery      Patient Discussion/Summary  Chio has metastatic breast cancer.  She has known metastases to lungs and liver, and now likely new brain metastases.  These are small metastases associated with the hemorrhage, thus likely precipitating her symptoms.  The symptoms will improve with time as the hemorrhage resolves, although she will remain somewhat symptomatic due to ongoing irritation from the blood.    We explained that treatment of these small lesions is likely to take the form of gamma knife radiosurgery.  She has a referral to Dr. Contreras for his assessment on March 12.    In the interim, we have informed her that she may wean her dexamethasone from 6 mg 4 times daily, to 6 mg twice daily.  Dr. Contreras may further reduce this dosage.  We can then coordinate possible radiosurgery if he deems her to be an appropriate candidate.    Provider Impressions  Chio has a history of stage IV metastatic breast ductal carcinoma.  She has known metastases to the lungs and liver.  She recently presented to [facility] with lightheadedness, nausea and emesis, as well as gait imbalance.  She had attempted vertigo exercises at home, although her symptoms did not improve.  She had also tried antiemetics without relief.    Imaging in the ER demonstrated small intracranial lesions associated with hemorrhage.  She was seen by neurosurgery in consultation and an MRI was ordered.  This thereafter demonstrated 2 lesions in the cerebellar vermis and parietal lobe adjacent to the corpus callosum.  These appear to have hemorrhaged.  She was thereafter referred for  neurosurgical and radiation oncology consultation.  She was also placed on dexamethasone.    History of Present Illness  Chief Complaint:   Chief Complaint   Patient presents with    New Patient Visit         HPI: Chio Turner is a 63 y.o. female with HTN, hypoT, anxiety, & stage IV multifocal right-sided invasive ductal carcinoma with metastatic disease to bilateral lungs, liver and brain. Patient presented to the ED on 2/20/24 with nausea and dizziness, found to have cerebellar lesions. Brain MRI on 2/23/24 showed Hemorrhagic lesions in the cerebellar vermis and right parietal lobe adjacent to the splenium of the corpus callosum along with a few small enhancing cerebellar lesions are nonspecific.     To review: Her breast cancer was diagnosed on June 5, 2015, and is estrogen receptor positive at 90%, progesterone receptor positive at 70%, and HER-2/jordyn negative with neuroendocrine features. Staging CT at the time of initial diagnosis showed bilateral pulmonary nodules. Nodules were biospy-proven (07/01/2015) to be consistent with metastatic mammary carcinoma with neuroendocrine features, ER 99%, KS 85%, and her2 negative (1+). Patient presents today to discuss possible surgical planning.     --has had breast ca for 9 yrs   --fell into the wall, laid down, nauseous/ vomit--went to [facility]  --CTH  showed 2 lesions  --on steroids---can't sleep d/t steroids...always tired  (dex 4mg 4 x daily)  --still has a cough from side effects from chemo pill for the last 3 months  --has outpt PT and OT--pt been doing well from there standpoint    ROS: A complete 11 system ROS was performed (constitutional, eyes, ENT, cardiovascular, respiratory, GI, , musculoskeletal, skin, neurological, and psychiatric) and was negative aside from the pertinent positives and negatives noted in the HPI.      Previous History  Past Medical History:   Diagnosis Date    Other specified health status 06/16/2015    No pertinent past medical history      Past Surgical History:   Procedure Laterality Date    CT GUIDED PERCUTANEOUS BIOPSY LUNG  7/1/2015    CT GUIDED PERCUTANEOUS BIOPSY LUNG 7/1/2015 [facility]    HERNIA REPAIR  06/16/2015    Hernia Repair    OTHER SURGICAL HISTORY  01/23/2019    Breast reconstruction    OTHER SURGICAL HISTORY  01/24/2019    Mastectomy bilateral    TONSILLECTOMY  06/16/2015    Tonsillectomy    US GUIDED NEEDLE LIVER BIOPSY  12/26/2023    US GUIDED NEEDLE LIVER BIOPSY 12/26/2023 [facility]     Social History     Tobacco Use    Smoking status: Never     Passive exposure: Never    Smokeless tobacco: Never     Family History   Problem Relation Name Age of Onset    Ovarian cancer Mother      Colon cancer Father       Allergies   Allergen Reactions    Penicillins Hives and Unknown     Current Outpatient Medications   Medication Instructions    amLODIPine (Norvasc) 5 mg tablet 1 tablet, oral, Daily    calcium carbonate-vitamin D3 1,000 mg-20 mcg (800 unit) tablet oral, 2 times daily    chlorthalidone (Hygroton) 25 mg tablet 1 tablet, oral, Daily    cholecalciferol (Vitamin D-3) 25 MCG (1000 UT) capsule 1 capsule, oral, Daily    dexAMETHasone (DECADRON) 4 mg, oral, 4 times daily    dexAMETHasone (DECADRON) 8 mg, oral, Daily, For 3 days starting the day after treatment.    hydroCHLOROthiazide (HYDRODiuril) 25 mg tablet 1 tablet, oral, Daily    levothyroxine (SYNTHROID, LEVOXYL) 100 mcg, oral, Daily before breakfast    loperamide (Imodium) 2 mg capsule Take 2 capsules (4 mg) by mouth with the first episode of diarrhea and 1 capsule (2 mg) by mouth with any additional episodes. Maximum 8 capsules (16 mg) per day.    LORazepam (ATIVAN) 1 mg, oral, See admin instructions, Take 1 tablet 30 mins before MRCP. May repeat x once    magnesium oxide (Mag-Ox) 400 mg (241.3 mg magnesium) tablet 1 tablet, oral, Daily    multivit-min-iron-FA-vit K-lut (Centrum Silver Women) 8 mg iron-400 mcg-50 mcg tablet 1 tablet, oral, Daily    NON  FORMULARY 1 capsule, oral, 2 times daily,  Turmeric Curcumin Oral Capsule    OLANZapine (ZYPREXA) 5 mg, oral, Nightly, For 4 days starting the evening of treatment    ondansetron (Zofran) 8 mg tablet 1 tablet, oral, Every 8 hours PRN    ondansetron (ZOFRAN) 8 mg, oral, Every 8 hours PRN    PaxiL 10 mg tablet 1 tablet, oral, Daily    potassium chloride CR 20 mEq ER tablet 2 tablets, oral, 2 times daily    prochlorperazine (COMPAZINE) 10 mg, oral, Every 6 hours PRN    ribociclib (Kisqali) 3 x 200 mg tablet therapy pack Take 3 tablets (600 mg total) by mouth in the morning for 21 days on, followed by 7 days off per 28-day treatment cycle. Repeat cycle every 28 days.         Vitals  /80 (BP Location: Left arm, Patient Position: Sitting)   Pulse 84   Temp 36.5 °C (97.7 °F)   Resp 18   Wt 80.7 kg (177 lb 14.6 oz)   SpO2 97%   BMI 30.52 kg/m²       Physical Exam  Constitutional: Well appearing. No acute distress.   Musculoskeletal: No visible deformity of joints and nails. Normal ROM.  Orientation: Oriented to self, place, and time  Language: Fluid speech and intact cognition  CN 2: Normal   CN 3, 4, and 6: Normal   CN 5: Normal   CN 7: Normal   CN 8: Normal   CN 9 and 10: Normal   CN 11: Normal   CN 12: Normal   Muscle strength: 5/5 strength both UE and LE.  Muscle tone: No atrophy, abnormal movements, flaccidity, cogwheeling or spasticity.   Gait and station: Mild imbalance.  Sensation: Grossly intact throughout all dermatomes. No allodynia.   Reflexes: Symmetric and normal deep tendon reflexes throughout. Negative Chan's sign. No clonus at ankles.    Coordination: Mild dysmetria.  Mood and Affect: Appropriate mood and affect.       Results  Both CT and MRI scans of the brain were reviewed.  These demonstrate hemorrhagic lesions in the cerebellar vermis as well as the right parietal lobe adjacent to the splenium.  These are small, measuring around 1 cm each.

## 2024-03-06 ENCOUNTER — APPOINTMENT (OUTPATIENT)
Dept: NEUROSURGERY | Facility: HOSPITAL | Age: 64
End: 2024-03-06
Payer: MEDICARE

## 2024-03-06 ENCOUNTER — OFFICE VISIT (OUTPATIENT)
Dept: NEUROSURGERY | Facility: HOSPITAL | Age: 64
DRG: 436 | End: 2024-03-06
Payer: MEDICARE

## 2024-03-06 VITALS
DIASTOLIC BLOOD PRESSURE: 80 MMHG | WEIGHT: 177.91 LBS | SYSTOLIC BLOOD PRESSURE: 143 MMHG | TEMPERATURE: 97.7 F | HEART RATE: 84 BPM | RESPIRATION RATE: 18 BRPM | BODY MASS INDEX: 30.52 KG/M2 | OXYGEN SATURATION: 97 %

## 2024-03-06 DIAGNOSIS — Z17.0 MALIGNANT NEOPLASM OF OVERLAPPING SITES OF BREAST IN FEMALE, ESTROGEN RECEPTOR POSITIVE, UNSPECIFIED LATERALITY (MULTI): ICD-10-CM

## 2024-03-06 DIAGNOSIS — C79.31 MALIGNANT NEOPLASM OF BREAST METASTATIC TO BRAIN, UNSPECIFIED LATERALITY (MULTI): Primary | ICD-10-CM

## 2024-03-06 DIAGNOSIS — C50.919 MALIGNANT NEOPLASM OF BREAST METASTATIC TO BRAIN, UNSPECIFIED LATERALITY (MULTI): Primary | ICD-10-CM

## 2024-03-06 DIAGNOSIS — C50.819 MALIGNANT NEOPLASM OF OVERLAPPING SITES OF BREAST IN FEMALE, ESTROGEN RECEPTOR POSITIVE, UNSPECIFIED LATERALITY (MULTI): ICD-10-CM

## 2024-03-06 PROCEDURE — 99215 OFFICE O/P EST HI 40 MIN: CPT | Performed by: NEUROLOGICAL SURGERY

## 2024-03-06 PROCEDURE — 1036F TOBACCO NON-USER: CPT | Performed by: NEUROLOGICAL SURGERY

## 2024-03-06 PROCEDURE — 3079F DIAST BP 80-89 MM HG: CPT | Performed by: NEUROLOGICAL SURGERY

## 2024-03-06 PROCEDURE — 1111F DSCHRG MED/CURRENT MED MERGE: CPT | Performed by: NEUROLOGICAL SURGERY

## 2024-03-06 PROCEDURE — 1159F MED LIST DOCD IN RCRD: CPT | Performed by: NEUROLOGICAL SURGERY

## 2024-03-06 PROCEDURE — 3077F SYST BP >= 140 MM HG: CPT | Performed by: NEUROLOGICAL SURGERY

## 2024-03-06 PROCEDURE — 1126F AMNT PAIN NOTED NONE PRSNT: CPT | Performed by: NEUROLOGICAL SURGERY

## 2024-03-06 ASSESSMENT — PATIENT HEALTH QUESTIONNAIRE - PHQ9
SUM OF ALL RESPONSES TO PHQ9 QUESTIONS 1 AND 2: 1
1. LITTLE INTEREST OR PLEASURE IN DOING THINGS: NOT AT ALL
2. FEELING DOWN, DEPRESSED OR HOPELESS: SEVERAL DAYS

## 2024-03-06 ASSESSMENT — COLUMBIA-SUICIDE SEVERITY RATING SCALE - C-SSRS
6. HAVE YOU EVER DONE ANYTHING, STARTED TO DO ANYTHING, OR PREPARED TO DO ANYTHING TO END YOUR LIFE?: NO
2. HAVE YOU ACTUALLY HAD ANY THOUGHTS OF KILLING YOURSELF?: NO

## 2024-03-06 ASSESSMENT — PAIN SCALES - GENERAL: PAINLEVEL: 0-NO PAIN

## 2024-03-07 ENCOUNTER — TELEPHONE (OUTPATIENT)
Dept: RADIATION ONCOLOGY | Facility: HOSPITAL | Age: 64
End: 2024-03-07
Payer: MEDICARE

## 2024-03-07 NOTE — TELEPHONE ENCOUNTER
Called pt. to remind of appointment on 3/12/2024 at 2:30 pm with Dr. Contreras. Pt answered and confirmed  appointment.

## 2024-03-08 ENCOUNTER — OFFICE VISIT (OUTPATIENT)
Dept: HEMATOLOGY/ONCOLOGY | Facility: HOSPITAL | Age: 64
DRG: 436 | End: 2024-03-08
Payer: MEDICARE

## 2024-03-08 ENCOUNTER — HOSPITAL ENCOUNTER (INPATIENT)
Facility: HOSPITAL | Age: 64
LOS: 5 days | Discharge: HOME | DRG: 436 | End: 2024-03-13
Attending: INTERNAL MEDICINE | Admitting: INTERNAL MEDICINE
Payer: MEDICARE

## 2024-03-08 ENCOUNTER — HOSPITAL ENCOUNTER (INPATIENT)
Facility: HOSPITAL | Age: 64
LOS: 1 days | Discharge: STILL ADMITTED OR ADMINISTRATIVE | DRG: 436 | End: 2024-03-08
Attending: INTERNAL MEDICINE | Admitting: INTERNAL MEDICINE
Payer: MEDICARE

## 2024-03-08 VITALS
HEART RATE: 80 BPM | RESPIRATION RATE: 18 BRPM | TEMPERATURE: 96.3 F | SYSTOLIC BLOOD PRESSURE: 141 MMHG | DIASTOLIC BLOOD PRESSURE: 90 MMHG | HEIGHT: 64 IN | WEIGHT: 171.74 LBS | OXYGEN SATURATION: 100 % | BODY MASS INDEX: 29.32 KG/M2

## 2024-03-08 VITALS
SYSTOLIC BLOOD PRESSURE: 117 MMHG | HEIGHT: 65 IN | RESPIRATION RATE: 18 BRPM | OXYGEN SATURATION: 97 % | HEART RATE: 73 BPM | DIASTOLIC BLOOD PRESSURE: 81 MMHG | TEMPERATURE: 97.7 F | WEIGHT: 177 LBS | BODY MASS INDEX: 29.49 KG/M2

## 2024-03-08 VITALS — SYSTOLIC BLOOD PRESSURE: 131 MMHG | OXYGEN SATURATION: 99 % | DIASTOLIC BLOOD PRESSURE: 93 MMHG | HEART RATE: 71 BPM

## 2024-03-08 DIAGNOSIS — C80.1 CANCER (MULTI): Primary | ICD-10-CM

## 2024-03-08 DIAGNOSIS — C50.919 MALIGNANT NEOPLASM OF FEMALE BREAST, UNSPECIFIED ESTROGEN RECEPTOR STATUS, UNSPECIFIED LATERALITY, UNSPECIFIED SITE OF BREAST (MULTI): ICD-10-CM

## 2024-03-08 DIAGNOSIS — Z85.3 PERSONAL HISTORY OF BREAST CANCER: Primary | ICD-10-CM

## 2024-03-08 DIAGNOSIS — G93.9 CEREBELLAR LESION: ICD-10-CM

## 2024-03-08 DIAGNOSIS — I38 ENDOCARDITIS, VALVE UNSPECIFIED: ICD-10-CM

## 2024-03-08 DIAGNOSIS — R78.81 BACTEREMIA ASSOCIATED WITH INTRAVASCULAR LINE, INITIAL ENCOUNTER (CMS-HCC): ICD-10-CM

## 2024-03-08 DIAGNOSIS — A49.8 ENTEROCOCCUS FAECALIS INFECTION: ICD-10-CM

## 2024-03-08 DIAGNOSIS — K21.9 GASTROESOPHAGEAL REFLUX DISEASE, UNSPECIFIED WHETHER ESOPHAGITIS PRESENT: ICD-10-CM

## 2024-03-08 DIAGNOSIS — T82.7XXA BACTEREMIA ASSOCIATED WITH INTRAVASCULAR LINE, INITIAL ENCOUNTER (CMS-HCC): ICD-10-CM

## 2024-03-08 DIAGNOSIS — K75.9 HEPATITIS: Primary | ICD-10-CM

## 2024-03-08 LAB
ALBUMIN SERPL BCP-MCNC: 3.4 G/DL (ref 3.4–5)
ALP SERPL-CCNC: 431 U/L (ref 33–136)
ALT SERPL W P-5'-P-CCNC: 435 U/L (ref 7–45)
ANION GAP SERPL CALC-SCNC: 14 MMOL/L (ref 10–20)
AST SERPL W P-5'-P-CCNC: 236 U/L (ref 9–39)
BASO STIPL BLD QL SMEAR: PRESENT
BASOPHILS # BLD AUTO: 0.02 X10*3/UL (ref 0–0.1)
BASOPHILS NFR BLD AUTO: 0.1 %
BILIRUB DIRECT SERPL-MCNC: 1.8 MG/DL (ref 0–0.3)
BILIRUB SERPL-MCNC: 4.1 MG/DL (ref 0–1.2)
BUN SERPL-MCNC: 30 MG/DL (ref 6–23)
CALCIUM SERPL-MCNC: 9.1 MG/DL (ref 8.6–10.3)
CHLORIDE SERPL-SCNC: 97 MMOL/L (ref 98–107)
CO2 SERPL-SCNC: 27 MMOL/L (ref 21–32)
CREAT SERPL-MCNC: 0.6 MG/DL (ref 0.5–1.05)
DACRYOCYTES BLD QL SMEAR: NORMAL
EGFRCR SERPLBLD CKD-EPI 2021: >90 ML/MIN/1.73M*2
EOSINOPHIL # BLD AUTO: 0 X10*3/UL (ref 0–0.7)
EOSINOPHIL NFR BLD AUTO: 0 %
ERYTHROCYTE [DISTWIDTH] IN BLOOD BY AUTOMATED COUNT: 25.3 % (ref 11.5–14.5)
GLUCOSE SERPL-MCNC: 236 MG/DL (ref 74–99)
HCT VFR BLD AUTO: 36.3 % (ref 36–46)
HGB BLD-MCNC: 12.5 G/DL (ref 12–16)
HYPOCHROMIA BLD QL SMEAR: NORMAL
IMM GRANULOCYTES # BLD AUTO: 0.27 X10*3/UL (ref 0–0.7)
IMM GRANULOCYTES NFR BLD AUTO: 1.6 % (ref 0–0.9)
LYMPHOCYTES # BLD AUTO: 0.67 X10*3/UL (ref 1.2–4.8)
LYMPHOCYTES NFR BLD AUTO: 4 %
MCH RBC QN AUTO: 27.7 PG (ref 26–34)
MCHC RBC AUTO-ENTMCNC: 34.4 G/DL (ref 32–36)
MCV RBC AUTO: 81 FL (ref 80–100)
MONOCYTES # BLD AUTO: 1.6 X10*3/UL (ref 0.1–1)
MONOCYTES NFR BLD AUTO: 9.6 %
NEUTROPHILS # BLD AUTO: 14.14 X10*3/UL (ref 1.2–7.7)
NEUTROPHILS NFR BLD AUTO: 84.7 %
NRBC BLD-RTO: 0.8 /100 WBCS (ref 0–0)
OVALOCYTES BLD QL SMEAR: NORMAL
PLATELET # BLD AUTO: 211 X10*3/UL (ref 150–450)
POLYCHROMASIA BLD QL SMEAR: NORMAL
POTASSIUM SERPL-SCNC: 4.2 MMOL/L (ref 3.5–5.3)
PROT SERPL-MCNC: 6.1 G/DL (ref 6.4–8.2)
RBC # BLD AUTO: 4.51 X10*6/UL (ref 4–5.2)
RBC MORPH BLD: NORMAL
SCHISTOCYTES BLD QL SMEAR: NORMAL
SODIUM SERPL-SCNC: 134 MMOL/L (ref 136–145)
TARGETS BLD QL SMEAR: NORMAL
WBC # BLD AUTO: 16.7 X10*3/UL (ref 4.4–11.3)

## 2024-03-08 PROCEDURE — 1210000001 HC SEMI-PRIVATE ROOM DAILY

## 2024-03-08 PROCEDURE — 36591 DRAW BLOOD OFF VENOUS DEVICE: CPT

## 2024-03-08 PROCEDURE — 96360 HYDRATION IV INFUSION INIT: CPT | Mod: INF

## 2024-03-08 PROCEDURE — 2500000004 HC RX 250 GENERAL PHARMACY W/ HCPCS (ALT 636 FOR OP/ED)

## 2024-03-08 PROCEDURE — 99223 1ST HOSP IP/OBS HIGH 75: CPT

## 2024-03-08 PROCEDURE — 1111F DSCHRG MED/CURRENT MED MERGE: CPT | Performed by: NURSE PRACTITIONER

## 2024-03-08 PROCEDURE — 2500000001 HC RX 250 WO HCPCS SELF ADMINISTERED DRUGS (ALT 637 FOR MEDICARE OP)

## 2024-03-08 PROCEDURE — 1159F MED LIST DOCD IN RCRD: CPT | Performed by: NURSE PRACTITIONER

## 2024-03-08 PROCEDURE — 2500000004 HC RX 250 GENERAL PHARMACY W/ HCPCS (ALT 636 FOR OP/ED): Performed by: NURSE PRACTITIONER

## 2024-03-08 PROCEDURE — 99285 EMERGENCY DEPT VISIT HI MDM: CPT | Mod: 25

## 2024-03-08 PROCEDURE — 3075F SYST BP GE 130 - 139MM HG: CPT | Performed by: NURSE PRACTITIONER

## 2024-03-08 PROCEDURE — 99215 OFFICE O/P EST HI 40 MIN: CPT | Performed by: NURSE PRACTITIONER

## 2024-03-08 PROCEDURE — 80053 COMPREHEN METABOLIC PANEL: CPT | Performed by: INTERNAL MEDICINE

## 2024-03-08 PROCEDURE — 85025 COMPLETE CBC W/AUTO DIFF WBC: CPT | Performed by: INTERNAL MEDICINE

## 2024-03-08 PROCEDURE — 3080F DIAST BP >= 90 MM HG: CPT | Performed by: NURSE PRACTITIONER

## 2024-03-08 PROCEDURE — 2060000001 HC INTERMEDIATE ICU ROOM DAILY

## 2024-03-08 PROCEDURE — 82248 BILIRUBIN DIRECT: CPT

## 2024-03-08 PROCEDURE — 1036F TOBACCO NON-USER: CPT | Performed by: NURSE PRACTITIONER

## 2024-03-08 PROCEDURE — 1126F AMNT PAIN NOTED NONE PRSNT: CPT | Performed by: NURSE PRACTITIONER

## 2024-03-08 PROCEDURE — 2500000002 HC RX 250 W HCPCS SELF ADMINISTERED DRUGS (ALT 637 FOR MEDICARE OP, ALT 636 FOR OP/ED): Mod: MUE

## 2024-03-08 RX ORDER — DEXAMETHASONE 4 MG/1
4 TABLET ORAL 2 TIMES DAILY
Status: DISCONTINUED | OUTPATIENT
Start: 2024-03-08 | End: 2024-03-13 | Stop reason: HOSPADM

## 2024-03-08 RX ORDER — LEVOTHYROXINE SODIUM 100 UG/1
100 TABLET ORAL
Status: DISCONTINUED | OUTPATIENT
Start: 2024-03-09 | End: 2024-03-13 | Stop reason: HOSPADM

## 2024-03-08 RX ORDER — POLYETHYLENE GLYCOL 3350 17 G/17G
17 POWDER, FOR SOLUTION ORAL DAILY
Status: DISCONTINUED | OUTPATIENT
Start: 2024-03-08 | End: 2024-03-08

## 2024-03-08 RX ORDER — HEPARIN SODIUM,PORCINE/PF 10 UNIT/ML
50 SYRINGE (ML) INTRAVENOUS AS NEEDED
OUTPATIENT
Start: 2024-03-08

## 2024-03-08 RX ORDER — LOPERAMIDE HYDROCHLORIDE 2 MG/1
4 CAPSULE ORAL 3 TIMES DAILY PRN
Status: DISCONTINUED | OUTPATIENT
Start: 2024-03-08 | End: 2024-03-13 | Stop reason: HOSPADM

## 2024-03-08 RX ORDER — POLYETHYLENE GLYCOL 3350 17 G/17G
17 POWDER, FOR SOLUTION ORAL DAILY
Status: DISCONTINUED | OUTPATIENT
Start: 2024-03-08 | End: 2024-03-08 | Stop reason: HOSPADM

## 2024-03-08 RX ORDER — PAROXETINE 10 MG/1
10 TABLET, FILM COATED ORAL DAILY
Status: CANCELLED | OUTPATIENT
Start: 2024-03-08

## 2024-03-08 RX ORDER — SENNOSIDES 8.6 MG/1
2 TABLET ORAL 2 TIMES DAILY
Status: DISCONTINUED | OUTPATIENT
Start: 2024-03-08 | End: 2024-03-08 | Stop reason: HOSPADM

## 2024-03-08 RX ORDER — HEPARIN SODIUM,PORCINE/PF 10 UNIT/ML
50 SYRINGE (ML) INTRAVENOUS AS NEEDED
Status: DISCONTINUED | OUTPATIENT
Start: 2024-03-08 | End: 2024-03-08 | Stop reason: HOSPADM

## 2024-03-08 RX ORDER — POTASSIUM CHLORIDE 20 MEQ/1
40 TABLET, EXTENDED RELEASE ORAL DAILY
Status: DISCONTINUED | OUTPATIENT
Start: 2024-03-08 | End: 2024-03-12

## 2024-03-08 RX ORDER — HEPARIN 100 UNIT/ML
500 SYRINGE INTRAVENOUS AS NEEDED
OUTPATIENT
Start: 2024-03-08

## 2024-03-08 RX ORDER — CHLORTHALIDONE 25 MG/1
25 TABLET ORAL DAILY
Status: DISCONTINUED | OUTPATIENT
Start: 2024-03-08 | End: 2024-03-13 | Stop reason: HOSPADM

## 2024-03-08 RX ORDER — ENOXAPARIN SODIUM 100 MG/ML
40 INJECTION SUBCUTANEOUS EVERY 24 HOURS
Status: DISCONTINUED | OUTPATIENT
Start: 2024-03-08 | End: 2024-03-08

## 2024-03-08 RX ORDER — PROCHLORPERAZINE MALEATE 10 MG
10 TABLET ORAL EVERY 6 HOURS PRN
Status: DISCONTINUED | OUTPATIENT
Start: 2024-03-08 | End: 2024-03-13 | Stop reason: HOSPADM

## 2024-03-08 RX ORDER — PAROXETINE 10 MG/1
10 TABLET, FILM COATED ORAL DAILY
Status: DISCONTINUED | OUTPATIENT
Start: 2024-03-08 | End: 2024-03-13 | Stop reason: HOSPADM

## 2024-03-08 RX ORDER — AMLODIPINE BESYLATE 5 MG/1
5 TABLET ORAL DAILY
Status: DISCONTINUED | OUTPATIENT
Start: 2024-03-08 | End: 2024-03-13 | Stop reason: HOSPADM

## 2024-03-08 RX ORDER — ONDANSETRON HYDROCHLORIDE 8 MG/1
8 TABLET, FILM COATED ORAL EVERY 8 HOURS PRN
Status: DISCONTINUED | OUTPATIENT
Start: 2024-03-08 | End: 2024-03-13 | Stop reason: HOSPADM

## 2024-03-08 RX ORDER — DEXAMETHASONE 2 MG/1
4 TABLET ORAL 2 TIMES DAILY
Status: CANCELLED | OUTPATIENT
Start: 2024-03-08

## 2024-03-08 RX ORDER — HEPARIN 100 UNIT/ML
5 SYRINGE INTRAVENOUS ONCE
OUTPATIENT
Start: 2024-03-08 | End: 2024-03-08

## 2024-03-08 RX ORDER — HEPARIN 100 UNIT/ML
500 SYRINGE INTRAVENOUS AS NEEDED
Status: DISCONTINUED | OUTPATIENT
Start: 2024-03-08 | End: 2024-03-08 | Stop reason: HOSPADM

## 2024-03-08 RX ORDER — HYDROCHLOROTHIAZIDE 25 MG/1
25 TABLET ORAL DAILY
Status: DISCONTINUED | OUTPATIENT
Start: 2024-03-08 | End: 2024-03-08

## 2024-03-08 RX ORDER — LEVOTHYROXINE SODIUM 100 UG/1
100 TABLET ORAL
Status: CANCELLED | OUTPATIENT
Start: 2024-03-09

## 2024-03-08 RX ADMIN — SODIUM CHLORIDE 1000 ML: 9 INJECTION, SOLUTION INTRAVENOUS at 11:58

## 2024-03-08 RX ADMIN — DEXAMETHASONE 4 MG: 4 TABLET ORAL at 19:56

## 2024-03-08 RX ADMIN — AMLODIPINE BESYLATE 5 MG: 5 TABLET ORAL at 19:55

## 2024-03-08 RX ADMIN — POTASSIUM CHLORIDE 40 MEQ: 1500 TABLET, EXTENDED RELEASE ORAL at 19:56

## 2024-03-08 SDOH — SOCIAL STABILITY: SOCIAL INSECURITY: ABUSE: ADULT

## 2024-03-08 SDOH — SOCIAL STABILITY: SOCIAL INSECURITY: HAVE YOU HAD THOUGHTS OF HARMING ANYONE ELSE?: NO

## 2024-03-08 SDOH — SOCIAL STABILITY: SOCIAL INSECURITY: WERE YOU ABLE TO COMPLETE ALL THE BEHAVIORAL HEALTH SCREENINGS?: YES

## 2024-03-08 SDOH — SOCIAL STABILITY: SOCIAL INSECURITY: ARE YOU OR HAVE YOU BEEN THREATENED OR ABUSED PHYSICALLY, EMOTIONALLY, OR SEXUALLY BY ANYONE?: NO

## 2024-03-08 SDOH — SOCIAL STABILITY: SOCIAL INSECURITY: ARE THERE ANY APPARENT SIGNS OF INJURIES/BEHAVIORS THAT COULD BE RELATED TO ABUSE/NEGLECT?: NO

## 2024-03-08 SDOH — SOCIAL STABILITY: SOCIAL INSECURITY: DO YOU FEEL ANYONE HAS EXPLOITED OR TAKEN ADVANTAGE OF YOU FINANCIALLY OR OF YOUR PERSONAL PROPERTY?: NO

## 2024-03-08 SDOH — SOCIAL STABILITY: SOCIAL INSECURITY: DO YOU FEEL UNSAFE GOING BACK TO THE PLACE WHERE YOU ARE LIVING?: NO

## 2024-03-08 SDOH — SOCIAL STABILITY: SOCIAL INSECURITY: DOES ANYONE TRY TO KEEP YOU FROM HAVING/CONTACTING OTHER FRIENDS OR DOING THINGS OUTSIDE YOUR HOME?: NO

## 2024-03-08 SDOH — SOCIAL STABILITY: SOCIAL INSECURITY: HAS ANYONE EVER THREATENED TO HURT YOUR FAMILY OR YOUR PETS?: NO

## 2024-03-08 ASSESSMENT — COLUMBIA-SUICIDE SEVERITY RATING SCALE - C-SSRS
2. HAVE YOU ACTUALLY HAD ANY THOUGHTS OF KILLING YOURSELF?: NO
2. HAVE YOU ACTUALLY HAD ANY THOUGHTS OF KILLING YOURSELF?: NO
6. HAVE YOU EVER DONE ANYTHING, STARTED TO DO ANYTHING, OR PREPARED TO DO ANYTHING TO END YOUR LIFE?: NO
6. HAVE YOU EVER DONE ANYTHING, STARTED TO DO ANYTHING, OR PREPARED TO DO ANYTHING TO END YOUR LIFE?: NO
1. IN THE PAST MONTH, HAVE YOU WISHED YOU WERE DEAD OR WISHED YOU COULD GO TO SLEEP AND NOT WAKE UP?: NO
1. IN THE PAST MONTH, HAVE YOU WISHED YOU WERE DEAD OR WISHED YOU COULD GO TO SLEEP AND NOT WAKE UP?: NO

## 2024-03-08 ASSESSMENT — ACTIVITIES OF DAILY LIVING (ADL)
JUDGMENT_ADEQUATE_SAFELY_COMPLETE_DAILY_ACTIVITIES: YES
BATHING: INDEPENDENT
FEEDING YOURSELF: INDEPENDENT
HEARING - LEFT EAR: FUNCTIONAL
TOILETING: INDEPENDENT
PATIENT'S MEMORY ADEQUATE TO SAFELY COMPLETE DAILY ACTIVITIES?: YES
HEARING - RIGHT EAR: FUNCTIONAL
WALKS IN HOME: INDEPENDENT
LACK_OF_TRANSPORTATION: NO
GROOMING: INDEPENDENT
DRESSING YOURSELF: INDEPENDENT
ADEQUATE_TO_COMPLETE_ADL: YES

## 2024-03-08 ASSESSMENT — PAIN - FUNCTIONAL ASSESSMENT: PAIN_FUNCTIONAL_ASSESSMENT: 0-10

## 2024-03-08 ASSESSMENT — ENCOUNTER SYMPTOMS
PALPITATIONS: 0
FEVER: 0
DIAPHORESIS: 0
WEAKNESS: 0
CHILLS: 0
APPETITE CHANGE: 0
CONSTIPATION: 0
SHORTNESS OF BREATH: 0
BLOOD IN STOOL: 0
ABDOMINAL DISTENTION: 0
DIZZINESS: 0
LIGHT-HEADEDNESS: 0
ABDOMINAL PAIN: 0

## 2024-03-08 ASSESSMENT — LIFESTYLE VARIABLES
SUBSTANCE_ABUSE_PAST_12_MONTHS: NO
HOW MANY STANDARD DRINKS CONTAINING ALCOHOL DO YOU HAVE ON A TYPICAL DAY: PATIENT DOES NOT DRINK
PRESCIPTION_ABUSE_PAST_12_MONTHS: NO
AUDIT-C TOTAL SCORE: 0
HOW OFTEN DO YOU HAVE A DRINK CONTAINING ALCOHOL: NEVER
SKIP TO QUESTIONS 9-10: 1
AUDIT-C TOTAL SCORE: 0
HOW OFTEN DO YOU HAVE 6 OR MORE DRINKS ON ONE OCCASION: NEVER

## 2024-03-08 ASSESSMENT — COGNITIVE AND FUNCTIONAL STATUS - GENERAL
MOBILITY SCORE: 23
DAILY ACTIVITIY SCORE: 24
PATIENT BASELINE BEDBOUND: NO
CLIMB 3 TO 5 STEPS WITH RAILING: A LITTLE

## 2024-03-08 ASSESSMENT — PAIN SCALES - GENERAL
PAINLEVEL_OUTOF10: 0 - NO PAIN

## 2024-03-08 NOTE — CARE PLAN
The patient's goals for the shift include      The clinical goals for the shift include Patient will remain free from falls and injury throughout shift.      Problem: Pain - Adult  Goal: Verbalizes/displays adequate comfort level or baseline comfort level  Outcome: Progressing     Problem: Safety - Adult  Goal: Free from fall injury  Outcome: Progressing     Problem: Discharge Planning  Goal: Discharge to home or other facility with appropriate resources  Outcome: Progressing     Problem: Chronic Conditions and Co-morbidities  Goal: Patient's chronic conditions and co-morbidity symptoms are monitored and maintained or improved  Outcome: Progressing     Problem: Fall/Injury  Goal: Not fall by end of shift  Outcome: Progressing  Goal: Be free from injury by end of the shift  Outcome: Progressing  Goal: Verbalize understanding of personal risk factors for fall in the hospital  Outcome: Progressing  Goal: Verbalize understanding of risk factor reduction measures to prevent injury from fall in the home  Outcome: Progressing  Goal: Use assistive devices by end of the shift  Outcome: Progressing  Goal: Pace activities to prevent fatigue by end of the shift  Outcome: Progressing

## 2024-03-08 NOTE — Clinical Note
R chest Mediport removal performed. Patient received 2mg versed, 100mcg fentanyl IVP for sedation. R chest site sutured and steristrips applied. No visible bleeding or hematoma at R chest site. VSS throughout procedure.

## 2024-03-08 NOTE — H&P
History of Present Illness Note    History Of Present Illness  Chio Turner is a 63 y.o. female with a past medical history of stage IV right-sided breast ductal carcinoma (ER+ (90%), PA + (70%), HER-2-) diagnosed in June 2015 following with Dr. Kahn with metastatic disease to the lungs and liver and now brain admitted on 3/8/24 from infusion clinic with elevated LFTs.     The patient's breast cancer was diagnosed on June 5, 2015, and is estrogen receptor positive at 90%, progesterone receptor positive at 70%, and HER-2/jordyn negative with neuroendocrine features. Staging CT at the time of initial diagnosis showed bilateral pulmonary nodules. Nodules were biospy-proven (07/01/2015)  to be consistent with metastatic mammary carcinoma with neuroendocrine features, ER 99%, PA 85%, and her2 negative (1+). Developed progression in liver. Liver biopsy  completed 12/26/2023 revealed metastatic mammary carcinoma with neuroendocrine features, ER+ 95%, PA positive 85% and Her2 negative 1+.     Of note, patient was admitted to [facility] 2/21/24-2/24/24 for lightheadedness, nausea and vomiting and was found to have CT scan of the head revealed 2 hyperdense cerebral masses the largest 1 measuring 10 x 14 mm, located in the cerebellum vermis and associated with mild edema most consistent with metastatic disease. MRI of the brain revealed hemorrhagic lesions in the cerebellar vermis in the right parietal lobe adjacent to the splenium of the corpus callosum, concerning with hemorrhagic metastases as well as hemorrhages due to slow flow vascular malformation. MRCP completed due to elevated LFTs 2/24. MRCP showed innumerable liver mets. Patient planned to undergo eval for gamma knife therapy and sent home with Decadron 4 mg p.o. every 6 hours. Patient saw [facility] 3/5 - advised to wean dexamethasone from 6 mg 4 times daily, to 6 mg twice daily.  Dr. Contreras may further reduce this dosage and then coordinate possible radiosurgery if he  deems her to be an appropriate candidate.     Patient saw Dr. Kahn 3/5/24 and was scheduled for treatment on 3/8 with Sacituzumab (CNS penetrance) given new brain metastatic disease. Patient presented to infusion clinic and labs showed elevated LFTs and decision was made to forgo treatment and admission was recommended for further workup.     At bedside, the patient states that she was in her usual state of health and presented to the infusion center today LFTs were elevated.  She stated that she feels well overall.  Recently saw neurosurgery and Dr. Kahn this week.  Reported that she stopped taking her multivitamins turmeric supplement, which she was told could be contributing to elevated LFTs.  She states that she is aware of her recent diagnosis of static cancer to the liver and brain. Denies any new medications, alcohol use, drug use, new supplements. Denies any fevers, chills, shortness of breath, chest pain, abdominal pain, nausea, vomiting.      Oncology History  CURRENT THERAPY: Ribociclib and Faslodex -> planned for  Sacituzumab 3/8    2015-7-1: Cancer Staging: Lung biopsy: Metastatic mammary carcinoma with neuroendocrine features, ER 99%, W[place] 85%, and her2 negative.  2015-7-8: New Treatment Plan: Initiated on Letrozole 2.5mg by mouth daily plus pablociclib 125 mg by mouth daily 3 weeks on, one week off.  2018-8-10: New Treatment Plan: Letrozole discontinued. Patient started on Faslodex plus Ibrance due to slight disease progression in right breast  2019-1-16: Cancer Related Surgery: S/p bilateral mastectomy with right ALND. Pathology revealed 3.0cm of invasive ductal carcinoma, with 1/2 SLNs positive and 1/5 ALNs positive.  2019-4-19: New Treatment Plan: Faslodex discontinued. Letrozole restarted. Will continue on Ibrance  2020-9-11: New Treatment Plan: Faslodex resumed due to progression in lung. Letrozole discontinued. Will remain on Ibrance  2022-7-2: New Treatment Plan: Started on Afinitor and  Exemestane. Ibrance and Faslodex discontinued due to disease progression  2022-9-16: Disease Assessment: Interval increase in thoracic tumor burden  2022-10-7: New Treatment Plan: Started on Capecitabine 2000mg by mouth BID  2023-2-10: New treatment started with FAM-Trastuzumab Deruxtecan due to disease progression  2023-15-12: Patient underwent restaging scans. This showed disease progression in lung and liver with redemonstration of several bilateral pulmonary nodules/masses stable to increased in size since 09/01/2023, concerning for progressive metastatic disease and interval development of several hypoattenuating, some subtle, hepatic lesions as detailed above, concerning for metastatic disease.  12/26/2023: Patient underwent liver biopsy. This revealed metastatic mammry carcinoma with neuroendocrine features, ER+ 95%, NV positive 85% and Her2 negative 1+  1/12/2024: Received the last dose of Enhertu. Discontinued due to disease progression  02/01/2024: Switched to Ribociclib and Faslodex. This regimen selected because foundation one confirms CCND1 amplification and patient was stable on Ibrance for 5 years from 0013-8905 and has thus far been the most effective regimen for patient.  02/24/2024: Patient completed MRI of the brain which showed hemorrhagic lesions in the cerebellar vermis and right parietal lobe adjacent to the splenium of the corpus callosum along with a few small enhancing cerebellar lesions are nonspecific.   02/24/2024: MRCP showed innumerable liver lesions     ED Course:  BP: 117/81  Temperature: 36.5 °C (97.7 °F)  Heart Rate: 73  Respirations: 18  Pulse Ox: 97 %    ED Interventions  Medications   enoxaparin (Lovenox) syringe 40 mg (has no administration in time range)   sennosides (Senokot) tablet 17.2 mg (has no administration in time range)   polyethylene glycol (Glycolax, Miralax) packet 17 g (has no administration in time range)       EKG  Encounter Date: 02/21/24   ECG 12 lead   Result  Value    Ventricular Rate 75    Atrial Rate 75    WV Interval 166    QRS Duration 120    QT Interval 384    QTC Calculation(Bazett) 428    P Axis 26    R Axis -22    T Axis 9    QRS Count 13    Q Onset 208    P Onset 125    P Offset 187    T Offset 400    QTC Fredericia 413    Narrative    Normal sinus rhythm  Left ventricular hypertrophy with QRS widening ( R in aVL , Johnny product )  Nonspecific ST and T wave abnormality  Abnormal ECG  When compared with ECG of 15-FEB-2024 11:57,  Nonspecific T wave abnormality now evident in Anterolateral leads  See ED provider note for full interpretation and clinical correlation  Confirmed by Carrie Horan ([id number]) on 2/24/2024 10:18:02 AM        Review of Systems   Constitutional:  Negative for appetite change, chills, diaphoresis and fever.   Respiratory:  Negative for shortness of breath.    Cardiovascular:  Negative for chest pain and palpitations.   Gastrointestinal:  Negative for abdominal distention, abdominal pain, blood in stool and constipation.   Neurological:  Negative for dizziness, syncope, weakness and light-headedness.       Past Medical History    Past Medical History:   Diagnosis Date    Other specified health status 06/16/2015    No pertinent past medical history       Home Medications  (Not in a hospital admission)      Surgical History    Past Surgical History:   Procedure Laterality Date    CT GUIDED PERCUTANEOUS BIOPSY LUNG  7/1/2015    CT GUIDED PERCUTANEOUS BIOPSY LUNG [place] [facility]    HERNIA REPAIR  06/16/2015    Hernia Repair    OTHER SURGICAL HISTORY  01/23/2019    Breast reconstruction    OTHER SURGICAL HISTORY  01/24/2019    Mastectomy bilateral    TONSILLECTOMY  06/16/2015    Tonsillectomy    US GUIDED NEEDLE LIVER BIOPSY  12/26/2023    US GUIDED NEEDLE LIVER BIOPSY 12/26/2023 Torie Rose MD [facility]       Allergies    Allergies   Allergen Reactions    Penicillins Hives and Unknown       Social History    Social History     Socioeconomic  History    Marital status:      Spouse name: Not on file    Number of children: Not on file    Years of education: Not on file    Highest education level: Not on file   Occupational History    Not on file   Tobacco Use    Smoking status: Never     Passive exposure: Never    Smokeless tobacco: Never   Substance and Sexual Activity    Alcohol use: Not on file    Drug use: Not on file    Sexual activity: Not on file   Other Topics Concern    Not on file   Social History Narrative    Not on file     Social Determinants of Health     Financial Resource Strain: Low Risk  (3/8/2024)    Overall Financial Resource Strain (CARDIA)     Difficulty of Paying Living Expenses: Not hard at all   Food Insecurity: Not on file   Transportation Needs: No Transportation Needs (3/8/2024)    PRAPARE - Transportation     Lack of Transportation (Medical): No     Lack of Transportation (Non-Medical): No   Physical Activity: Not on file   Stress: Not on file   Social Connections: Not on file   Intimate Partner Violence: Not on file   Housing Stability: Low Risk  (3/8/2024)    Housing Stability Vital Sign     Unable to Pay for Housing in the Last Year: No     Number of Places Lived in the Last Year: 1     Unstable Housing in the Last Year: No       Family History    Family History   Problem Relation Name Age of Onset    Ovarian cancer Mother      Colon cancer Father         Medications  Scheduled medications    Continuous medications         Cardiac Hx:  Last echo: No results found for this or any previous visit.   Last EKG:   Encounter Date: 02/21/24   ECG 12 lead   Result Value    Ventricular Rate 75    Atrial Rate 75    NE Interval 166    QRS Duration 120    QT Interval 384    QTC Calculation(Bazett) 428    P Axis 26    R Axis -22    T Axis 9    QRS Count 13    Q Onset 208    P Onset 125    P Offset 187    T Offset 400    QTC Fredericia 413    Narrative    Normal sinus rhythm  Left ventricular hypertrophy with QRS widening ( R in aVL  ", Vanzant product )  Nonspecific ST and T wave abnormality  Abnormal ECG  When compared with ECG of 15-FEB-2024 11:57,  Nonspecific T wave abnormality now evident in Anterolateral leads  See ED provider note for full interpretation and clinical correlation  Confirmed by Carrie Horan ([id number]) on 2/24/2024 10:18:02 AM          Objective   Current Vitals  /81 (BP Location: Right arm, Patient Position: Sitting)   Pulse 73   Temp 36.5 °C (97.7 °F) (Temporal)   Resp 18   Ht 1.651 m (5' 5\")   Wt 80.3 kg (177 lb)   SpO2 97%   BMI 29.45 kg/m²    No intake or output data in the 24 hours ending 03/08/24 1725      Physical Exam  Constitutional:       Appearance: Normal appearance.   Eyes:      Extraocular Movements: Extraocular movements intact.      Conjunctiva/sclera: Conjunctivae normal.   Cardiovascular:      Rate and Rhythm: Normal rate and regular rhythm.      Pulses: Normal pulses.      Heart sounds: Normal heart sounds.   Pulmonary:      Effort: Pulmonary effort is normal.      Breath sounds: Normal breath sounds.   Abdominal:      General: Abdomen is flat. Bowel sounds are normal.      Palpations: Abdomen is soft.   Neurological:      General: No focal deficit present.      Mental Status: She is alert and oriented to person, place, and time.   Psychiatric:         Mood and Affect: Mood normal.         Thought Content: Thought content normal.          Labs  CBC: WBC 16.7 , HGB 12.5,   BMP: , K 4.2, Cl 97, HCO3 27, BUN 30, CR 0.60, Glu 236  LFTS:  , , ALKPHOS 431 , TBILI 4.1 , DBILI No results found for requested labs within last 365 days.  TROP: 4  BNP: No results found for requested labs within last 365 days.  COAGS: PT 12.3 , PTT No results found for requested labs within last 365 days.  , INR 1.1  UA:     ABG:    CALCIUM 9.1 MAG No results found for requested labs within last 365 days. ALB 3.4 LACTATE No results found for requested labs within last 365 days. PHOS No results found " for requested labs within last 365 days. COVIDNo results found for requested labs within last 365 days.    Imaging  CT ANGIO HEAD AND NECK W AND WO IV CONTRAST 2/21/24:  1. 2 hyperdense cerebellar masses, the largest measures 10 x 14 mm,  most consistent with metastatic disease. The larger lesion is located  in the cerebellar vermis and associated with mild edema. No  significant mass effect. Questionable small mass involving the right  lentiform nucleus. MRI brain with contrast is more sensitive  evaluation for metastatic disease and may be considered.    2. No significant stenosis of the cervical carotid or vertebral  arteries.    3. No intracranial major branch occlusion or hemodynamically  significant stenosis.    4. Partially imaged pulmonary/intrathoracic metastatic disease.    MR BRAIN 2/23/24:  Hemorrhagic lesions in the cerebellar vermis and right parietal lobe  adjacent to the splenium of the corpus callosum along with a few  small enhancing cerebellar lesions are nonspecific. Consider  hemorrhagic metastases as well as hemorrhages due to slow flow  vascular malformations.    MRCP 2/22/24:  On the most recent comparison exam of the abdomen and pelvis, CT with  contrast 15 December 2023, there were just a few liver lesions  suspect for metastases; today, too numerous to count new variably  sized liver metastases, likely 100 or more.       No other contributory abnormalities      No biliary duct dilation      No choledocholithiasis      No pancreatic duct dilation      Edematous gallbladder wall thickening, probably due to hepatic  dysfunction. No gallstones or gallbladder dilation      Away from the liver, no other metastatic disease in the abdomen      No stigmata of carcinomatosis such as free fluid or omental or  peritoneal implants      Known right adrenal myelolipoma         Assessment/Plan   Chio Turner is a 63 y.o. female with a past medical history of stage IV right-sided breast ductal carcinoma  "(ER+ (90%), OK + (70%), HER-2-) diagnosed in June 2015 with metastatic disease to the lungs and liver and now brain admitted on 3/8/24 from infusion clinic with elevated LFTs. Likely that elevated transaminases are in the setting of worsening metastatic disease vs biliary obstruction vs DILI vs viral etiology. R-factor score 3.0 showing mixed hepatocellular and cholestatic injury. Patient currently asymptomatic and reports being in her usual state of health, no abdominal pain, n/v, f/c. Labs, RUQ US pending.     #Elevated LFTs in the setting of metastatic disease to liver   #Hyperbilirubinemia   -R-factor score 3.0; showing mixed hepatocellular and cholestatic injury   -MRCP at OSH 2/27/24 showed \"too numerous to count new variably sized liver metastases, likely 100 or more \"  -Was taking Tumeric supplement, other vitamin supplements at home but discontinued this during the week; no other new medications reported  -Etiologies include worsening metastatic disease vs biliary obstruction vs DILI vs viral etiology  Plan:   - RUQ U/S pending   - Coags pending  - Hepatitis serologies pending   - Anti-sm muscle antibody, ceruloplasmin, AFP, anti-mitochondrial antibody pending   - Direct bilirubin pending   - Utox pending, blood cultures pending   - GI consult in the AM if LFTs do not improve     #Leukocytosis iso steroid usage   - Likely steroid induced, pt has been on steroids since admission at [facility] with findings of metastatic disease to the brain   - Low suspicion for infectious etiology as patient is asymptomatic, HDS    - CTM   - Bcx pending     #Stage IV right-sided breast ductal carcinoma (ER+, OK+, HER2-) diagnosed in June 2015 with metastatic disease to the lungs, liver, brain  - Follows with Dr. Kahn, last seen on 3/5/24   - Planned to have first dose Sacituzumab on 03/08/2024 if liver enzymes trending down, but patient presented with elevated LFTs   - Will inform Dr. Kahn in the AM     #Cerebellar mass " likely from metastatic in etiology  -MRI brain showed Hemorrhagic lesions in the cerebellar vermis and right parietal lobe adjacent to the splenium of the corpus callosum along with a few small enhancing cerebellar lesions are nonspecific. Consider hemorrhagic metastases as well as hemorrhages due to slow flow vascular malformations.  - Patient saw NSG 3/5 - advised to wean dexamethasone from 6 mg 4 times daily, to 6 mg twice daily.  Dr. Contreras may further reduce this dosage and then coordinate possible radiosurgery if he deems her to be an appropriate candidate.   - Patient reports taking Decadron 4mg BID  -No further lightheadedness, weakness, dizziness, or other sx   Plan:   - Continue Decadron 4mg BID   - Holding any AC for now iso possible hemorrhagic mets on MRI   -PT/OT eval, follows with outpatient PT/OT     #Hypothyroidism   - Continue levothyroxine 100 mcg orally daily     #Hypertension  -Hold amlodipine 5 mg orally daily  -Hold 25 mg orally daily  -Hold HCTZ for now  -Ctm Bps, currently in 110s systolics; can add in AM if room in BP     #Anxiety  -Continue Paxil and Atarax    Fluids: Replete PRN  Electrolytes: Keep mg >2, phos >3  and K >4  Nutrition:  Adult diet Regular   Antimicrobials:   DVT PPX: SCD's in the setting of hemorrhagic mets to brain   Bowel Care: Miralax  Lines: PIV    Code Status: DNR/DNI - OK for ICU (confirmed on admission)  NOK:  Primary Emergency Contact: Daniel Turner, Home Phone: [phone]      Patient and plan will be discussed with attending physician Dr. Cannon in the morning.      Yovana Stewart MD  Department of Internal Medicine, PGY-1

## 2024-03-08 NOTE — PROGRESS NOTES
Pt arrived to infusion for treatment.  Per provider, to draw labs and determine plan based on results.  Labs drawn from implanted port.  Due to increasingly elevated liver enzymes, provider notified and decision made to send pt to ER and forgo treatment today.  SOO Casarez RN called patient to discuss plan.  This RN walked patient and  to ER and assisted at check-in.  Pt left in ER waiting room in stable condition in wheelchair with .  No further questions at this time.

## 2024-03-08 NOTE — PROGRESS NOTES
"Patient ID: Chio Turner is a 63 y.o. female.    The patient presents to [facility] for direct admission. She presented to infusion center for scheduled sacituzumab and was found to have hyperbilirubinemia. She is relatively asymptomatic but does endorse some fatigue and poor po intake this week. Otherwise Pt denies chest pain, cough, SOB, headaches, blurry vision, falls, fever or chills, n/v/d/abd pain, or urinary complaints.   She is disappointed for this setback but understanding of need to be admitted for further management.     Objective    BSA: There is no height or weight on file to calculate BSA.          2/24/2024     8:46 AM 3/5/2024    12:24 PM 3/6/2024     1:54 PM 3/8/2024     8:41 AM 3/8/2024     9:51 AM   Vitals   Systolic 145 160 143 141 117   Diastolic 75 93 80 90 81   Heart Rate 66 96 84 80 73   Temp 36.6 °C (97.9 °F) 36.1 °C (97 °F) 36.5 °C (97.7 °F) 35.7 °C (96.3 °F) 36.5 °C (97.7 °F)   Resp 18 16 18 18 18   Height (in)  1.626 m (5' 4.02\")  1.626 m (5' 4.02\") 1.651 m (5' 5\")   Weight (lb)  177.47 177.91 171.74 177   BMI  30.45 kg/m2 30.52 kg/m2 29.46 kg/m2 29.45 kg/m2   BSA (m2)  1.91 m2 1.91 m2 1.88 m2 1.92 m2   Visit Report  Report Report           Results from last 7 days   Lab Units 03/08/24  0851 03/05/24  1206   WBC AUTO x10*3/uL 16.7* 17.0*   HEMOGLOBIN g/dL 12.5 12.5   HEMATOCRIT % 36.3 36.2   PLATELETS AUTO x10*3/uL 211 230   NEUTROS ABS x10*3/uL 14.14* 14.38*   LYMPHS ABS AUTO x10*3/uL 0.67* 0.46*   MONOS ABS AUTO x10*3/uL 1.60* 1.91*   EOS ABS AUTO x10*3/uL 0.00 0.00   NEUTROS PCT AUTO % 84.7 84.7   LYMPHS PCT AUTO % 4.0 2.7   MONOS PCT AUTO % 9.6 11.2   EOS PCT AUTO % 0.0 0.0        Results from last 7 days   Lab Units 03/08/24  0851 03/05/24  1206   GLUCOSE mg/dL 236* 318*   SODIUM mmol/L 134* 132*   POTASSIUM mmol/L 4.2 4.2   CHLORIDE mmol/L 97* 94*   CO2 mmol/L 27 28   BUN mg/dL 30* 30*   CREATININE mg/dL 0.60 0.57   EGFR mL/min/1.73m*2 >90 >90   CALCIUM mg/dL 9.1 9.5   ALBUMIN g/dL 3.4 " 3.4   PROTEIN TOTAL g/dL 6.1* 6.3*                      === 11/19/19 ===    XR KNEE 3 VIEWS BILATERAL    - Impression -  Moderate medial and patellofemoral compartment osteoarthritis  === 02/21/24 ===    CT ANGIO HEAD AND NECK W AND WO IV CONTRAST    - Impression -  1. 2 hyperdense cerebellar masses, the largest measures 10 x 14 mm,  most consistent with metastatic disease. The larger lesion is located  in the cerebellar vermis and associated with mild edema. No  significant mass effect. Questionable small mass involving the right  lentiform nucleus. MRI brain with contrast is more sensitive  evaluation for metastatic disease and may be considered.    2. No significant stenosis of the cervical carotid or vertebral  arteries.    3. No intracranial major branch occlusion or hemodynamically  significant stenosis.    4. Partially imaged pulmonary/intrathoracic metastatic disease.    MACRO:  None    Signed by: Kerry Burgos 2/21/2024 11:41 PM  Dictation workstation:   [id number]  === 02/21/24 ===    MR MRCP WITH PANCREAS WO AND W CONTRAST    - Impression -  On the most recent comparison exam of the abdomen and pelvis, CT with  contrast 15 December 2023, there were just a few liver lesions  suspect for metastases; today, too numerous to count new variably  sized liver metastases, likely 100 or more    No other contributory abnormalities    No biliary duct dilation    No choledocholithiasis    No pancreatic duct dilation    Edematous gallbladder wall thickening, probably due to hepatic  dysfunction. No gallstones or gallbladder dilation    Away from the liver, no other metastatic disease in the abdomen    No stigmata of carcinomatosis such as free fluid or omental or  peritoneal implants    Known right adrenal myelolipoma    MACRO:  None    Signed by: Gunner Worthington 2/26/2024 9:00 AM  Dictation workstation:   [id number]   No nuclear medicine results found for the past 12 months   Transthoracic Echo (TTE)  Complete    Result Date: 11/8/2023   [facility], [address] [place] [address]                Tel [phone] and Fax [phone] TRANSTHORACIC ECHOCARDIOGRAM REPORT  Patient Name:      TJ Lora Physician:    [id number] Yaw Kern MD Study Date:        11/7/2023            Ordering Provider:    [id number] OTTONIEL POWERS MRN/PID:           [id number]             Fellow: Accession#:        [id number]         Nurse: Date of Birth/Age: 1960 / 62      Sonographer:          Shanice smith                                      RDVITOR Gender:            F                    Additional Staff: Height:            162.56 cm            Admit Date: Weight:            89.36 kg             Admission Status:     Outpatient BSA:               1.94 m2              Encounter#:           [id number]                                         Department Location:  [facility] Non                                                               Invasive Blood Pressure: 159 /91 mmHg Study Type:    TRANSTHORACIC ECHO (TTE) COMPLETE Diagnosis/ICD: Malignant neoplasm of unspecified site of right female                breast-C50.911 Indication:    Malignant neoplasm of female breast CPT Code:      Echo Complete w Full [id number] Patient History: Pertinent History: HTN and DM. Breast cancer of right breast with mets to both                    lungs; chemotherapy. Study Detail: The following Echo studies were performed: 2D, M-Mode, Doppler and               color flow.  PHYSICIAN INTERPRETATION: Left Ventricle: The left ventricular systolic function is normal, with an estimated ejection fraction of 65%. There are no regional wall motion abnormalities. The left ventricular cavity size is upper limits of normal. Spectral Doppler shows an impaired relaxation  pattern of left ventricular diastolic filling. Left Atrium: The left atrium is normal in size. Right Ventricle: The right ventricle is normal in size. There is normal right ventricular global systolic function. Right Atrium: The right atrium is normal in size. Aortic Valve: The aortic valve is probably trileaflet. There is trivial aortic valve regurgitation. The peak instantaneous gradient of the aortic valve is 7.6 mmHg. Mitral Valve: The mitral valve is normal in structure. There is trace mitral valve regurgitation. Tricuspid Valve: The tricuspid valve is structurally normal. There is trace tricuspid regurgitation. Pulmonic Valve: The pulmonic valve is not well visualized. There is trace pulmonic valve regurgitation. Pericardium: There is a trivial pericardial effusion. Aorta: The aortic root is normal. There is no dilatation of the ascending aorta. There is no dilatation of the aortic root. There is plaque visualized in the ascending aorta, which is classified as a Grade 2 [mild (focal or diffuse) intimal thickening of 2-3 mm] atherosclerosis. Systemic Veins: The inferior vena cava appears to be of normal size. In comparison to the previous echocardiogram(s): Compared with study from 8/10/2023,.  CONCLUSIONS:  1. Left ventricular systolic function is normal with a 65% estimated ejection fraction.  2. Spectral Doppler shows an impaired relaxation pattern of left ventricular diastolic filling.  3. There is plaque visualized in the ascending aorta. QUANTITATIVE DATA SUMMARY: 2D MEASUREMENTS:                          Normal Ranges: IVSd:          0.60 cm   (0.6-1.1cm) LVPWd:         0.60 cm   (0.6-1.1cm) LVIDd:         5.50 cm   (3.9-5.9cm) LVIDs:         3.60 cm LV Mass Index: 57.8 g/m2 LV % FS        34.5 % LA VOLUME:                               Normal Ranges: LA Vol A4C:        47.4 ml    (22+/-6mL/m2) LA Vol A2C:        51.0 ml LA Vol BP:         49.6 ml LA Vol Index A4C:  24.4ml/m2 LA Vol Index A2C:  26.3  ml/m2 LA Vol Index BP:   25.5 ml/m2 LA Area A4C:       17.6 cm2 LA Area A2C:       18.4 cm2 LA Major Axis A4C: 5.6 cm LA Major Axis A2C: 5.6 cm RA VOLUME BY A/L METHOD:                       Normal Ranges: RA Area A4C: 13.9 cm2 M-MODE MEASUREMENTS:                  Normal Ranges: Ao Root: 3.70 cm (2.0-3.7cm) LAs:     4.50 cm (2.7-4.0cm) AORTA MEASUREMENTS:                      Normal Ranges: Ao Sinus, d: 3.28 cm (2.1-3.5cm) Asc Ao, d:   3.42 cm (2.1-3.4cm) LV SYSTOLIC FUNCTION BY 2D PLANIMETRY (MOD):                     Normal Ranges: EF-A4C View: 61.3 % (>=55%) EF-A2C View: 63.7 % EF-Biplane:  62.7 % LV DIASTOLIC FUNCTION:                               Normal Ranges: MV Peak E:        0.50 m/s    (0.7-1.2 m/s) MV Peak A:        0.73 m/s    (0.42-0.7 m/s) E/A Ratio:        0.68        (1.0-2.2) MV e'             0.07 m/s    (>8.0) MV lateral e'     0.08 m/s MV medial e'      0.06 m/s MV A Dur:         130.00 msec E/e' Ratio:       7.10        (<8.0) PulmV Sys Mahendra:    54.80 cm/s PulmV Quiles Mahendra:   31.30 cm/s PulmV S/D Mahendra:    1.80 PulmV A Revs Mahendra: 28.30 cm/s PulmV A Revs Dur: 148.00 msec MITRAL VALVE:                 Normal Ranges: MV DT: 208 msec (150-240msec) AORTIC VALVE:                         Normal Ranges: AoV Vmax:      1.38 m/s (<=1.7m/s) AoV Peak P.6 mmHg (<20mmHg) LVOT Max Mahendra:  1.06 m/s (<=1.1m/s) LVOT VTI:      21.40 cm LVOT Diameter: 2.00 cm  (1.8-2.4cm) AoV Area,Vmax: 2.41 cm2 (2.5-4.5cm2)  RIGHT VENTRICLE: RV Basal 4.10 cm RV Mid   2.95 cm RV Major 7.3 cm TAPSE:   21.6 mm RV s'    0.13 m/s TRICUSPID VALVE/RVSP:                   Normal Ranges: IVC Diam: 1.90 cm PULMONIC VALVE:                         Normal Ranges: PV Accel Time: 130 msec (>120ms) PV Max Mahendra:    1.1 m/s  (0.6-0.9m/s) PV Max P.5 mmHg Pulmonary Veins: PulmV A Revs Dur: 148.00 msec PulmV A Revs Mahendra: 28.30 cm/s PulmV Quiles Mahendra:   31.30 cm/s PulmV S/D Mahendra:    1.80 PulmV Sys Mahendra:    54.80 cm/s  [id number] Yaw Kern MD  Electronically signed on 11/8/2023 at 2:17:49 PM  ** Final **           Physical Exam  Vitals reviewed.   Constitutional:       Appearance: Normal appearance.   HENT:      Head: Normocephalic and atraumatic.      Nose: Nose normal.      Mouth/Throat:      Mouth: Mucous membranes are moist.   Eyes:      Conjunctiva/sclera: Conjunctivae normal.      Pupils: Pupils are equal, round, and reactive to light.   Cardiovascular:      Rate and Rhythm: Normal rate and regular rhythm.   Pulmonary:      Effort: Pulmonary effort is normal.      Breath sounds: Normal breath sounds.   Abdominal:      General: Abdomen is flat. Bowel sounds are normal.      Palpations: Abdomen is soft.   Musculoskeletal:         General: Normal range of motion.      Cervical back: Normal range of motion.   Skin:     General: Skin is warm and dry.   Neurological:      General: No focal deficit present.      Mental Status: She is alert.   Psychiatric:         Mood and Affect: Mood normal.         Behavior: Behavior normal.         Cancer Staging   No matching staging information was found for the patient.    Oncology History   Malignant neoplasm of female breast (CMS/HCC)   2/10/2023 - 1/12/2024 Chemotherapy    Fam-trastuzumab deruxtecan, 21 Day Cycles - Breast     9/27/2023 Initial Diagnosis    Malignant neoplasm of female breast (CMS/HCC)     2/2/2024 - 2/15/2024 Chemotherapy    Fulvestrant, 28 Day Cycles     2/3/2024 - 2/3/2024 Chemotherapy    Fulvestrant, 28 Day Cycles     3/5/2024 - 3/5/2024 Chemotherapy    PACLitaxel, Weekly Cycles     3/8/2024 -  Chemotherapy    Sacituzumab govitecan, 21 Day Cycles            62 yo F with breast cancer who presents to [facility] for direct admission for hyperbilirubinemia.     Plan:  - HDS  - labs significant for known transaminitis/hyperbilirubinemia  - 1L NS for dehydration 2/2 poor po intake    Dispo:  - admit to inpatient for management and evaluation of elevated LFTs/concern for obstructive process            Kelsie Matias, APRN-CNP

## 2024-03-08 NOTE — PROGRESS NOTES
Pharmacy Medication History Review    Chio Turner is a 63 y.o. female admitted for Cancer (CMS/[facility]). Pharmacy reviewed the patient's [id number] medications and allergies for accuracy.    The list below reflects the updated PTA list. Comments regarding how patient may be taking medications differently can be found in the Admit Orders Activity  Prior to Admission Medications   Prescriptions Last Dose Informant   OLANZapine (ZyPREXA) 5 mg tablet Not Taking Self   Sig: Take 1 tablet (5 mg) by mouth once daily at bedtime. For 4 days starting the evening of treatment   Patient not taking: Reported on 3/8/2024   PaxiL 10 mg tablet 3/7/2024 Self   Sig: Take 1 tablet (10 mg) by mouth once daily.   amLODIPine (Norvasc) 5 mg tablet 3/7/2024 Self   Sig: Take 1 tablet (5 mg) by mouth once daily.   chlorthalidone (Hygroton) 25 mg tablet 3/7/2024 Self   Sig: Take 1 tablet (25 mg) by mouth once daily.   dexAMETHasone (Decadron) 4 mg tablet 3/8/2024 Self   Sig: Take 1 tablet (4 mg) by mouth 4 times a day.   Patient taking differently: Take 1 tablet (4 mg) by mouth 2 times a day.   dexAMETHasone (Decadron) 4 mg tablet Not Taking Self   Sig: Take 2 tablets (8 mg) by mouth once daily. For 3 days starting the day after treatment.   Patient not taking: Reported on 3/8/2024           levothyroxine (Synthroid, Levoxyl) 100 mcg tablet 3/8/2024 Self   Sig: Take 1 tablet (100 mcg) by mouth once daily in the morning. Take before meals.   loperamide (Imodium) 2 mg capsule Unknown Self   Sig: Take 2 capsules (4 mg) by mouth with the first episode of diarrhea and 1 capsule (2 mg) by mouth with any additional episodes. Maximum 8 capsules (16 mg) per day.   ondansetron (Zofran) 8 mg tablet Unknown Self   Sig: Take 1 tablet (8 mg) by mouth every 8 hours if needed for nausea or vomiting.   potassium chloride CR 20 mEq ER tablet 3/7/2024 Self   Sig: Take 2 tablets (40 mEq) by mouth twice a day.   prochlorperazine (Compazine) 10 mg tablet  Unknown Self   Sig: Take 1 tablet (10 mg) by mouth every 6 hours if needed for nausea or vomiting.   ribociclib (Kisqali) 3 x 200 mg tablet therapy pack Not Taking Self   Sig: Take 3 tablets (600 mg total) by mouth in the morning for 21 days on, followed by 7 days off per 28-day treatment cycle. Repeat cycle every 28 days.   Patient not taking: Reported on 3/8/2024      Facility-Administered Medications: None        The list below reflects the updated allergy list. Please review each documented allergy for additional clarification and justification.  Allergies  Reviewed by Neva Larios RN on 3/8/2024        Severity Reactions Comments    Penicillins Low Hives, Unknown             Patient declines M2B at discharge. Pharmacy has been updated to [facility] [place].    Sources:    -patient interview  -outpatient pharmacy dispense history with [facility]  -Care Everywhere medication lists  -OARRS  -heme onc office visit notes 3/5&3/8  -neurosurgery office visit note 3/6    Below are additional concerns with the patient's PTA list:    -dexamethasone 4mg-  patient states dose was decreased to 4mg twice a day (6am/6pm) on 3/6/24  -patient said not currently taking Kisqali (last filled 1/31 x 28 day supply)  -patient has decided to stop tumeric and all OTC meds per advice of medical team (MVI, magnesium, vitamin D, calcium/vitamin D)    José Miguel Ramos, PharmD  Transitions of Care Pharmacist  [facility]  Please reach out via Secure Chat for questions, or if no response call [employer] or [facility]

## 2024-03-09 ENCOUNTER — APPOINTMENT (OUTPATIENT)
Dept: RADIOLOGY | Facility: HOSPITAL | Age: 64
DRG: 436 | End: 2024-03-09
Payer: MEDICARE

## 2024-03-09 LAB
AFP SERPL-MCNC: <4 NG/ML (ref 0–9)
ALBUMIN SERPL BCP-MCNC: 3.2 G/DL (ref 3.4–5)
ALP SERPL-CCNC: 406 U/L (ref 33–136)
ALT SERPL W P-5'-P-CCNC: 380 U/L (ref 7–45)
ANION GAP SERPL CALC-SCNC: 14 MMOL/L (ref 10–20)
APTT PPP: 26 SECONDS (ref 27–38)
AST SERPL W P-5'-P-CCNC: 201 U/L (ref 9–39)
BASOPHILS # BLD MANUAL: 0 X10*3/UL (ref 0–0.1)
BASOPHILS NFR BLD MANUAL: 0 %
BILIRUB DIRECT SERPL-MCNC: 2 MG/DL (ref 0–0.3)
BILIRUB SERPL-MCNC: 4.4 MG/DL (ref 0–1.2)
BUN SERPL-MCNC: 28 MG/DL (ref 6–23)
CALCIUM SERPL-MCNC: 9 MG/DL (ref 8.6–10.6)
CHLORIDE SERPL-SCNC: 98 MMOL/L (ref 98–107)
CO2 SERPL-SCNC: 26 MMOL/L (ref 21–32)
CREAT SERPL-MCNC: 0.54 MG/DL (ref 0.5–1.05)
EGFRCR SERPLBLD CKD-EPI 2021: >90 ML/MIN/1.73M*2
EOSINOPHIL # BLD MANUAL: 0 X10*3/UL (ref 0–0.7)
EOSINOPHIL NFR BLD MANUAL: 0 %
ERYTHROCYTE [DISTWIDTH] IN BLOOD BY AUTOMATED COUNT: 25.2 % (ref 11.5–14.5)
GLUCOSE SERPL-MCNC: 185 MG/DL (ref 74–99)
HAV AB SER QL IA: REACTIVE
HBV CORE AB SER QL: NONREACTIVE
HBV SURFACE AB SER-ACNC: <3.1 MIU/ML
HBV SURFACE AG SERPL QL IA: NONREACTIVE
HCT VFR BLD AUTO: 33.2 % (ref 36–46)
HCV AB SER QL: NONREACTIVE
HGB BLD-MCNC: 11.5 G/DL (ref 12–16)
HYPOCHROMIA BLD QL SMEAR: ABNORMAL
IMM GRANULOCYTES # BLD AUTO: 0.16 X10*3/UL (ref 0–0.7)
IMM GRANULOCYTES NFR BLD AUTO: 1.3 % (ref 0–0.9)
INR PPP: 1 (ref 0.9–1.1)
LYMPHOCYTES # BLD MANUAL: 0.21 X10*3/UL (ref 1.2–4.8)
LYMPHOCYTES NFR BLD MANUAL: 1.7 %
MAGNESIUM SERPL-MCNC: 2.19 MG/DL (ref 1.6–2.4)
MCH RBC QN AUTO: 27.8 PG (ref 26–34)
MCHC RBC AUTO-ENTMCNC: 34.6 G/DL (ref 32–36)
MCV RBC AUTO: 80 FL (ref 80–100)
MONOCYTES # BLD MANUAL: 0.85 X10*3/UL (ref 0.1–1)
MONOCYTES NFR BLD MANUAL: 7 %
NEUTS SEG # BLD MANUAL: 11.14 X10*3/UL (ref 1.2–7)
NEUTS SEG NFR BLD MANUAL: 91.3 %
NRBC BLD-RTO: 0.7 /100 WBCS (ref 0–0)
OVALOCYTES BLD QL SMEAR: ABNORMAL
PLATELET # BLD AUTO: 164 X10*3/UL (ref 150–450)
POTASSIUM SERPL-SCNC: 4.3 MMOL/L (ref 3.5–5.3)
PROT SERPL-MCNC: 5.7 G/DL (ref 6.4–8.2)
PROTHROMBIN TIME: 11.2 SECONDS (ref 9.8–12.8)
RBC # BLD AUTO: 4.14 X10*6/UL (ref 4–5.2)
RBC MORPH BLD: ABNORMAL
SODIUM SERPL-SCNC: 134 MMOL/L (ref 136–145)
TOTAL CELLS COUNTED BLD: 115
WBC # BLD AUTO: 12.2 X10*3/UL (ref 4.4–11.3)

## 2024-03-09 PROCEDURE — 2500000002 HC RX 250 W HCPCS SELF ADMINISTERED DRUGS (ALT 637 FOR MEDICARE OP, ALT 636 FOR OP/ED): Mod: MUE

## 2024-03-09 PROCEDURE — 82390 ASSAY OF CERULOPLASMIN: CPT

## 2024-03-09 PROCEDURE — 99233 SBSQ HOSP IP/OBS HIGH 50: CPT

## 2024-03-09 PROCEDURE — 87522 HEPATITIS C REVRS TRNSCRPJ: CPT

## 2024-03-09 PROCEDURE — 86381 MITOCHONDRIAL ANTIBODY EACH: CPT

## 2024-03-09 PROCEDURE — 86708 HEPATITIS A ANTIBODY: CPT

## 2024-03-09 PROCEDURE — 76705 ECHO EXAM OF ABDOMEN: CPT | Performed by: STUDENT IN AN ORGANIZED HEALTH CARE EDUCATION/TRAINING PROGRAM

## 2024-03-09 PROCEDURE — 87040 BLOOD CULTURE FOR BACTERIA: CPT

## 2024-03-09 PROCEDURE — 82248 BILIRUBIN DIRECT: CPT

## 2024-03-09 PROCEDURE — 36415 COLL VENOUS BLD VENIPUNCTURE: CPT

## 2024-03-09 PROCEDURE — 87340 HEPATITIS B SURFACE AG IA: CPT

## 2024-03-09 PROCEDURE — 86704 HEP B CORE ANTIBODY TOTAL: CPT

## 2024-03-09 PROCEDURE — 86803 HEPATITIS C AB TEST: CPT

## 2024-03-09 PROCEDURE — 85610 PROTHROMBIN TIME: CPT

## 2024-03-09 PROCEDURE — 85027 COMPLETE CBC AUTOMATED: CPT

## 2024-03-09 PROCEDURE — 80053 COMPREHEN METABOLIC PANEL: CPT

## 2024-03-09 PROCEDURE — 76705 ECHO EXAM OF ABDOMEN: CPT

## 2024-03-09 PROCEDURE — 82105 ALPHA-FETOPROTEIN SERUM: CPT

## 2024-03-09 PROCEDURE — 86706 HEP B SURFACE ANTIBODY: CPT

## 2024-03-09 PROCEDURE — 86015 ACTIN ANTIBODY EACH: CPT

## 2024-03-09 PROCEDURE — 2500000001 HC RX 250 WO HCPCS SELF ADMINISTERED DRUGS (ALT 637 FOR MEDICARE OP)

## 2024-03-09 PROCEDURE — 85007 BL SMEAR W/DIFF WBC COUNT: CPT

## 2024-03-09 PROCEDURE — 2500000004 HC RX 250 GENERAL PHARMACY W/ HCPCS (ALT 636 FOR OP/ED)

## 2024-03-09 PROCEDURE — 83735 ASSAY OF MAGNESIUM: CPT

## 2024-03-09 PROCEDURE — 2060000001 HC INTERMEDIATE ICU ROOM DAILY

## 2024-03-09 RX ADMIN — CHLORTHALIDONE 25 MG: 25 TABLET ORAL at 09:43

## 2024-03-09 RX ADMIN — AMLODIPINE BESYLATE 5 MG: 5 TABLET ORAL at 09:42

## 2024-03-09 RX ADMIN — PAROXETINE HYDROCHLORIDE 10 MG: 10 TABLET, FILM COATED ORAL at 09:43

## 2024-03-09 RX ADMIN — DEXAMETHASONE 4 MG: 4 TABLET ORAL at 18:30

## 2024-03-09 RX ADMIN — LEVOTHYROXINE SODIUM 100 MCG: 100 TABLET ORAL at 06:11

## 2024-03-09 RX ADMIN — POTASSIUM CHLORIDE 40 MEQ: 1500 TABLET, EXTENDED RELEASE ORAL at 09:43

## 2024-03-09 RX ADMIN — DEXAMETHASONE 4 MG: 4 TABLET ORAL at 06:11

## 2024-03-09 ASSESSMENT — PAIN - FUNCTIONAL ASSESSMENT: PAIN_FUNCTIONAL_ASSESSMENT: 0-10

## 2024-03-09 ASSESSMENT — COGNITIVE AND FUNCTIONAL STATUS - GENERAL
MOBILITY SCORE: 19
HELP NEEDED FOR BATHING: A LITTLE
DRESSING REGULAR UPPER BODY CLOTHING: A LITTLE
DRESSING REGULAR LOWER BODY CLOTHING: A LITTLE
MOVING TO AND FROM BED TO CHAIR: A LITTLE
CLIMB 3 TO 5 STEPS WITH RAILING: A LOT
TOILETING: A LITTLE
STANDING UP FROM CHAIR USING ARMS: A LITTLE
WALKING IN HOSPITAL ROOM: A LITTLE
DAILY ACTIVITIY SCORE: 20

## 2024-03-09 ASSESSMENT — PAIN SCALES - GENERAL
PAINLEVEL_OUTOF10: 0 - NO PAIN

## 2024-03-09 NOTE — PROGRESS NOTES
Internal Medicine Progress Note   [facility]  March 9, 2024    Patient: Chio Turner    Medical Record: [id number]    Chio Turner is a 63 y.o. female with a past medical history of stage IV right-sided breast ductal carcinoma (ER+ (90%), [place] + (70%), HER-2-) diagnosed in June 2015 following with Dr. Kahn with metastatic disease to the lungs and liver and now brain admitted on 3/8/24 from infusion clinic with elevated LFTs.      Subjective   No acute events overnight. This morning patient states that she feels well and at her usual state of health. Had 1 BM this AM. Eating well. No other concerns. Denies any fevers, chills, shortness of breath, chest pain, abdominal pain, nausea, vomiting.    Medications   Medications:   Scheduled medications  amLODIPine, 5 mg, oral, Daily  chlorthalidone, 25 mg, oral, Daily  dexAMETHasone, 4 mg, oral, BID  levothyroxine, 100 mcg, oral, Daily before breakfast  PARoxetine, 10 mg, oral, Daily  potassium chloride CR, 40 mEq, oral, Daily      Continuous medications     PRN medications  PRN medications: loperamide, ondansetron, prochlorperazine     Objective   Vitals  Visit Vitals  /82 (BP Location: Left arm, Patient Position: Lying)   Pulse 58   Temp 36.6 °C (97.9 °F) (Temporal)   Resp 16        Intake/Output  I/O last 3 completed shifts:  In: 10 [P.O.:10]  Out: 0     Physical Exam  Constitutional:       Appearance: Normal appearance.   Eyes:      Extraocular Movements: Extraocular movements intact.      Conjunctiva/sclera: Conjunctivae normal.   Cardiovascular:      Rate and Rhythm: Normal rate and regular rhythm.      Pulses: Normal pulses.      Heart sounds: Normal heart sounds.   Pulmonary:      Effort: Pulmonary effort is normal.      Breath sounds: Normal breath sounds.   Abdominal:      General: Abdomen is flat. Bowel sounds are normal.      Palpations: Abdomen is soft.   Neurological:      General: No focal deficit present.      Mental  Status: She is alert and oriented to person, place, and time.   Psychiatric:         Mood and Affect: Mood normal.         Thought Content: Thought content normal.        Labs  Results from last 7 days   Lab Units 03/08/24  0851 03/05/24  1206   WBC AUTO x10*3/uL 16.7* 17.0*   HEMOGLOBIN g/dL 12.5 12.5   HEMATOCRIT % 36.3 36.2   PLATELETS AUTO x10*3/uL 211 230     Results from last 7 days   Lab Units 03/08/24  0851 03/05/24  1206   SODIUM mmol/L 134* 132*   POTASSIUM mmol/L 4.2 4.2   CO2 mmol/L 27 28   ANION GAP mmol/L 14 14   BUN mg/dL 30* 30*   CREATININE mg/dL 0.60 0.57   GLUCOSE mg/dL 236* 318*   EGFR mL/min/1.73m*2 >90 >90      Results from last 7 days   Lab Units 03/08/24  0851 03/05/24  1206   ALT U/L 435* 323*   AST U/L 236* 200*   ALK PHOS U/L 431* 395*          Imaging  CT ANGIO HEAD AND NECK W AND WO IV CONTRAST 2/21/24:  1. 2 hyperdense cerebellar masses, the largest measures 10 x 14 mm,  most consistent with metastatic disease. The larger lesion is located  in the cerebellar vermis and associated with mild edema. No  significant mass effect. Questionable small mass involving the right  lentiform nucleus. MRI brain with contrast is more sensitive  evaluation for metastatic disease and may be considered.     2. No significant stenosis of the cervical carotid or vertebral  arteries.     3. No intracranial major branch occlusion or hemodynamically  significant stenosis.     4. Partially imaged pulmonary/intrathoracic metastatic disease.     MR BRAIN 2/23/24:  Hemorrhagic lesions in the cerebellar vermis and right parietal lobe  adjacent to the splenium of the corpus callosum along with a few  small enhancing cerebellar lesions are nonspecific. Consider  hemorrhagic metastases as well as hemorrhages due to slow flow  vascular malformations.     MRCP 2/22/24:  On the most recent comparison exam of the abdomen and pelvis, CT with  contrast 15 December 2023, there were just a few liver lesions  suspect for  metastases; today, too numerous to count new variably  sized liver metastases, likely 100 or more.       No other contributory abnormalities      No biliary duct dilation      No choledocholithiasis      No pancreatic duct dilation      Edematous gallbladder wall thickening, probably due to hepatic  dysfunction. No gallstones or gallbladder dilation      Away from the liver, no other metastatic disease in the abdomen      No stigmata of carcinomatosis such as free fluid or omental or  peritoneal implants      Known right adrenal myelolipoma    RUQ US 3/9/24:  1. Hepatomegaly with innumerable metastatic lesions that are at least  unchanged, if not slightly progressed, noting intrinsic limitations  with regard to intermodality comparison.  2. Unchanged thickened gallbladder wall without distension,  pericholecystic fluid or cholelithiasis due to hepatocellular disease.         Assessment/Plan   Chio Turner is a 63 y.o. female with a past medical history of stage IV right-sided breast ductal carcinoma (ER+ (90%), [place] + (70%), HER-2-) diagnosed in June 2015 with metastatic disease to the lungs and liver and now brain admitted on 3/8/24 from infusion clinic with elevated LFTs. Likely that elevated transaminases are in the setting of worsening metastatic disease vs biliary obstruction vs DILI vs viral etiology. R-factor score 3.0 showing mixed hepatocellular and cholestatic injury. Patient currently asymptomatic and reports being in her usual state of health, no abdominal pain, n/v, f/c. Labs, RUQ US pending.      Updates 3/9:   - Transaminases downtrending, Tbili stable, INR 1.0  - RUQ showing similar findings to MRCP 2/22/24 with hepatomegaly and innumerable metastatic lesions   - Patient continues to remain asymptomatic  - Hepatology consulted  - Hepatitis serologies neg    #Elevated LFTs in the setting of metastatic disease to liver   #Possible visceral crisis   #Hyperbilirubinemia   -R-factor score 3.0; showing  "mixed hepatocellular and cholestatic injury   -MRCP at OSH 2/27/24 showed \"too numerous to count new variably sized liver metastases, likely 100 or more \"  -Was taking Tumeric supplement, other vitamin supplements at home but discontinued this during the week; no other new medications reported  - Etiologies include worsening metastatic disease with visceral crisis vs biliary obstruction vs DILI vs viral etiology  - RUQ showing similar findings to MRCP 2/22/24 with hepatomegaly and innumerable metastatic lesions   - Patient continues to remain asymptomatic  - Hepatology consulted for visceral crisis vs possible obstruction, we appreciate the recommendations   - Hepatitis serologies neg  - Direct bilirubin 1.7, coags wnl   Plan:    - Anti-sm muscle antibody, ceruloplasmin, AFP, anti-mitochondrial antibody pending   - Utox pending, blood cultures pending   - Hepatology consulted, we appreciate the recommendations      #Leukocytosis iso steroid usage, improving   - Likely steroid induced, pt has been on steroids since admission at [facility] with findings of metastatic disease to the brain   - Low suspicion for infectious etiology as patient is asymptomatic, HDS    - CTM   - Bcx pending      #Stage IV right-sided breast ductal carcinoma (ER+, [place]+, HER2-) diagnosed in June 2015 with metastatic disease to the lungs, liver, brain  - Follows with Dr. Kahn, last seen on 3/5/24   - Planned to have first dose Sacituzumab on 03/08/2024 if liver enzymes trending down, but patient presented with elevated LFTs      #Cerebellar mass likely from metastatic in etiology  -MRI brain showed Hemorrhagic lesions in the cerebellar vermis and right parietal lobe adjacent to the splenium of the corpus callosum along with a few small enhancing cerebellar lesions are nonspecific. Consider hemorrhagic metastases as well as hemorrhages due to slow flow vascular malformations.  - Patient saw NSG 3/5 - advised to wean dexamethasone from 6 mg 4 " times daily, to 6 mg twice daily.  Dr. Contreras may further reduce this dosage and then coordinate possible radiosurgery if he deems her to be an appropriate candidate.   - Patient reports taking Decadron 4mg BID  -No further lightheadedness, weakness, dizziness, or other sx   Plan:   - Continue Decadron 4mg BID   - Holding any AC for now iso possible hemorrhagic mets on MRI   -PT/OT eval, follows with outpatient PT/OT      #Hypothyroidism   - Continue levothyroxine 100 mcg orally daily      #Hypertension  -amlodipine 5 mg orally daily  -25 mg orally daily  -HCTZ for now  -Ctm Bps      #Anxiety  -Continue Paxil and Atarax     Fluids: Replete PRN  Electrolytes: Keep mg >2, phos >3  and K >4  Nutrition:  Adult diet Regular   Antimicrobials:   DVT PPX: SCD's in the setting of hemorrhagic mets to brain   Bowel Care: Miralax  Lines: PIV     Code Status: DNR/DNI - OK for ICU (confirmed on admission)  NOK:  Primary Emergency Contact: Daniel Turner, Home Phone: [phone]        Patient and plan discussed with attending physician Dr. Cannon.       SIGNATURE: Yovana Stewart MD PATIENT NAME: Chio Turner   DATE: March 9, 2024 MRN: [id number]

## 2024-03-09 NOTE — CARE PLAN
The patient's goals for the shift include      The clinical goals for the shift include pt will remain safe and free from injury during shift      Problem: Pain - Adult  Goal: Verbalizes/displays adequate comfort level or baseline comfort level  Outcome: Progressing     Problem: Safety - Adult  Goal: Free from fall injury  Outcome: Progressing     Problem: Discharge Planning  Goal: Discharge to home or other facility with appropriate resources  Outcome: Progressing     Problem: Chronic Conditions and Co-morbidities  Goal: Patient's chronic conditions and co-morbidity symptoms are monitored and maintained or improved  Outcome: Progressing     Problem: Fall/Injury  Goal: Not fall by end of shift  Outcome: Progressing  Goal: Be free from injury by end of the shift  Outcome: Progressing  Goal: Verbalize understanding of personal risk factors for fall in the hospital  Outcome: Progressing  Goal: Verbalize understanding of risk factor reduction measures to prevent injury from fall in the home  Outcome: Progressing  Goal: Use assistive devices by end of the shift  Outcome: Progressing  Goal: Pace activities to prevent fatigue by end of the shift  Outcome: Progressing

## 2024-03-10 LAB
ALBUMIN SERPL BCP-MCNC: 3 G/DL (ref 3.4–5)
ALP SERPL-CCNC: 447 U/L (ref 33–136)
ALT SERPL W P-5'-P-CCNC: 399 U/L (ref 7–45)
ANION GAP SERPL CALC-SCNC: 14 MMOL/L (ref 10–20)
AST SERPL W P-5'-P-CCNC: 229 U/L (ref 9–39)
BASO STIPL BLD QL SMEAR: PRESENT
BASOPHILS # BLD MANUAL: 0 X10*3/UL (ref 0–0.1)
BASOPHILS NFR BLD MANUAL: 0 %
BILIRUB SERPL-MCNC: 4.7 MG/DL (ref 0–1.2)
BUN SERPL-MCNC: 26 MG/DL (ref 6–23)
CALCIUM SERPL-MCNC: 8.9 MG/DL (ref 8.6–10.6)
CERULOPLASMIN SERPL-MCNC: 27.4 MG/DL (ref 20–60)
CHLORIDE SERPL-SCNC: 98 MMOL/L (ref 98–107)
CO2 SERPL-SCNC: 26 MMOL/L (ref 21–32)
CREAT SERPL-MCNC: 0.46 MG/DL (ref 0.5–1.05)
DACRYOCYTES BLD QL SMEAR: ABNORMAL
EGFRCR SERPLBLD CKD-EPI 2021: >90 ML/MIN/1.73M*2
EOSINOPHIL # BLD MANUAL: 0 X10*3/UL (ref 0–0.7)
EOSINOPHIL NFR BLD MANUAL: 0 %
ERYTHROCYTE [DISTWIDTH] IN BLOOD BY AUTOMATED COUNT: 24.5 % (ref 11.5–14.5)
GLUCOSE SERPL-MCNC: 138 MG/DL (ref 74–99)
HCT VFR BLD AUTO: 30.5 % (ref 36–46)
HCV RNA SERPL NAA+PROBE-ACNC: NOT DETECTED K[IU]/ML
HCV RNA SERPL NAA+PROBE-LOG IU: NORMAL {LOG_IU}/ML
HGB BLD-MCNC: 10.5 G/DL (ref 12–16)
HYPOCHROMIA BLD QL SMEAR: ABNORMAL
IMM GRANULOCYTES # BLD AUTO: 0.18 X10*3/UL (ref 0–0.7)
IMM GRANULOCYTES NFR BLD AUTO: 1.4 % (ref 0–0.9)
LYMPHOCYTES # BLD MANUAL: 0.67 X10*3/UL (ref 1.2–4.8)
LYMPHOCYTES NFR BLD MANUAL: 5.2 %
MAGNESIUM SERPL-MCNC: 2.07 MG/DL (ref 1.6–2.4)
MCH RBC QN AUTO: 27.4 PG (ref 26–34)
MCHC RBC AUTO-ENTMCNC: 34.4 G/DL (ref 32–36)
MCV RBC AUTO: 80 FL (ref 80–100)
MONOCYTES # BLD MANUAL: 0.67 X10*3/UL (ref 0.1–1)
MONOCYTES NFR BLD MANUAL: 5.2 %
NEUTS SEG # BLD MANUAL: 11.56 X10*3/UL (ref 1.2–7)
NEUTS SEG NFR BLD MANUAL: 89.6 %
NRBC BLD-RTO: 0.9 /100 WBCS (ref 0–0)
OVALOCYTES BLD QL SMEAR: ABNORMAL
PLATELET # BLD AUTO: 128 X10*3/UL (ref 150–450)
PLATELET CLUMP BLD QL SMEAR: PRESENT
POTASSIUM SERPL-SCNC: 3.6 MMOL/L (ref 3.5–5.3)
PROT SERPL-MCNC: 5 G/DL (ref 6.4–8.2)
RBC # BLD AUTO: 3.83 X10*6/UL (ref 4–5.2)
RBC MORPH BLD: ABNORMAL
SCHISTOCYTES BLD QL SMEAR: ABNORMAL
SODIUM SERPL-SCNC: 134 MMOL/L (ref 136–145)
TARGETS BLD QL SMEAR: ABNORMAL
TOTAL CELLS COUNTED BLD: 116
WBC # BLD AUTO: 12.9 X10*3/UL (ref 4.4–11.3)

## 2024-03-10 PROCEDURE — 2060000001 HC INTERMEDIATE ICU ROOM DAILY

## 2024-03-10 PROCEDURE — 83735 ASSAY OF MAGNESIUM: CPT

## 2024-03-10 PROCEDURE — 2500000004 HC RX 250 GENERAL PHARMACY W/ HCPCS (ALT 636 FOR OP/ED)

## 2024-03-10 PROCEDURE — 80053 COMPREHEN METABOLIC PANEL: CPT

## 2024-03-10 PROCEDURE — 2500000001 HC RX 250 WO HCPCS SELF ADMINISTERED DRUGS (ALT 637 FOR MEDICARE OP)

## 2024-03-10 PROCEDURE — 87040 BLOOD CULTURE FOR BACTERIA: CPT

## 2024-03-10 PROCEDURE — 99233 SBSQ HOSP IP/OBS HIGH 50: CPT

## 2024-03-10 PROCEDURE — 85007 BL SMEAR W/DIFF WBC COUNT: CPT

## 2024-03-10 PROCEDURE — 99222 1ST HOSP IP/OBS MODERATE 55: CPT | Performed by: STUDENT IN AN ORGANIZED HEALTH CARE EDUCATION/TRAINING PROGRAM

## 2024-03-10 PROCEDURE — 2500000002 HC RX 250 W HCPCS SELF ADMINISTERED DRUGS (ALT 637 FOR MEDICARE OP, ALT 636 FOR OP/ED)

## 2024-03-10 PROCEDURE — 36415 COLL VENOUS BLD VENIPUNCTURE: CPT

## 2024-03-10 PROCEDURE — 2500000004 HC RX 250 GENERAL PHARMACY W/ HCPCS (ALT 636 FOR OP/ED): Performed by: INTERNAL MEDICINE

## 2024-03-10 PROCEDURE — 85027 COMPLETE CBC AUTOMATED: CPT

## 2024-03-10 RX ORDER — CEFAZOLIN SODIUM 2 G/100ML
2 INJECTION, SOLUTION INTRAVENOUS EVERY 8 HOURS
Status: DISCONTINUED | OUTPATIENT
Start: 2024-03-10 | End: 2024-03-10

## 2024-03-10 RX ORDER — VANCOMYCIN HYDROCHLORIDE 1 G/20ML
INJECTION, POWDER, LYOPHILIZED, FOR SOLUTION INTRAVENOUS DAILY PRN
Status: DISCONTINUED | OUTPATIENT
Start: 2024-03-10 | End: 2024-03-11

## 2024-03-10 RX ADMIN — CEFAZOLIN SODIUM 2 G: 2 INJECTION, SOLUTION INTRAVENOUS at 12:50

## 2024-03-10 RX ADMIN — CEFAZOLIN SODIUM 2 G: 2 INJECTION, SOLUTION INTRAVENOUS at 19:59

## 2024-03-10 RX ADMIN — DEXAMETHASONE 4 MG: 4 TABLET ORAL at 17:31

## 2024-03-10 RX ADMIN — DEXAMETHASONE 4 MG: 4 TABLET ORAL at 06:38

## 2024-03-10 RX ADMIN — VANCOMYCIN HYDROCHLORIDE 1250 MG: 1.25 INJECTION, POWDER, LYOPHILIZED, FOR SOLUTION INTRAVENOUS at 12:00

## 2024-03-10 RX ADMIN — CHLORTHALIDONE 25 MG: 25 TABLET ORAL at 08:48

## 2024-03-10 RX ADMIN — PAROXETINE HYDROCHLORIDE 10 MG: 10 TABLET, FILM COATED ORAL at 08:48

## 2024-03-10 RX ADMIN — LEVOTHYROXINE SODIUM 100 MCG: 100 TABLET ORAL at 06:38

## 2024-03-10 RX ADMIN — POTASSIUM CHLORIDE 40 MEQ: 1500 TABLET, EXTENDED RELEASE ORAL at 08:48

## 2024-03-10 RX ADMIN — AMLODIPINE BESYLATE 5 MG: 5 TABLET ORAL at 08:48

## 2024-03-10 ASSESSMENT — COGNITIVE AND FUNCTIONAL STATUS - GENERAL
MOBILITY SCORE: 20
DAILY ACTIVITIY SCORE: 24
WALKING IN HOSPITAL ROOM: A LITTLE
MOBILITY SCORE: 24
DAILY ACTIVITIY SCORE: 23
DRESSING REGULAR LOWER BODY CLOTHING: A LITTLE
STANDING UP FROM CHAIR USING ARMS: A LITTLE
CLIMB 3 TO 5 STEPS WITH RAILING: A LITTLE
MOVING TO AND FROM BED TO CHAIR: A LITTLE

## 2024-03-10 ASSESSMENT — PAIN SCALES - GENERAL
PAINLEVEL_OUTOF10: 0 - NO PAIN

## 2024-03-10 ASSESSMENT — PAIN - FUNCTIONAL ASSESSMENT
PAIN_FUNCTIONAL_ASSESSMENT: 0-10
PAIN_FUNCTIONAL_ASSESSMENT: 0-10

## 2024-03-10 NOTE — CONSULTS
"Vancomycin Dosing by Pharmacy- INITIAL    Chio Turner is a 63 y.o. year old female who Pharmacy has been consulted for vancomycin dosing for line infections. Based on the patient's indication and renal status this patient will be dosed based on a goal AUC of 400-600.     Renal function is currently stable.    Visit Vitals  /85 (BP Location: Left arm, Patient Position: Sitting)   Pulse 68   Temp 36.1 °C (97 °F) (Temporal)   Resp 16        Lab Results   Component Value Date    CREATININE 0.46 (L) 03/10/2024    CREATININE 0.54 03/09/2024    CREATININE 0.60 03/08/2024    CREATININE 0.57 03/05/2024        Patient weight is No results found for: \"PTWEIGHT\"    No results found for: \"CULTURE\"     I/O last 3 completed shifts:  In: 10 [P.O.:10]  Out: 1 [Stool:1]  [unfilled]    No results found for: \"PATIENTTEMP\"       Assessment/Plan     Patient will not be given a loading dose.  Will initiate vancomycin maintenance,  1250 mg every 12 hours.    This dosing regimen is predicted by [employer] to result in the following pharmacokinetic parameters:  Loading dose: N/A  Regimen: 1250 mg IV every 12 hours.  Start time: 11:34 on 03/10/2024  Exposure target: AUC24 (range)400-600 mg/L.hr   AUC24,ss: 459 mg/L.hr  Probability of AUC24 > 400: 64 %  Ctrough,ss: 13.1 mg/L  Probability of Ctrough,ss > 20: 20 %  Probability of nephrotoxicity (Lodise ALISON 2009): 8 %    Follow-up level will be ordered on 3/11/24 at 1st AM labs unless clinically indicated sooner.  Will continue to monitor renal function daily while on vancomycin and order serum creatinine at least every 48 hours if not already ordered.  Follow for continued vancomycin needs, clinical response, and signs/symptoms of toxicity.       Adrian Holland, PharmD       "

## 2024-03-10 NOTE — CARE PLAN
The patient's goals for the shift include      The clinical goals for the shift include Patient will remain free from falls and injury throughout shift.      Problem: Pain - Adult  Goal: Verbalizes/displays adequate comfort level or baseline comfort level  Outcome: Progressing     Problem: Safety - Adult  Goal: Free from fall injury  Outcome: Progressing     Problem: Discharge Planning  Goal: Discharge to home or other facility with appropriate resources  Outcome: Progressing     Problem: Chronic Conditions and Co-morbidities  Goal: Patient's chronic conditions and co-morbidity symptoms are monitored and maintained or improved  Outcome: Progressing     Problem: Fall/Injury  Goal: Not fall by end of shift  Outcome: Progressing  Goal: Be free from injury by end of the shift  Outcome: Progressing  Goal: Verbalize understanding of personal risk factors for fall in the hospital  Outcome: Progressing  Goal: Verbalize understanding of risk factor reduction measures to prevent injury from fall in the home  Outcome: Progressing  Goal: Use assistive devices by end of the shift  Outcome: Progressing  Goal: Pace activities to prevent fatigue by end of the shift  Outcome: Progressing     Problem: Infection related to problem list condition  Goal: Infection will resolve through treatment  Outcome: Progressing

## 2024-03-10 NOTE — PROGRESS NOTES
Internal Medicine Progress Note   [facility]  March 10, 2024    Patient: Chio Turner    Medical Record: [id number]    Chio Turner is a 63 y.o. female with a past medical history of stage IV right-sided breast ductal carcinoma (ER+ (90%), [place] + (70%), HER-2-) diagnosed in June 2015 following with Dr. Kahn with metastatic disease to the lungs and liver and now brain admitted on 3/8/24 from infusion clinic with elevated LFTs.      Subjective   Patient feeling well this morning. No acute complaints, just would like to know significance of elevated LFTs and possible treatments.    Medications   Medications:   Scheduled medications  amLODIPine, 5 mg, oral, Daily  ceFAZolin, 2 g, intravenous, q8h  chlorthalidone, 25 mg, oral, Daily  dexAMETHasone, 4 mg, oral, BID  levothyroxine, 100 mcg, oral, Daily before breakfast  PARoxetine, 10 mg, oral, Daily  potassium chloride CR, 40 mEq, oral, Daily  vancomycin, 1,250 mg, intravenous, q12h      Continuous medications     PRN medications  PRN medications: loperamide, ondansetron, prochlorperazine, vancomycin     Objective   Vitals  Visit Vitals  /85 (BP Location: Left arm, Patient Position: Sitting)   Pulse 68   Temp 36.1 °C (97 °F) (Temporal)   Resp 16        Intake/Output  I/O last 3 completed shifts:  In: 10 [P.O.:10]  Out: 1 [Stool:1]    Physical Exam  Constitutional:       Appearance: Normal appearance.   Eyes:      Extraocular Movements: Extraocular movements intact.      Conjunctiva/sclera: Conjunctivae normal.   Cardiovascular:      Rate and Rhythm: Normal rate and regular rhythm.      Pulses: Normal pulses.      Heart sounds: Normal heart sounds.   Pulmonary:      Effort: Pulmonary effort is normal.      Breath sounds: Normal breath sounds.   Abdominal:      General: Abdomen is flat. Bowel sounds are normal.      Palpations: Abdomen is soft.   Neurological:      General: No focal deficit present.      Mental Status: She is alert  and oriented to person, place, and time.   Psychiatric:         Mood and Affect: Mood normal.         Thought Content: Thought content normal.        Labs  Results from last 7 days   Lab Units 03/10/24  0638 03/09/24  0948 03/08/24  0851   WBC AUTO x10*3/uL 12.9* 12.2* 16.7*   HEMOGLOBIN g/dL 10.5* 11.5* 12.5   HEMATOCRIT % 30.5* 33.2* 36.3   PLATELETS AUTO x10*3/uL 128* 164 211       Results from last 7 days   Lab Units 03/10/24  0638 03/09/24  0948 03/08/24  0851   SODIUM mmol/L 134* 134* 134*   POTASSIUM mmol/L 3.6 4.3 4.2   CO2 mmol/L 26 26 27   ANION GAP mmol/L 14 14 14   BUN mg/dL 26* 28* 30*   CREATININE mg/dL 0.46* 0.54 0.60   GLUCOSE mg/dL 138* 185* 236*   EGFR mL/min/1.73m*2 >90 >90 >90   MAGNESIUM mg/dL 2.07 2.19  --         Results from last 7 days   Lab Units 03/10/24  0638 03/09/24  0948 03/08/24  0851   ALT U/L 399* 380* 435*   AST U/L 229* 201* 236*   ALK PHOS U/L 447* 406* 431*            Imaging  CT ANGIO HEAD AND NECK W AND WO IV CONTRAST 2/21/24:  1. 2 hyperdense cerebellar masses, the largest measures 10 x 14 mm,  most consistent with metastatic disease. The larger lesion is located  in the cerebellar vermis and associated with mild edema. No  significant mass effect. Questionable small mass involving the right  lentiform nucleus. MRI brain with contrast is more sensitive  evaluation for metastatic disease and may be considered.     2. No significant stenosis of the cervical carotid or vertebral  arteries.     3. No intracranial major branch occlusion or hemodynamically  significant stenosis.     4. Partially imaged pulmonary/intrathoracic metastatic disease.     MR BRAIN 2/23/24:  Hemorrhagic lesions in the cerebellar vermis and right parietal lobe  adjacent to the splenium of the corpus callosum along with a few  small enhancing cerebellar lesions are nonspecific. Consider  hemorrhagic metastases as well as hemorrhages due to slow flow  vascular malformations.     MRCP 2/22/24:  On the most  recent comparison exam of the abdomen and pelvis, CT with  contrast 15 December 2023, there were just a few liver lesions  suspect for metastases; today, too numerous to count new variably  sized liver metastases, likely 100 or more.       No other contributory abnormalities      No biliary duct dilation      No choledocholithiasis      No pancreatic duct dilation      Edematous gallbladder wall thickening, probably due to hepatic  dysfunction. No gallstones or gallbladder dilation      Away from the liver, no other metastatic disease in the abdomen      No stigmata of carcinomatosis such as free fluid or omental or  peritoneal implants      Known right adrenal myelolipoma    RUQ US 3/9/24:  1. Hepatomegaly with innumerable metastatic lesions that are at least  unchanged, if not slightly progressed, noting intrinsic limitations  with regard to intermodality comparison.  2. Unchanged thickened gallbladder wall without distension,  pericholecystic fluid or cholelithiasis due to hepatocellular disease.         Assessment/Plan   Chio Turner is a 63 y.o. female with a past medical history of stage IV right-sided breast ductal carcinoma (ER+ (90%), [place] + (70%), HER-2-) diagnosed in June 2015 with metastatic disease to the lungs and liver and now brain admitted on 3/8/24 from infusion clinic with elevated LFTs. Likely that elevated transaminases are in the setting of worsening metastatic disease vs biliary obstruction vs DILI vs viral etiology. R-factor score 3.0 showing mixed hepatocellular and cholestatic injury. Patient currently asymptomatic and reports being in her usual state of health, no abdominal pain, n/v, f/c. Labs, RUQ US pending.      Updates 3/10:   - Bilirubin still increasing, LFTs still elevated  - consulted Hepatology, asked if there is indication for repeat MRCP or possible stent (Hepatitis A ab positive, unclear significance)  - Dr. Kahn will be on [facility] service tomorrow - will ask her to visit  "patient to discuss treatment plan  - blood cultures growing gram positive cocci; collected repeat cultures from peripheral vein and mediport; started Vancomycin and Cefazolin;     #Elevated LFTs in the setting of metastatic disease to liver   #Possible visceral crisis   #Hyperbilirubinemia   -R-factor score 3.0; showing mixed hepatocellular and cholestatic injury   -MRCP at OSH 2/27/24 showed \"too numerous to count new variably sized liver metastases, likely 100 or more \"  -Was taking Tumeric supplement, other vitamin supplements at home but discontinued this during the week; no other new medications reported  - Etiologies include worsening metastatic disease with visceral crisis vs biliary obstruction vs DILI vs viral etiology  - RUQ showing similar findings to MRCP 2/22/24 with hepatomegaly and innumerable metastatic lesions   - Patient continues to remain asymptomatic  - Hepatology consulted for visceral crisis vs possible obstruction, we appreciate the recommendations   - Hepatitis serologies neg  - Direct bilirubin 1.7, coags wnl   - blood cultures with gram positive cocci  Plan:    - Anti-sm muscle antibody, ceruloplasmin, AFP, anti-mitochondrial antibody pending   - Vancomycin and Cefazolin; follow up repeat cultures and speciation  - Hepatology consulted, we appreciate the recommendations      #Leukocytosis iso steroid usage, improving   - Likely steroid induced, pt has been on steroids since admission at [facility] with findings of metastatic disease to the brain   - Low suspicion for infectious etiology as patient is asymptomatic, HDS    - CTM   - Bcx - gram positive cocci; follow up repeat cultures     #Stage IV right-sided breast ductal carcinoma (ER+, [place]+, HER2-) diagnosed in June 2015 with metastatic disease to the lungs, liver, brain  - Follows with Dr. Kahn, last seen on 3/5/24   - Planned to have first dose Sacituzumab on 03/08/2024 if liver enzymes trending down, but patient presented with elevated " LFTs      #Cerebellar mass likely from metastatic in etiology  -MRI brain showed Hemorrhagic lesions in the cerebellar vermis and right parietal lobe adjacent to the splenium of the corpus callosum along with a few small enhancing cerebellar lesions are nonspecific. Consider hemorrhagic metastases as well as hemorrhages due to slow flow vascular malformations.  - Patient saw NSG 3/5 - advised to wean dexamethasone from 6 mg 4 times daily, to 6 mg twice daily.  Dr. Contreras may further reduce this dosage and then coordinate possible radiosurgery if he deems her to be an appropriate candidate.   - Patient reports taking Decadron 4mg BID  -No further lightheadedness, weakness, dizziness, or other sx   Plan:   - Continue Decadron 4mg BID   - Holding any AC for now iso possible hemorrhagic mets on MRI   -PT/OT eval, follows with outpatient PT/OT      #Hypothyroidism   - Continue levothyroxine 100 mcg orally daily      #Hypertension  -amlodipine 5 mg orally daily  -25 mg orally daily  -HCTZ for now  -Ctm Bps      #Anxiety  -Continue Paxil and Atarax     Fluids: Replete PRN  Electrolytes: Keep mg >2, phos >3  and K >4  Nutrition:  Adult diet Regular   Antimicrobials:   DVT PPX: SCD's in the setting of hemorrhagic mets to brain   Bowel Care: Miralax  Lines: PIV     Code Status: DNR/DNI - OK for ICU (confirmed on admission)  NOK:  Primary Emergency Contact: ElyDaniel, Home Phone: [phone]        Patient and plan discussed with attending physician Dr. Cannon.       SIGNATURE: Carter Robins MD PATIENT NAME: Chio Turner   DATE: March 10, 2024 MRN: [id number]

## 2024-03-11 LAB
ALBUMIN SERPL BCP-MCNC: 3 G/DL (ref 3.4–5)
ALP SERPL-CCNC: 467 U/L (ref 33–136)
ALT SERPL W P-5'-P-CCNC: 390 U/L (ref 7–45)
ANION GAP SERPL CALC-SCNC: 13 MMOL/L (ref 10–20)
AST SERPL W P-5'-P-CCNC: 240 U/L (ref 9–39)
BASOPHILS # BLD MANUAL: 0 X10*3/UL (ref 0–0.1)
BASOPHILS NFR BLD MANUAL: 0 %
BILIRUB SERPL-MCNC: 4.7 MG/DL (ref 0–1.2)
BUN SERPL-MCNC: 23 MG/DL (ref 6–23)
CALCIUM SERPL-MCNC: 8.6 MG/DL (ref 8.6–10.6)
CHLORIDE SERPL-SCNC: 96 MMOL/L (ref 98–107)
CO2 SERPL-SCNC: 28 MMOL/L (ref 21–32)
CREAT SERPL-MCNC: 0.42 MG/DL (ref 0.5–1.05)
DACRYOCYTES BLD QL SMEAR: ABNORMAL
EGFRCR SERPLBLD CKD-EPI 2021: >90 ML/MIN/1.73M*2
EOSINOPHIL # BLD MANUAL: 0 X10*3/UL (ref 0–0.7)
EOSINOPHIL NFR BLD MANUAL: 0 %
ERYTHROCYTE [DISTWIDTH] IN BLOOD BY AUTOMATED COUNT: 24.3 % (ref 11.5–14.5)
GLUCOSE SERPL-MCNC: 100 MG/DL (ref 74–99)
HCT VFR BLD AUTO: 29.9 % (ref 36–46)
HGB BLD-MCNC: 10.6 G/DL (ref 12–16)
HYPOCHROMIA BLD QL SMEAR: ABNORMAL
IMM GRANULOCYTES # BLD AUTO: 0.28 X10*3/UL (ref 0–0.7)
IMM GRANULOCYTES NFR BLD AUTO: 1.9 % (ref 0–0.9)
LYMPHOCYTES # BLD MANUAL: 0.25 X10*3/UL (ref 1.2–4.8)
LYMPHOCYTES NFR BLD MANUAL: 1.7 %
MAGNESIUM SERPL-MCNC: 2.03 MG/DL (ref 1.6–2.4)
MCH RBC QN AUTO: 27.5 PG (ref 26–34)
MCHC RBC AUTO-ENTMCNC: 35.5 G/DL (ref 32–36)
MCV RBC AUTO: 78 FL (ref 80–100)
MITOCHONDRIA AB SER QL IF: NEGATIVE
MONOCYTES # BLD MANUAL: 0.52 X10*3/UL (ref 0.1–1)
MONOCYTES NFR BLD MANUAL: 3.5 %
NEUTS SEG # BLD MANUAL: 13.9 X10*3/UL (ref 1.2–7)
NEUTS SEG NFR BLD MANUAL: 93.9 %
NRBC BLD-RTO: 0.9 /100 WBCS (ref 0–0)
OVALOCYTES BLD QL SMEAR: ABNORMAL
PLATELET # BLD AUTO: 131 X10*3/UL (ref 150–450)
POLYCHROMASIA BLD QL SMEAR: ABNORMAL
POTASSIUM SERPL-SCNC: 3 MMOL/L (ref 3.5–5.3)
PROT SERPL-MCNC: 5 G/DL (ref 6.4–8.2)
RBC # BLD AUTO: 3.85 X10*6/UL (ref 4–5.2)
RBC MORPH BLD: ABNORMAL
SCHISTOCYTES BLD QL SMEAR: ABNORMAL
SMOOTH MUSCLE AB SER QL IF: NEGATIVE
SODIUM SERPL-SCNC: 134 MMOL/L (ref 136–145)
TARGETS BLD QL SMEAR: ABNORMAL
TOTAL CELLS COUNTED BLD: 115
VANCOMYCIN SERPL-MCNC: 17.3 UG/ML (ref 5–20)
VARIANT LYMPHS # BLD MANUAL: 0.13 X10*3/UL (ref 0–0.5)
VARIANT LYMPHS NFR BLD: 0.9 %
WBC # BLD AUTO: 14.8 X10*3/UL (ref 4.4–11.3)

## 2024-03-11 PROCEDURE — 99233 SBSQ HOSP IP/OBS HIGH 50: CPT

## 2024-03-11 PROCEDURE — 85027 COMPLETE CBC AUTOMATED: CPT

## 2024-03-11 PROCEDURE — 2500000001 HC RX 250 WO HCPCS SELF ADMINISTERED DRUGS (ALT 637 FOR MEDICARE OP)

## 2024-03-11 PROCEDURE — 80202 ASSAY OF VANCOMYCIN: CPT | Performed by: INTERNAL MEDICINE

## 2024-03-11 PROCEDURE — 85007 BL SMEAR W/DIFF WBC COUNT: CPT

## 2024-03-11 PROCEDURE — 2060000001 HC INTERMEDIATE ICU ROOM DAILY

## 2024-03-11 PROCEDURE — 99222 1ST HOSP IP/OBS MODERATE 55: CPT | Performed by: STUDENT IN AN ORGANIZED HEALTH CARE EDUCATION/TRAINING PROGRAM

## 2024-03-11 PROCEDURE — 97161 PT EVAL LOW COMPLEX 20 MIN: CPT | Mod: GP

## 2024-03-11 PROCEDURE — 2500000002 HC RX 250 W HCPCS SELF ADMINISTERED DRUGS (ALT 637 FOR MEDICARE OP, ALT 636 FOR OP/ED)

## 2024-03-11 PROCEDURE — 83735 ASSAY OF MAGNESIUM: CPT

## 2024-03-11 PROCEDURE — 2500000004 HC RX 250 GENERAL PHARMACY W/ HCPCS (ALT 636 FOR OP/ED): Performed by: INTERNAL MEDICINE

## 2024-03-11 PROCEDURE — 2500000004 HC RX 250 GENERAL PHARMACY W/ HCPCS (ALT 636 FOR OP/ED)

## 2024-03-11 PROCEDURE — 80053 COMPREHEN METABOLIC PANEL: CPT

## 2024-03-11 PROCEDURE — 0JPV0WZ REMOVAL OF TOTALLY IMPLANTABLE VASCULAR ACCESS DEVICE FROM UPPER EXTREMITY SUBCUTANEOUS TISSUE AND FASCIA, OPEN APPROACH: ICD-10-PCS | Performed by: RADIOLOGY

## 2024-03-11 RX ORDER — POTASSIUM CHLORIDE 1.5 G/1.58G
40 POWDER, FOR SOLUTION ORAL ONCE
Status: COMPLETED | OUTPATIENT
Start: 2024-03-11 | End: 2024-03-11

## 2024-03-11 RX ORDER — PANTOPRAZOLE SODIUM 40 MG/1
40 TABLET, DELAYED RELEASE ORAL
Status: DISCONTINUED | OUTPATIENT
Start: 2024-03-12 | End: 2024-03-13 | Stop reason: HOSPADM

## 2024-03-11 RX ADMIN — CHLORTHALIDONE 25 MG: 25 TABLET ORAL at 09:22

## 2024-03-11 RX ADMIN — DEXAMETHASONE 4 MG: 4 TABLET ORAL at 06:36

## 2024-03-11 RX ADMIN — VANCOMYCIN HYDROCHLORIDE 1250 MG: 1.25 INJECTION, POWDER, LYOPHILIZED, FOR SOLUTION INTRAVENOUS at 00:00

## 2024-03-11 RX ADMIN — AMPICILLIN SODIUM 2 G: 2 INJECTION, POWDER, FOR SOLUTION INTRAMUSCULAR; INTRAVENOUS at 09:48

## 2024-03-11 RX ADMIN — VANCOMYCIN HYDROCHLORIDE 1250 MG: 1.25 INJECTION, POWDER, LYOPHILIZED, FOR SOLUTION INTRAVENOUS at 12:00

## 2024-03-11 RX ADMIN — DEXAMETHASONE 4 MG: 4 TABLET ORAL at 18:12

## 2024-03-11 RX ADMIN — POTASSIUM CHLORIDE 40 MEQ: 1.5 POWDER, FOR SOLUTION ORAL at 18:12

## 2024-03-11 RX ADMIN — AMPICILLIN SODIUM 2 G: 2 INJECTION, POWDER, FOR SOLUTION INTRAMUSCULAR; INTRAVENOUS at 19:30

## 2024-03-11 RX ADMIN — AMPICILLIN SODIUM 2 G: 2 INJECTION, POWDER, FOR SOLUTION INTRAMUSCULAR; INTRAVENOUS at 02:21

## 2024-03-11 RX ADMIN — POTASSIUM CHLORIDE 40 MEQ: 1500 TABLET, EXTENDED RELEASE ORAL at 09:22

## 2024-03-11 RX ADMIN — AMLODIPINE BESYLATE 5 MG: 5 TABLET ORAL at 09:21

## 2024-03-11 RX ADMIN — LEVOTHYROXINE SODIUM 100 MCG: 100 TABLET ORAL at 06:36

## 2024-03-11 RX ADMIN — AMPICILLIN SODIUM 2 G: 2 INJECTION, POWDER, FOR SOLUTION INTRAMUSCULAR; INTRAVENOUS at 06:36

## 2024-03-11 RX ADMIN — AMPICILLIN SODIUM 2 G: 2 INJECTION, POWDER, FOR SOLUTION INTRAMUSCULAR; INTRAVENOUS at 13:28

## 2024-03-11 RX ADMIN — PAROXETINE HYDROCHLORIDE 10 MG: 10 TABLET, FILM COATED ORAL at 09:22

## 2024-03-11 ASSESSMENT — COGNITIVE AND FUNCTIONAL STATUS - GENERAL
STANDING UP FROM CHAIR USING ARMS: A LITTLE
WALKING IN HOSPITAL ROOM: A LITTLE
WALKING IN HOSPITAL ROOM: A LITTLE
MOVING TO AND FROM BED TO CHAIR: A LITTLE
CLIMB 3 TO 5 STEPS WITH RAILING: A LITTLE
STANDING UP FROM CHAIR USING ARMS: A LITTLE
MOBILITY SCORE: 21
DAILY ACTIVITIY SCORE: 24
CLIMB 3 TO 5 STEPS WITH RAILING: A LITTLE
MOBILITY SCORE: 20

## 2024-03-11 ASSESSMENT — PAIN SCALES - GENERAL
PAINLEVEL_OUTOF10: 0 - NO PAIN

## 2024-03-11 ASSESSMENT — PAIN - FUNCTIONAL ASSESSMENT
PAIN_FUNCTIONAL_ASSESSMENT: 0-10

## 2024-03-11 NOTE — PROGRESS NOTES
Chio Turner is a 63 y.o. female on day 3 of admission presenting with Cancer (CMS/HCC).    SW received referral from care team for hospice.  SW met with pt and her spouse at bedside.  Pt is alert and oriented x3.  She was living with her spouse prior to admission.  Pt has breast cancer with mets to the brain and liver.  SW discussed hospice care, philosophy, and services.  Pt wishes to have hospice services at home.  SW provided choice of hospice agencies.  [facility] is desired.  Pt and spouse wish to have [facility] meet with pt at home after discharge from the hospital.  Spouse asked if pt could be discharged sooner rather than later.  SW messaged the care team of family request.  Referral made [facility]  Support provided.    DEONTE Wolfe

## 2024-03-11 NOTE — PROGRESS NOTES
[facility]  [facility]  CONSULT FOLLOW-UP     Reason For Consult: elevated transaminases    History of Present Illness  Chio Turner is a 63 y.o. female with PMH of stage IV right-sided breast ductal carcinoma (ER+ (90%), MO + (70%), HER-2-) diagnosed in June 2015 following with Dr. Kahn with metastatic disease to the lungs and liver and now brain admitted on 3/8/24 from infusion clinic with elevated LFTs (planned to have first dose Sacituzumab on 03/08/2024 but held due to LFT elevation).    SUBJECTIVE     No acute events overnight    EXAM     Last Recorded Vitals  Blood pressure 127/81, pulse 55, temperature 36.8 °C (98.2 °F), temperature source Temporal, resp. rate 18, SpO2 96 %.      Intake/Output Summary (Last 24 hours) at 3/11/2024 0843  Last data filed at 3/11/2024 0251  Gross per 24 hour   Intake 800 ml   Output --   Net 800 ml          Physical Exam  General: well-nourished, no acute distress  HEENT: EOMI, no scleral icterus, moist MM  Respiratory: nonlabored breathing on room air  Cardiovascular: RRR, no murmurs/rubs/gallops  Abdomen: Soft, nontender, nondistended, bowel sounds present, no masses palpated, no organomeagly  Extremities: no edema, no asterixis  Neuro: alert and oriented, CNII-XII grossly intact, moves all 4 extremities with no focal deficits      OBJECTIVE                                                                              Medications             Current Facility-Administered Medications:     amLODIPine (Norvasc) tablet 5 mg, 5 mg, oral, Daily, Yovana Stewart MD, 5 mg at 03/10/24 0848    ampicillin (Omnipen) in sodium chloride 0.9 % 100 mL IV 2 g, 2 g, intravenous, q4h, Carter Robins MD, Stopped at 03/11/24 0706    chlorthalidone (Hygroton) tablet 25 mg, 25 mg, oral, Daily, Yovana Stewart MD, 25 mg at 03/10/24 0848    dexAMETHasone (Decadron) tablet 4 mg, 4 mg, oral, BID, Yovana Stewart MD, 4 mg at 03/11/24 0636     levothyroxine (Synthroid, Levoxyl) tablet 100 mcg, 100 mcg, oral, Daily before breakfast, Yovana Stewart MD, 100 mcg at 03/11/24 0636    loperamide (Imodium) capsule 4 mg, 4 mg, oral, TID PRN, Yovana Stewart MD    ondansetron (Zofran) tablet 8 mg, 8 mg, oral, q8h PRN, Yovana Stewart MD    PARoxetine (Paxil) tablet 10 mg, 10 mg, oral, Daily, Yovana Stewart MD, 10 mg at 03/10/24 0848    potassium chloride CR (Klor-Con M20) ER tablet 40 mEq, 40 mEq, oral, Daily, Yovana Stewart MD, 40 mEq at 03/10/24 0848    prochlorperazine (Compazine) tablet 10 mg, 10 mg, oral, q6h PRN, Yovana Stewart MD    vancomycin (Vancocin) in dextrose 5 % water (D5W) 250 mL IV 1,250 mg, 1,250 mg, intravenous, q12h, Cruzito Cannon MD, Stopped at 03/11/24 0115    vancomycin (Vancocin) placeholder, , miscellaneous, Daily PRN, Carter Robins MD                                                                            Labs     Results for orders placed or performed during the hospital encounter of 03/08/24 (from the past 24 hour(s))   Blood Culture    Specimen: Arterial Line; Blood culture   Result Value Ref Range    Blood Culture Loaded on Instrument - Culture in progress    Blood Culture    Specimen: Mediport; Blood culture   Result Value Ref Range    Gram Stain Gram positive cocci, pairs and chains (AA)     Gram Stain Gram positive cocci, pairs and chains (AA)    CBC and Auto Differential   Result Value Ref Range    WBC 14.8 (H) 4.4 - 11.3 x10*3/uL    nRBC 0.9 (H) 0.0 - 0.0 /100 WBCs    RBC 3.85 (L) 4.00 - 5.20 x10*6/uL    Hemoglobin 10.6 (L) 12.0 - 16.0 g/dL    Hematocrit 29.9 (L) 36.0 - 46.0 %    MCV 78 (L) 80 - 100 fL    MCH 27.5 26.0 - 34.0 pg    MCHC 35.5 32.0 - 36.0 g/dL    RDW 24.3 (H) 11.5 - 14.5 %    Platelets 131 (L) 150 - 450 x10*3/uL    Neutrophils %      Immature Granulocytes %, Automated      Lymphocytes %      Monocytes %      Eosinophils %      Basophils %      Neutrophils Absolute      Lymphocytes Absolute       Monocytes Absolute      Eosinophils Absolute      Basophils Absolute     Comprehensive metabolic panel   Result Value Ref Range    Glucose 100 (H) 74 - 99 mg/dL    Sodium 134 (L) 136 - 145 mmol/L    Potassium 3.0 (L) 3.5 - 5.3 mmol/L    Chloride 96 (L) 98 - 107 mmol/L    Bicarbonate 28 21 - 32 mmol/L    Anion Gap 13 10 - 20 mmol/L    Urea Nitrogen 23 6 - 23 mg/dL    Creatinine 0.42 (L) 0.50 - 1.05 mg/dL    eGFR >90 >60 mL/min/1.73m*2    Calcium 8.6 8.6 - 10.6 mg/dL    Albumin 3.0 (L) 3.4 - 5.0 g/dL    Alkaline Phosphatase 467 (H) 33 - 136 U/L    Total Protein 5.0 (L) 6.4 - 8.2 g/dL     (H) 9 - 39 U/L    Bilirubin, Total 4.7 (H) 0.0 - 1.2 mg/dL     (H) 7 - 45 U/L   Magnesium   Result Value Ref Range    Magnesium 2.03 1.60 - 2.40 mg/dL   Vancomycin   Result Value Ref Range    Vancomycin 17.3 5.0 - 20.0 ug/mL       Imaging   RUQ US 3/9/24  IMPRESSION:  1. Hepatomegaly with innumerable metastatic lesions that are at least unchanged, if not slightly progressed, noting intrinsic limitations with regard to intermodality comparison.  2. Unchanged thickened gallbladder wall without distension, pericholecystic fluid or cholelithiasis due to hepatocellular disease.                                                                         GI Procedures     None available for review    ASSESSMENT / PLAN     ASSESSMENT/PLAN:  Chio Turner is a 63 y.o. female with PMH of stage IV right-sided breast ductal carcinoma (ER+ (90%), MA + (70%), HER-2-) diagnosed in June 2015 following with Dr. Kahn with metastatic disease to the lungs and liver and now brain admitted on 3/8/24 from infusion clinic with elevated LFTs (planned to have first dose Sacituzumab on 03/08/2024 but held due to LFT elevation). Hepatology is consulted for the same.      Patient is noted to have abnormal LFTs since 7/2023 and has gradually worsened since: ,  , alk phos 447, T. Bili 4.7 with D bili 2.0, albumin 3.0, INR 1.0.  Platelet  128. Viral hepatitis panel is unremarkable except positive total IgA; surface antigen negative.RUQ US 3/9/24: Hepatomegaly with innumerable metastatic lesions that are at leastunchanged, if not slightly progressed, noting intrinsic limitations with regard to intermodality comparison. Unchanged thickened gallbladder wall. MRCP 2/22/2024 significantly worsening liver metastasis from a few liver lesions being 12/2023 to the lesions that were too numerous to count (likely 100 or more), no biliary abnormalities noted.     This mixed pattern transaminase elevation with direct hyperbilirubinemia is most likely from liver mets and progressively worsening breast cancer disease burden.  Patient is receiving steroids for brain mets, and if there was an autoimmune component to her liver disease, we would expect this to help.    Recommendations:  -No further intervention  -Continue goals of care conversation.    Patient was seen and discussed with Dr. Crews.    Recommendations communicated with the primary team via secure chat.  Thank you for the consultation. Hepatology will sign off.  - During weekday hours of 7am- 5pm, please do not hesitate to contact me on Jogg Chat or page [phone] if there are any further questions   - After hours, on weekends, and on holidays, please page the on-call GI fellow at [phone]. Thank you.       Carolina Hood MD  PGY4, Gastroenterology and Hepatology Fellow  [facility]

## 2024-03-11 NOTE — PROGRESS NOTES
Vancomycin Dosing by Pharmacy- Cessation of Therapy    Consult to pharmacy for vancomycin dosing has been discontinued by the prescriber, pharmacy will sign off at this time.    Please call pharmacy if there are further questions or re-enter a consult if vancomycin is resumed.     Azar Perkins, AramD

## 2024-03-11 NOTE — PROGRESS NOTES
Physical Therapy    Physical Therapy Evaluation    Patient Name: Chio Turner  MRN: [id number]  Today's Date: 3/11/2024   Time Calculation  Start Time: 0927  Stop Time: 0945  Time Calculation (min): 18 min    Assessment/Plan   PT Assessment  PT Assessment Results: Decreased strength, Decreased range of motion, Decreased endurance, Impaired balance, Decreased mobility  Rehab Prognosis: Fair  Barriers to Discharge: none  End of Session Communication: Bedside nurse  Assessment Comment: 63 y.o. F h/o breast CA w mets now p/w increased liver labs. Currently demonstrating impaired strength and balance. Will benefit from continued PT while in house to address deficits and promote activity.  End of Session Patient Position: Up in chair, Alarm off, caregiver present  IP OR SWING BED PT PLAN  Inpatient or Swing Bed: Inpatient  PT Plan  Treatment/Interventions: Bed mobility, Transfer training, Gait training, Balance training, Strengthening, Endurance training, Therapeutic exercise, Therapeutic activity  PT Plan: Skilled PT  PT Frequency: 3 times per week  PT Discharge Recommendations: No further acute PT ( to assist)  Equipment Recommended upon Discharge:  (Pt reports already owning equipment.)  PT Recommended Transfer Status: Assist x1  PT - OK to Discharge: Yes      Subjective   General Visit Information:  General  Reason for Referral: elevated labs  Past Medical History Relevant to Rehab: 63 y.o. female with a past medical history of stage IV right-sided breast ductal carcinoma diagnosed in June 2015 with metastatic disease to the lungs and liver and now brain admitted on 3/8/24 from infusion clinic with elevated LFTs.  Family/Caregiver Present: Yes ( present)  Caregiver Feedback: /pt reports having ambulated in hallway yesterday.  Prior to Session Communication: Bedside nurse  Patient Position Received: Up in chair  Preferred Learning Style: verbal  Home Living:  Home Living  Type of Home: House  Lives  With: Spouse  Home Adaptive Equipment: Walker rolling or standard, Cane (shower chair)  Home Layout: One level  Home Access: No concerns (1 small step)  Prior Level of Function:  Prior Function Per Pt/Caregiver Report  Level of [place]: Independent with ADLs and functional transfers  Receives Help From:  ( can assist as needed.)  Ambulatory Assistance: Independent  Prior Function Comments: Pt reports feeling weaker in knees w progression of illness.  Precautions:  Precautions  Medical Precautions: Fall precautions    Objective   Pain:  Pain Assessment  Pain Assessment: 0-10  Pain Score: 0 - No pain  Cognition:  Cognition  Overall Cognitive Status: Within Functional Limits  Attention: Within Functional Limits  Insight: Within function limits    General Assessments:    Activity Tolerance  Endurance: Decreased tolerance for upright activites    Sensation  Light Touch: No apparent deficits    Strength  Strength Comments: BLE grossly 4/5 throughout  Strength  Strength Comments: BLE grossly 4/5 throughout    Coordination  Movements are Fluid and Coordinated: Yes    Postural Control  Postural Control: Within Functional Limits    Static Sitting Balance  Static Sitting-Level of Assistance: Close supervision    Static Standing Balance  Static Standing-Balance Support: Bilateral upper extremity supported (On a wheeled walker)  Static Standing-Level of Assistance: Minimum assistance  Functional Assessments:    Bed Mobility  Bed Mobility: No (Pt seated in chair pre/post visit.)    Transfers  Transfer: Yes  Transfer 1  Transfer From 1: Sit to, Stand to  Transfer to 1: Stand, Sit  Transfer Device 1: Walker  Transfer Level of Assistance 1: Minimum assistance    Ambulation/Gait Training  Ambulation/Gait Training Performed: Yes  Ambulation/Gait Training 1  Surface 1: Level tile  Device 1: Rolling walker  Assistance 1: Minimum assistance  Quality of Gait 1: Diminished heel strike, Foot slap, Inconsistent stride length,  Decreased step length, Shuffling gait  Comments/Distance (ft) 1: 75ft    Outcome Measures:  [facility] Basic Mobility  Turning from your back to your side while in a flat bed without using bedrails: None  Moving from lying on your back to sitting on the side of a flat bed without using bedrails: None  Moving to and from bed to chair (including a wheelchair): None  Standing up from a chair using your arms (e.g. wheelchair or bedside chair): A little  To walk in hospital room: A little  Climbing 3-5 steps with railing: A little  Basic Mobility - Total Score: 21    Encounter Problems       Encounter Problems (Active)       Mobility       STG - Patient will ambulate >150ft, wheeled walker, independent       Start:  03/11/24    Expected End:  03/25/24               PT Transfers       STG - Patient to transfer to and from sit to supine independent       Start:  03/11/24    Expected End:  03/25/24            STG - Patient will transfer sit to and from stand independent       Start:  03/11/24    Expected End:  03/25/24               Pain - Adult              Education Documentation  Mobility Training, taught by Mario Castellon, PT at 3/11/2024 10:00 AM.  Learner: Patient  Readiness: Acceptance  Method: Explanation  Response: Verbalizes Understanding    Education Comments  No comments found.

## 2024-03-11 NOTE — CONSULTS
Inpatient consult to Infectious Diseases  Consult performed by: Leonardo Camacho MD  Consult ordered by: Cruzito Cannon MD        Referred by Dr. Cannon    Primary MD: Moiz Thurman MD    Reason For Consult    enterococcus bacteremia       History Of Present Illness  Chio Turner is a 63 y.o. female with a past medical history of stage IV right-sided breast ductal carcinoma (ER+ (90%), FL + (70%), HER-2-) on faslodex and sacituzumab diagnosed in June 2015 following with Dr. Kahn with metastatic disease to the lungs and liver and now brain admitted on 3/8/24 from infusion clinic with elevated LFTs. ID consulted for enterococcus bacteremia.    Patient presented on 3/8 with elevated Lfts and hyperbilirubinemia. Patient also noted to have worsening leukocytosis so blood cultures were obtained and showing enterococcus bacteremia. Patient with ultrasound with Hepatomegaly with innumerable metastatic lesions that are at leastunchanged, if not slightly progressed, noting intrinsic limitations with regard to intermodality comparison. Unchanged thickened gallbladder wall. Also with MRCP showing 100 liver lesions. On vancomycin and ampicillin for enterococcus bacteremia. Port is being removed.    Patient feels well. Denies any nausea, emesis diarrhea. No pain around port site.       Past Medical History  She has a past medical history of Other specified health status (06/16/2015).    Surgical History  She has a past surgical history that includes Tonsillectomy (06/16/2015); Hernia repair (06/16/2015); Other surgical history (01/23/2019); Other surgical history (01/24/2019); CT guided percutaneous biopsy lung (7/1/2015); and US guided needle liver biopsy (12/26/2023).     Social History     Occupational History    Not on file   Tobacco Use    Smoking status: Never     Passive exposure: Never    Smokeless tobacco: Never   Substance and Sexual Activity    Alcohol use: Not on file    Drug use: Not on file    Sexual  activity: Not on file     Travel History   Travel since 02/11/24    No documented travel since 02/11/24            Family History  Family History   Problem Relation Name Age of Onset    Ovarian cancer Mother      Colon cancer Father       Allergies  Penicillins     Immunization History   Administered Date(s) Administered    Flu vaccine (IIV4), preservative free *Check age/dose* 10/06/2017, 09/10/2018, 09/29/2019, 09/19/2021    Flu vaccine, quadrivalent, no egg protein, age 6 month or greater (FLUCELVAX) 10/04/2022, 09/20/2023    Influenza, injectable, quadrivalent 10/10/2019    Influenza, seasonal, injectable 10/06/2014, 10/07/2015, 10/17/2016    Novel influenza-H1N1-09, preservative-free 11/15/2009    Pfizer COVID-19 vaccine, bivalent, age 12 years and older (30 mcg/0.3 mL) 09/19/2022    Pfizer Gray Cap SARS-CoV-2 04/04/2022    Pfizer Purple Cap SARS-CoV-2 03/03/2021, 03/24/2021, 09/03/2021     Medications  Home medications:  Medications Prior to Admission   Medication Sig Dispense Refill Last Dose    amLODIPine (Norvasc) 5 mg tablet Take 1 tablet (5 mg) by mouth once daily.   3/7/2024    chlorthalidone (Hygroton) 25 mg tablet Take 1 tablet (25 mg) by mouth once daily.   3/7/2024    dexAMETHasone (Decadron) 4 mg tablet Take 1 tablet (4 mg) by mouth 4 times a day. (Patient taking differently: Take 1 tablet (4 mg) by mouth 2 times a day.) 120 tablet 0 3/8/2024    dexAMETHasone (Decadron) 4 mg tablet Take 2 tablets (8 mg) by mouth once daily. For 3 days starting the day after treatment. (Patient not taking: Reported on 3/8/2024) 12 tablet 5 Not Taking    levothyroxine (Synthroid, Levoxyl) 100 mcg tablet Take 1 tablet (100 mcg) by mouth once daily in the morning. Take before meals.   3/8/2024    loperamide (Imodium) 2 mg capsule Take 2 capsules (4 mg) by mouth with the first episode of diarrhea and 1 capsule (2 mg) by mouth with any additional episodes. Maximum 8 capsules (16 mg) per day. 100 capsule 2 Unknown     OLANZapine (ZyPREXA) 5 mg tablet Take 1 tablet (5 mg) by mouth once daily at bedtime. For 4 days starting the evening of treatment (Patient not taking: Reported on 3/8/2024) 8 tablet 5 Not Taking    ondansetron (Zofran) 8 mg tablet Take 1 tablet (8 mg) by mouth every 8 hours if needed for nausea or vomiting. 30 tablet 5 Unknown    PaxiL 10 mg tablet Take 1 tablet (10 mg) by mouth once daily.   3/7/2024    potassium chloride CR 20 mEq ER tablet Take 2 tablets (40 mEq) by mouth twice a day.   3/7/2024    prochlorperazine (Compazine) 10 mg tablet Take 1 tablet (10 mg) by mouth every 6 hours if needed for nausea or vomiting. 30 tablet 5 Unknown    ribociclib (Kisqali) 3 x 200 mg tablet therapy pack Take 3 tablets (600 mg total) by mouth in the morning for 21 days on, followed by 7 days off per 28-day treatment cycle. Repeat cycle every 28 days. (Patient not taking: Reported on 3/8/2024) 63 each 0 Not Taking     Current medications:  Scheduled medications  amLODIPine, 5 mg, oral, Daily  ampicillin, 2 g, intravenous, q4h  chlorthalidone, 25 mg, oral, Daily  dexAMETHasone, 4 mg, oral, BID  levothyroxine, 100 mcg, oral, Daily before breakfast  [START ON 3/12/2024] pantoprazole, 40 mg, oral, Daily before breakfast  PARoxetine, 10 mg, oral, Daily  potassium chloride CR, 40 mEq, oral, Daily      Continuous medications     PRN medications  PRN medications: loperamide, ondansetron, prochlorperazine    Review of Systems     Review of systems otherwise negative    Objective  Range of Vitals (last 24 hours)  Heart Rate:  [55-77]   Temp:  [36.2 °C (97.2 °F)-36.8 °C (98.2 °F)]   Resp:  [17-18]   BP: (124-141)/(78-87)   SpO2:  [95 %-97 %]   Daily Weight  03/08/24 : 80.3 kg (177 lb)    There is no height or weight on file to calculate BMI.     Physical Exam   General: in no acute distress, able to answer questions  Chest wall: Port site with no redness or pain, no swelling  HEENT: no conjunctival injection. anicteric.  CVS: RRR. Normal  "S1 and S2. No m/r/g.   RESP: ctab no w/r/r, no increased wob  Abd: +BS. Soft and lax. ND.   Ext: No swelling of the LE b/l.   Neuro: Alert and oriented x 4. Answers questions appropriately.  Integumentary: no obvious lesions   Rheumatologic: No joint swelling or edema    Relevant Results  Labs  Results from last 72 hours   Lab Units 03/11/24  0637 03/10/24  0638 03/09/24  0948   WBC AUTO x10*3/uL 14.8* 12.9* 12.2*   HEMOGLOBIN g/dL 10.6* 10.5* 11.5*   HEMATOCRIT % 29.9* 30.5* 33.2*   PLATELETS AUTO x10*3/uL 131* 128* 164   LYMPHO PCT MAN % 1.7 5.2 1.7   MONO PCT MAN % 3.5 5.2 7.0   EOSINO PCT MAN % 0.0 0.0 0.0     Results from last 72 hours   Lab Units 03/11/24  0637 03/10/24  0638 03/09/24  0948   SODIUM mmol/L 134* 134* 134*   POTASSIUM mmol/L 3.0* 3.6 4.3   CHLORIDE mmol/L 96* 98 98   CO2 mmol/L 28 26 26   BUN mg/dL 23 26* 28*   CREATININE mg/dL 0.42* 0.46* 0.54   GLUCOSE mg/dL 100* 138* 185*   CALCIUM mg/dL 8.6 8.9 9.0   ANION GAP mmol/L 13 14 14   EGFR mL/min/1.73m*2 >90 >90 >90     Results from last 72 hours   Lab Units 03/11/24  0637 03/10/24  0638 03/09/24  0948   ALK PHOS U/L 467* 447* 406*   BILIRUBIN TOTAL mg/dL 4.7* 4.7* 4.4*   BILIRUBIN DIRECT mg/dL  --   --  2.0*   PROTEIN TOTAL g/dL 5.0* 5.0* 5.7*   ALT U/L 390* 399* 380*   AST U/L 240* 229* 201*   ALBUMIN g/dL 3.0* 3.0* 3.2*     Estimated Creatinine Clearance: 125 mL/min (A) (by C-G formula based on SCr of 0.42 mg/dL (L)).  No results found for: \"CRP\", \"SEDRATE\"  No results found for: \"HIV1X2\", \"HIVCONF\", \"[id number]\"  Hepatitis C AB   Date Value Ref Range Status   03/09/2024 Nonreactive Nonreactive Final     Comment:     Results from patients taking biotin supplements or receiving high-dose biotin therapy should be interpreted with caution due to possible interference with this test. Providers may contact their local laboratory for further information.     Microbiology  Susceptibility data from last 90 days.  Collected Specimen Info Organism "   03/10/24 Blood culture from Mediport Enterococcus faecalis   03/09/24 Blood culture from Arterial Line Enterococcus faecalis     Imaging      IMPRESSION:  1. Hepatomegaly with innumerable metastatic lesions that are at least  unchanged, if not slightly progressed, noting intrinsic limitations  with regard to intermodality comparison.  2. Unchanged thickened gallbladder wall without distension,  pericholecystic fluid or cholelithiasis due to hepatocellular disease.     Assessment/Plan   Chio Turner is a 63 y.o. female with a past medical history of stage IV right-sided breast ductal carcinoma (ER+ (90%), AL + (70%), HER-2-) on faslodex and sacituzumab diagnosed in June 2015 following with Dr. Kahn with metastatic disease to the lungs and liver and now brain admitted on 3/8/24 from infusion clinic with elevated LFTs. ID consulted for enterococcus bacteremia.    #Enteroccocus faecalis bacteremia  #Elevated LFT  # stage IV right-sided breast ductal carcinoma (ER+ (90%), AL + (70%), HER-2-) on faslodex and sacituzumab, 100s of metastatic lesions in the liver  Patient with enterococcus faecalis bacteremia. Likely source is port of biliary pathology due to malignancy in the liver. Patient with no obvious port infection, but this should be removed given that enterococcus can seed there    -Continue vancomycin and ampicillin  -Please obtain tte  -Repeat blood cultures  -Waiting on susceptibilities    ID will continue to follow. If any questions regarding this patient please call Team pager.  Discussed with Dr. Patito Camacho  Infectious disease, PGY V  Team B pager [phone]  General ID pager [phone]       Leonardo Camacho MD

## 2024-03-11 NOTE — PROGRESS NOTES
Internal Medicine Progress Note   [facility]  March 11, 2024    Patient: Chio Turner    Medical Record: [id number]    Chio Turner is a 63 y.o. female with a past medical history of stage IV right-sided breast ductal carcinoma (ER+ (90%), MS + (70%), HER-2-) diagnosed in June 2015 following with Dr. Kahn with metastatic disease to the lungs and liver and now brain admitted on 3/8/24 from infusion clinic with elevated LFTs.      Subjective   Started on Ampicillin overnight for blood cultures growing Enterococcus faecalis.   Patient feeling well this morning. No acute complaints, would like to talk with Dr. Kahn regarding next steps. Eating well. Had 1BM last night. No other acute concerns.   Medications   Medications:   Scheduled medications  amLODIPine, 5 mg, oral, Daily  ampicillin, 2 g, intravenous, q4h  chlorthalidone, 25 mg, oral, Daily  dexAMETHasone, 4 mg, oral, BID  levothyroxine, 100 mcg, oral, Daily before breakfast  PARoxetine, 10 mg, oral, Daily  potassium chloride CR, 40 mEq, oral, Daily  vancomycin, 1,250 mg, intravenous, q12h      Continuous medications     PRN medications  PRN medications: loperamide, ondansetron, prochlorperazine, vancomycin     Objective   Vitals  Visit Vitals  /81 (BP Location: Left arm, Patient Position: Lying)   Pulse 55   Temp 36.8 °C (98.2 °F) (Temporal)   Resp 18        Intake/Output  I/O last 3 completed shifts:  In: 800 [IV Piggyback:800]  Out: -     Physical Exam  Constitutional:       Appearance: Normal appearance.   Eyes:      Extraocular Movements: Extraocular movements intact.      Conjunctiva/sclera: Conjunctivae normal.   Cardiovascular:      Rate and Rhythm: Normal rate and regular rhythm.      Pulses: Normal pulses.      Heart sounds: Normal heart sounds.   Pulmonary:      Effort: Pulmonary effort is normal.      Breath sounds: Normal breath sounds.   Abdominal:      General: Abdomen is flat. Bowel sounds are normal.       Palpations: Abdomen is soft.   Neurological:      General: No focal deficit present.      Mental Status: She is alert and oriented to person, place, and time.   Psychiatric:         Mood and Affect: Mood normal.         Thought Content: Thought content normal.        Labs  Results from last 7 days   Lab Units 03/10/24  0638 03/09/24  0948 03/08/24  0851   WBC AUTO x10*3/uL 12.9* 12.2* 16.7*   HEMOGLOBIN g/dL 10.5* 11.5* 12.5   HEMATOCRIT % 30.5* 33.2* 36.3   PLATELETS AUTO x10*3/uL 128* 164 211       Results from last 7 days   Lab Units 03/10/24  0638 03/09/24  0948 03/08/24  0851   SODIUM mmol/L 134* 134* 134*   POTASSIUM mmol/L 3.6 4.3 4.2   CO2 mmol/L 26 26 27   ANION GAP mmol/L 14 14 14   BUN mg/dL 26* 28* 30*   CREATININE mg/dL 0.46* 0.54 0.60   GLUCOSE mg/dL 138* 185* 236*   EGFR mL/min/1.73m*2 >90 >90 >90   MAGNESIUM mg/dL 2.07 2.19  --         Results from last 7 days   Lab Units 03/10/24  0638 03/09/24  0948 03/08/24  0851   ALT U/L 399* 380* 435*   AST U/L 229* 201* 236*   ALK PHOS U/L 447* 406* 431*            Imaging  CT ANGIO HEAD AND NECK W AND WO IV CONTRAST 2/21/24:  1. 2 hyperdense cerebellar masses, the largest measures 10 x 14 mm,  most consistent with metastatic disease. The larger lesion is located  in the cerebellar vermis and associated with mild edema. No  significant mass effect. Questionable small mass involving the right  lentiform nucleus. MRI brain with contrast is more sensitive  evaluation for metastatic disease and may be considered.     2. No significant stenosis of the cervical carotid or vertebral  arteries.     3. No intracranial major branch occlusion or hemodynamically  significant stenosis.     4. Partially imaged pulmonary/intrathoracic metastatic disease.     MR BRAIN 2/23/24:  Hemorrhagic lesions in the cerebellar vermis and right parietal lobe  adjacent to the splenium of the corpus callosum along with a few  small enhancing cerebellar lesions are nonspecific.  Consider  hemorrhagic metastases as well as hemorrhages due to slow flow  vascular malformations.     MRCP 2/22/24:  On the most recent comparison exam of the abdomen and pelvis, CT with  contrast 15 December 2023, there were just a few liver lesions  suspect for metastases; today, too numerous to count new variably  sized liver metastases, likely 100 or more.       No other contributory abnormalities      No biliary duct dilation      No choledocholithiasis      No pancreatic duct dilation      Edematous gallbladder wall thickening, probably due to hepatic  dysfunction. No gallstones or gallbladder dilation      Away from the liver, no other metastatic disease in the abdomen      No stigmata of carcinomatosis such as free fluid or omental or  peritoneal implants      Known right adrenal myelolipoma    RUQ US 3/9/24:  1. Hepatomegaly with innumerable metastatic lesions that are at least  unchanged, if not slightly progressed, noting intrinsic limitations  with regard to intermodality comparison.  2. Unchanged thickened gallbladder wall without distension,  pericholecystic fluid or cholelithiasis due to hepatocellular disease.         Assessment/Plan   Chio Turner is a 63 y.o. female with a past medical history of stage IV right-sided breast ductal carcinoma (ER+ (90%), CT + (70%), HER-2-) diagnosed in June 2015 with metastatic disease to the lungs and liver and now brain admitted on 3/8/24 from infusion clinic with elevated LFTs. Likely that elevated transaminases are in the setting of worsening metastatic disease vs biliary obstruction vs DILI vs viral etiology. R-factor score 3.0 showing mixed hepatocellular and cholestatic injury. Patient currently asymptomatic and reports being in her usual state of health, no abdominal pain, n/v, f/c. Labs, RUQ US pending.      Updates 3/11:   - Patient spoke with Dr. Kahn today, would like to go home with hospice   - Plan for mediport removal   - Started on Ampicillin  "overnight for blood cultures growing Enterococcus faecalis      #Elevated LFTs in the setting of metastatic disease to liver   #Possible visceral crisis   #Hyperbilirubinemia   -R-factor score 3.0; showing mixed hepatocellular and cholestatic injury   -MRCP at OSH 2/27/24 showed \"too numerous to count new variably sized liver metastases, likely 100 or more \"  -Was taking Tumeric supplement, other vitamin supplements at home but discontinued this during the week; no other new medications reported  - Etiologies include worsening metastatic disease with visceral crisis vs biliary obstruction vs DILI vs viral etiology  - RUQ showing similar findings to MRCP 2/22/24 with hepatomegaly and innumerable metastatic lesions   - Patient continues to remain asymptomatic  - Hepatology consulted for visceral crisis vs possible obstruction, we appreciate the recommendations   - Hepatitis serologies neg  - Direct bilirubin 1.7, coags wnl   Plan:    - Anti-sm muscle antibody, ceruloplasmin, AFP, anti-mitochondrial antibody pending   - Blood cultures growing E. faecium; continue Ampicillin 3/11  - Will plan for mediport removal iso bacteremia  - Patient spoke with Dr. Kahn today, would like to go home with hospice   - Per hepatology, recommend supportive care as etiology      #Enterococcus faecalis bacteremia   #Leukocytosis iso steroid usage, improving   - Likely steroid induced, pt has been on steroids since admission at [facility] with findings of metastatic disease to the brain   - Enterococcus faecalis bacteremia 3/10 cultures; started on Vancomycin and Cefazolin 3/10, switched to Ampicillin 3/11    #Stage IV right-sided breast ductal carcinoma (ER+, WA+, HER2-) diagnosed in June 2015 with metastatic disease to the lungs, liver, brain  - Follows with Dr. Kahn, last seen on 3/5/24   - Planned to have first dose Sacituzumab on 03/08/2024 if liver enzymes trending down, but patient presented with elevated LFTs      #Cerebellar mass " likely from metastatic in etiology  -MRI brain showed Hemorrhagic lesions in the cerebellar vermis and right parietal lobe adjacent to the splenium of the corpus callosum along with a few small enhancing cerebellar lesions are nonspecific. Consider hemorrhagic metastases as well as hemorrhages due to slow flow vascular malformations.  - Patient saw NSG 3/5 - advised to wean dexamethasone from 6 mg 4 times daily, to 6 mg twice daily.  Dr. Contreras may further reduce this dosage and then coordinate possible radiosurgery if he deems her to be an appropriate candidate.   - Patient reports taking Decadron 4mg BID  -No further lightheadedness, weakness, dizziness, or other sx   Plan:   - Continue Decadron 4mg BID   - Holding any AC for now iso possible hemorrhagic mets on MRI   -PT/OT eval, follows with outpatient PT/OT      #Hypothyroidism   - Continue levothyroxine 100 mcg orally daily      #Hypertension  -amlodipine 5 mg orally daily  -25 mg orally daily  -HCTZ for now  -Ctm Bps      #Anxiety  -Continue Paxil and Atarax     Fluids: Replete PRN  Electrolytes: Keep mg >2, phos >3  and K >4  Nutrition:  Adult diet Regular   Antimicrobials:   DVT PPX: SCD's in the setting of hemorrhagic mets to brain   Bowel Care: Miralax  Lines: PIV     Code Status: DNR/DNI - OK for ICU (confirmed on admission)  NOK:  Primary Emergency Contact: Daniel Turner, Home Phone: [phone]        Patient and plan discussed with attending physician Dr. Bryan.       SIGNATURE: Yovana Stewart MD PATIENT NAME: Chio Turner   DATE: March 11, 2024 MRN: [id number]

## 2024-03-11 NOTE — PROGRESS NOTES
"Vancomycin Dosing by Pharmacy- FOLLOW UP    Chio Turner is a 63 y.o. year old female who Pharmacy has been consulted for vancomycin dosing for line infections. Based on the patient's indication and renal status this patient is being dosed based on a goal AUC of 400-600.     Renal function is currently stable.    Current vancomycin dose: 1250 mg given every 12 hours    Estimated vancomycin AUC on current dose: 574 mg/L.hr     Visit Vitals  /84 (BP Location: Left arm)   Pulse 77   Temp 36.2 °C (97.2 °F) (Temporal)   Resp 18        Lab Results   Component Value Date    CREATININE 0.42 (L) 03/11/2024    CREATININE 0.46 (L) 03/10/2024    CREATININE 0.54 03/09/2024    CREATININE 0.60 03/08/2024        Patient weight is No results found for: \"PTWEIGHT\"    No results found for: \"CULTURE\"     I/O last 3 completed shifts:  In: 800 [IV Piggyback:800]  Out: -   [unfilled]    No results found for: \"PATIENTTEMP\"     Assessment/Plan    Within goal AUC range. Continue current vancomycin regimen.    This dosing regimen is predicted by [employer] to result in the following pharmacokinetic parameters:  AUC24,ss: 574 mg/L.hr  Probability of AUC24 > 400: 98 %  Ctrough,ss: 16.6 mg/L  Probability of Ctrough,ss > 20: 25 %    The next level will be obtained on 3/12 with mid am labs. May be obtained sooner if clinically indicated.   Will continue to monitor renal function daily while on vancomycin and order serum creatinine at least every 48 hours if not already ordered.  Follow for continued vancomycin needs, clinical response, and signs/symptoms of toxicity.       Richa Campbell, PharmD           "

## 2024-03-11 NOTE — CONSULTS
Department of Gastroenterology and Hepatology   Hepatology Inpatient Consult     HPI  Chio Turner is a 63 y.o. female with PMH of stage IV right-sided breast ductal carcinoma (ER+ (90%), NV + (70%), HER-2-) diagnosed in June 2015 following with Dr. Kahn with metastatic disease to the lungs and liver and now brain admitted on 3/8/24 from infusion clinic with elevated LFTs (planned to have first dose Sacituzumab on 03/08/2024 but held due to LFT elevation)    Hepatology is consulted for the same.     Patient is noted to have abnormal LFTs since 7/2023 and has gradually worsened since: AST 33 in 7/2023--> 47 in 10/2023-> 229 today, ALT 30 in 10/2023-> 399 today (peak at 436 on 3/8/24), alk phos 80 10/2023-> 260 in 2/2024--> 447 today, T. Bili 1.3 in 7/2023--> 4.7 today with D bili 2.0, albumin 3.0, INR 1.0.  Platelet 128. Viral hepatitis panel is unremarkable except positive total IgA; surface antigen negative.    RUQ US 3/9/24: Hepatomegaly with innumerable metastatic lesions that are at leastunchanged, if not slightly progressed, noting intrinsic limitations with regard to intermodality comparison. Unchanged thickened gallbladder wall.  MRCP 2/22/2024 significantly worsening liver metastasis from a few liver lesions being 12/2023 to the lesions that were too numerous to count (likely 100 or more), no biliary abnormalities noted.       Risk factors for liver disease:  ETOH use: Denies  IVD use: Denies  Other hepatotoxic medication intake: Chemo/immunotherapy (see below oncology treatment history)  Herbal intake: Denies      Onc History:   2015-7-1: Cancer Staging: Lung biopsy: Metastatic mammary carcinoma with neuroendocrine features, ER 99%, NV 85%, and her2 negative.  2015-7-8: New Treatment Plan: Initiated on Letrozole 2.5mg by mouth daily plus pablociclib 125 mg by mouth daily 3 weeks on, one week off.  2018-8-10: New Treatment Plan: Letrozole discontinued. Patient started on Faslodex plus Ibrance due to slight  disease progression in right breast  2019-1-16: Cancer Related Surgery: S/p bilateral mastectomy with right ALND. Pathology revealed 3.0cm of invasive ductal carcinoma, with 1/2 SLNs positive and 1/5 ALNs positive.  2019-4-19: New Treatment Plan: Faslodex discontinued. Letrozole restarted. Will continue on Ibrance  2020-9-11: New Treatment Plan: Faslodex resumed due to progression in lung. Letrozole discontinued. Will remain on Ibrance  2022-7-2: New Treatment Plan: Started on Afinitor and Exemestane. Ibrance and Faslodex discontinued due to disease progression  2022-9-16: Disease Assessment: Interval increase in thoracic tumor burden  2022-10-7: New Treatment Plan: Started on Capecitabine 2000mg by mouth BID  2023-2-10: New treatment started with FAM-Trastuzumab Deruxtecan due to disease progression  2023-15-12: Patient underwent restaging scans. This showed disease progression in lung and liver with redemonstration of several bilateral pulmonary nodules/masses stable to increased in size since 09/01/2023, concerning for progressive metastatic disease and interval development of several hypoattenuating, some subtle, hepatic lesions as detailed above, concerning for metastatic disease.  12/26/2023: Patient underwent liver biopsy. This revealed metastatic mammry carcinoma with neuroendocrine features, ER+ 95%, IN positive 85% and Her2 negative 1+  1/12/2024: Received the last dose of Enhertu. Discontinued due to disease progression  02/01/2024: Switched to Ribociclib and Faslodex. This regimen selected because foundation one confirms CCND1 amplification and patient was stable on Ibrance for 5 years from 2805-3859 and has thus far been the most effective regimen for patient.  02/24/2024: Patient completed MRI of the brain which showed hemorrhagic lesions in the cerebellar vermis and right parietal lobe adjacent to the splenium of the corpus callosum along with a few small enhancing cerebellar lesions are nonspecific.    02/24/2024: MRCP showed innumerable liver lesions    ROS  Positives as noted in HPI. All other systems were reviewed and negative.     Past Medical History:   Diagnosis Date    Other specified health status 06/16/2015    No pertinent past medical history       Past Surgical History:   Procedure Laterality Date    CT GUIDED PERCUTANEOUS BIOPSY LUNG  7/1/2015    CT GUIDED PERCUTANEOUS BIOPSY LUNG 7/1/2015 [facility] [place] [facility]    HERNIA REPAIR  06/16/2015    Hernia Repair    OTHER SURGICAL HISTORY  01/23/2019    Breast reconstruction    OTHER SURGICAL HISTORY  01/24/2019    Mastectomy bilateral    TONSILLECTOMY  06/16/2015    Tonsillectomy    US GUIDED NEEDLE LIVER BIOPSY  12/26/2023    US GUIDED NEEDLE LIVER BIOPSY 12/26/2023 Torei Rose MD [facility]       Family History   Problem Relation Name Age of Onset    Ovarian cancer Mother      Colon cancer Father         Social History     Socioeconomic History    Marital status:      Spouse name: Not on file    Number of children: Not on file    Years of education: Not on file    Highest education level: Not on file   Occupational History    Not on file   Tobacco Use    Smoking status: Never     Passive exposure: Never    Smokeless tobacco: Never   Substance and Sexual Activity    Alcohol use: Not on file    Drug use: Not on file    Sexual activity: Not on file   Other Topics Concern    Not on file   Social History Narrative    Not on file     Social Determinants of Health     Financial Resource Strain: Low Risk  (3/8/2024)    Overall Financial Resource Strain (CARDIA)     Difficulty of Paying Living Expenses: Not hard at all   Food Insecurity: Not on file   Transportation Needs: No Transportation Needs (3/8/2024)    PRAPARE - Transportation     Lack of Transportation (Medical): No     Lack of Transportation (Non-Medical): No   Physical Activity: Not on file   Stress: Not on file   Social Connections: Not on file   Intimate Partner Violence: Not on file   Housing  Stability: Low Risk  (3/8/2024)    Housing Stability Vital Sign     Unable to Pay for Housing in the Last Year: No     Number of Places Lived in the Last Year: 1     Unstable Housing in the Last Year: No       Current Facility-Administered Medications   Medication Dose Route Frequency Provider Last Rate Last Admin    amLODIPine (Norvasc) tablet 5 mg  5 mg oral Daily Yovana Stewart MD   5 mg at 03/10/24 0848    ceFAZolin in dextrose (iso-os) (Ancef) IVPB 2 g  2 g intravenous q8h Carter Robins MD 0 mL/hr at 03/10/24 1320 2 g at 03/10/24 1959    chlorthalidone (Hygroton) tablet 25 mg  25 mg oral Daily Yovana Stewart MD   25 mg at 03/10/24 0848    dexAMETHasone (Decadron) tablet 4 mg  4 mg oral BID Yovana Stewart MD   4 mg at 03/10/24 1731    levothyroxine (Synthroid, Levoxyl) tablet 100 mcg  100 mcg oral Daily before breakfast Yovana Stewart MD   100 mcg at 03/10/24 0638    loperamide (Imodium) capsule 4 mg  4 mg oral TID PRN Yovana Stewart MD        ondansetron (Zofran) tablet 8 mg  8 mg oral q8h PRN Yovana Stewart MD        PARoxetine (Paxil) tablet 10 mg  10 mg oral Daily Yovana Stewart MD   10 mg at 03/10/24 0848    potassium chloride CR (Klor-Con M20) ER tablet 40 mEq  40 mEq oral Daily Yovana Stewart MD   40 mEq at 03/10/24 0848    prochlorperazine (Compazine) tablet 10 mg  10 mg oral q6h PRN oYvana Stewart MD        vancomycin (Vancocin) in dextrose 5 % water (D5W) 250 mL IV 1,250 mg  1,250 mg intravenous q12h Cruzito Cannon MD   Stopped at 03/10/24 1315    vancomycin (Vancocin) placeholder   miscellaneous Daily PRN Carter Robins MD            Allergies   Allergen Reactions    Penicillins Hives and Unknown        Physical Exam  /87 (BP Location: Left arm, Patient Position: Sitting)   Pulse 76   Temp 36.2 °C (97.2 °F) (Temporal)   Resp 18   SpO2 96%     PE:  GEN: Sitting in bed comfortably, NAD   NEURO: A&O x3, no asterixis   HEENT: No scleral icterus   CV: Distant heard sounds, RRR, no  obvious m/r/g   PULM: LCTA, no wheezing/rhonchi/crackles   ABD: Soft, NT, ND, +BS   : No suprapubic/CVA tenderness   EXT: No edema in LES   SKIN: Mild jaundice      Labs  Lab Results   Component Value Date    WBC 12.9 (H) 03/10/2024    HGB 10.5 (L) 03/10/2024    HCT 30.5 (L) 03/10/2024    MCV 80 03/10/2024     (L) 03/10/2024      Lab Results   Component Value Date    GLUCOSE 138 (H) 03/10/2024    CALCIUM 8.9 03/10/2024     (L) 03/10/2024    K 3.6 03/10/2024    CO2 26 03/10/2024    CL 98 03/10/2024    BUN 26 (H) 03/10/2024    CREATININE 0.46 (L) 03/10/2024     Lab Results   Component Value Date     (H) 03/10/2024     (H) 03/10/2024    ALKPHOS 447 (H) 03/10/2024    BILITOT 4.7 (H) 03/10/2024      [unfilled]      ASSESSMENT AND PLAN  Chio Turner is a 63 y.o. female with PMH of stage IV right-sided breast ductal carcinoma (ER+ (90%), CT + (70%), HER-2-) diagnosed in June 2015 following with Dr. Kahn with metastatic disease to the lungs and liver and now brain admitted on 3/8/24 from infusion clinic with elevated LFTs (planned to have first dose Sacituzumab on 03/08/2024 but held due to LFT elevation). Hepatology is consulted for the same.     Patient is noted to have abnormal LFTs since 7/2023 and has gradually worsened since: ,  , alk phos 447, T. Bili 4.7 with D bili 2.0, albumin 3.0, INR 1.0.  Platelet 128. Viral hepatitis panel is unremarkable except positive total IgA; surface antigen negative.RUQ US 3/9/24: Hepatomegaly with innumerable metastatic lesions that are at leastunchanged, if not slightly progressed, noting intrinsic limitations with regard to intermodality comparison. Unchanged thickened gallbladder wall. MRCP 2/22/2024 significantly worsening liver metastasis from a few liver lesions being 12/2023 to the lesions that were too numerous to count (likely 100 or more), no biliary abnormalities noted.       # Mixed pattern LFT elevation with direct bilirubinemia:  Most likely from innumerable liver metastasis from breast cancer, less likely 2/2 anti-tumor meds vs autoimmune etiology   -Monitor LFTs  -Supportive care per primary team    Hepatology will follow

## 2024-03-12 ENCOUNTER — APPOINTMENT (OUTPATIENT)
Dept: CARDIOLOGY | Facility: HOSPITAL | Age: 64
DRG: 436 | End: 2024-03-12
Payer: MEDICARE

## 2024-03-12 ENCOUNTER — APPOINTMENT (OUTPATIENT)
Dept: RADIOLOGY | Facility: HOSPITAL | Age: 64
DRG: 436 | End: 2024-03-12
Payer: MEDICARE

## 2024-03-12 ENCOUNTER — APPOINTMENT (OUTPATIENT)
Dept: RADIATION ONCOLOGY | Facility: HOSPITAL | Age: 64
End: 2024-03-12
Payer: MEDICARE

## 2024-03-12 ENCOUNTER — PHARMACY VISIT (OUTPATIENT)
Dept: PHARMACY | Facility: CLINIC | Age: 64
End: 2024-03-12
Payer: COMMERCIAL

## 2024-03-12 LAB
ALBUMIN SERPL BCP-MCNC: 2.7 G/DL (ref 3.4–5)
ALP SERPL-CCNC: 448 U/L (ref 33–136)
ALT SERPL W P-5'-P-CCNC: 405 U/L (ref 7–45)
ANION GAP SERPL CALC-SCNC: 10 MMOL/L (ref 10–20)
AST SERPL W P-5'-P-CCNC: 253 U/L (ref 9–39)
BACTERIA BLD AEROBE CULT: ABNORMAL
BACTERIA BLD CULT: ABNORMAL
BASOPHILS # BLD AUTO: 0.02 X10*3/UL (ref 0–0.1)
BASOPHILS NFR BLD AUTO: 0.2 %
BILIRUB SERPL-MCNC: 5.2 MG/DL (ref 0–1.2)
BUN SERPL-MCNC: 21 MG/DL (ref 6–23)
CALCIUM SERPL-MCNC: 8.3 MG/DL (ref 8.6–10.6)
CHLORIDE SERPL-SCNC: 98 MMOL/L (ref 98–107)
CO2 SERPL-SCNC: 30 MMOL/L (ref 21–32)
CREAT SERPL-MCNC: 0.41 MG/DL (ref 0.5–1.05)
DACRYOCYTES BLD QL SMEAR: NORMAL
EGFRCR SERPLBLD CKD-EPI 2021: >90 ML/MIN/1.73M*2
EJECTION FRACTION APICAL 4 CHAMBER: 79.3
EJECTION FRACTION: 78 %
EOSINOPHIL # BLD AUTO: 0 X10*3/UL (ref 0–0.7)
EOSINOPHIL NFR BLD AUTO: 0 %
ERYTHROCYTE [DISTWIDTH] IN BLOOD BY AUTOMATED COUNT: 24.7 % (ref 11.5–14.5)
GLUCOSE SERPL-MCNC: 116 MG/DL (ref 74–99)
GRAM STN SPEC: ABNORMAL
HCT VFR BLD AUTO: 29.5 % (ref 36–46)
HGB BLD-MCNC: 10.1 G/DL (ref 12–16)
IMM GRANULOCYTES # BLD AUTO: 0.18 X10*3/UL (ref 0–0.7)
IMM GRANULOCYTES NFR BLD AUTO: 1.4 % (ref 0–0.9)
LEFT ATRIUM VOLUME AREA LENGTH INDEX BSA: 26.7 ML/M2
LEFT VENTRICLE INTERNAL DIMENSION DIASTOLE: 5.32 CM (ref 3.5–6)
LYMPHOCYTES # BLD AUTO: 0.73 X10*3/UL (ref 1.2–4.8)
LYMPHOCYTES NFR BLD AUTO: 5.5 %
MAGNESIUM SERPL-MCNC: 2 MG/DL (ref 1.6–2.4)
MCH RBC QN AUTO: 27.5 PG (ref 26–34)
MCHC RBC AUTO-ENTMCNC: 34.2 G/DL (ref 32–36)
MCV RBC AUTO: 80 FL (ref 80–100)
MONOCYTES # BLD AUTO: 0.97 X10*3/UL (ref 0.1–1)
MONOCYTES NFR BLD AUTO: 7.3 %
NEUTROPHILS # BLD AUTO: 11.4 X10*3/UL (ref 1.2–7.7)
NEUTROPHILS NFR BLD AUTO: 85.6 %
NRBC BLD-RTO: 0.5 /100 WBCS (ref 0–0)
OVALOCYTES BLD QL SMEAR: NORMAL
PLATELET # BLD AUTO: 115 X10*3/UL (ref 150–450)
POLYCHROMASIA BLD QL SMEAR: NORMAL
POTASSIUM SERPL-SCNC: 3.2 MMOL/L (ref 3.5–5.3)
PROT SERPL-MCNC: 4.7 G/DL (ref 6.4–8.2)
RBC # BLD AUTO: 3.67 X10*6/UL (ref 4–5.2)
RBC MORPH BLD: NORMAL
SCHISTOCYTES BLD QL SMEAR: NORMAL
SODIUM SERPL-SCNC: 135 MMOL/L (ref 136–145)
TARGETS BLD QL SMEAR: NORMAL
WBC # BLD AUTO: 13.3 X10*3/UL (ref 4.4–11.3)

## 2024-03-12 PROCEDURE — 99152 MOD SED SAME PHYS/QHP 5/>YRS: CPT

## 2024-03-12 PROCEDURE — 83735 ASSAY OF MAGNESIUM: CPT

## 2024-03-12 PROCEDURE — 2500000004 HC RX 250 GENERAL PHARMACY W/ HCPCS (ALT 636 FOR OP/ED)

## 2024-03-12 PROCEDURE — 2500000002 HC RX 250 W HCPCS SELF ADMINISTERED DRUGS (ALT 637 FOR MEDICARE OP, ALT 636 FOR OP/ED)

## 2024-03-12 PROCEDURE — 2500000001 HC RX 250 WO HCPCS SELF ADMINISTERED DRUGS (ALT 637 FOR MEDICARE OP)

## 2024-03-12 PROCEDURE — 2500000004 HC RX 250 GENERAL PHARMACY W/ HCPCS (ALT 636 FOR OP/ED): Performed by: RADIOLOGY

## 2024-03-12 PROCEDURE — 93308 TTE F-UP OR LMTD: CPT | Performed by: INTERNAL MEDICINE

## 2024-03-12 PROCEDURE — 36590 REMOVAL TUNNELED CV CATH: CPT | Performed by: RADIOLOGY

## 2024-03-12 PROCEDURE — 93308 TTE F-UP OR LMTD: CPT

## 2024-03-12 PROCEDURE — 99153 MOD SED SAME PHYS/QHP EA: CPT | Performed by: RADIOLOGY

## 2024-03-12 PROCEDURE — 36415 COLL VENOUS BLD VENIPUNCTURE: CPT

## 2024-03-12 PROCEDURE — 97165 OT EVAL LOW COMPLEX 30 MIN: CPT | Mod: GO

## 2024-03-12 PROCEDURE — 87040 BLOOD CULTURE FOR BACTERIA: CPT

## 2024-03-12 PROCEDURE — 99152 MOD SED SAME PHYS/QHP 5/>YRS: CPT | Performed by: RADIOLOGY

## 2024-03-12 PROCEDURE — 99232 SBSQ HOSP IP/OBS MODERATE 35: CPT

## 2024-03-12 PROCEDURE — A4217 STERILE WATER/SALINE, 500 ML: HCPCS

## 2024-03-12 PROCEDURE — 85025 COMPLETE CBC W/AUTO DIFF WBC: CPT

## 2024-03-12 PROCEDURE — 84075 ASSAY ALKALINE PHOSPHATASE: CPT

## 2024-03-12 PROCEDURE — RXMED WILLOW AMBULATORY MEDICATION CHARGE

## 2024-03-12 PROCEDURE — 2060000001 HC INTERMEDIATE ICU ROOM DAILY

## 2024-03-12 PROCEDURE — 93325 DOPPLER ECHO COLOR FLOW MAPG: CPT | Performed by: INTERNAL MEDICINE

## 2024-03-12 RX ORDER — FENTANYL CITRATE 50 UG/ML
INJECTION, SOLUTION INTRAMUSCULAR; INTRAVENOUS
Status: COMPLETED | OUTPATIENT
Start: 2024-03-12 | End: 2024-03-12

## 2024-03-12 RX ORDER — DEXAMETHASONE 4 MG/1
4 TABLET ORAL 2 TIMES DAILY
Qty: 60 TABLET | Refills: 0 | Status: SHIPPED | OUTPATIENT
Start: 2024-03-12 | End: 2024-04-11

## 2024-03-12 RX ORDER — MIDAZOLAM HYDROCHLORIDE 1 MG/ML
INJECTION INTRAMUSCULAR; INTRAVENOUS
Status: COMPLETED | OUTPATIENT
Start: 2024-03-12 | End: 2024-03-12

## 2024-03-12 RX ORDER — VANCOMYCIN HYDROCHLORIDE 1 G/20ML
INJECTION, POWDER, LYOPHILIZED, FOR SOLUTION INTRAVENOUS DAILY PRN
Status: DISCONTINUED | OUTPATIENT
Start: 2024-03-12 | End: 2024-03-13 | Stop reason: HOSPADM

## 2024-03-12 RX ORDER — POTASSIUM CHLORIDE 20 MEQ/1
40 TABLET, EXTENDED RELEASE ORAL ONCE
Status: COMPLETED | OUTPATIENT
Start: 2024-03-12 | End: 2024-03-12

## 2024-03-12 RX ORDER — POTASSIUM CHLORIDE 1.5 G/1.58G
40 POWDER, FOR SOLUTION ORAL ONCE
Status: DISCONTINUED | OUTPATIENT
Start: 2024-03-12 | End: 2024-03-12

## 2024-03-12 RX ORDER — PANTOPRAZOLE SODIUM 40 MG/1
40 TABLET, DELAYED RELEASE ORAL
Qty: 30 TABLET | Refills: 1 | Status: SHIPPED | OUTPATIENT
Start: 2024-03-13 | End: 2024-05-12

## 2024-03-12 RX ORDER — AMOXICILLIN AND CLAVULANATE POTASSIUM 875; 125 MG/1; MG/1
875 TABLET, FILM COATED ORAL 2 TIMES DAILY
Qty: 24 TABLET | Refills: 0 | Status: SHIPPED | OUTPATIENT
Start: 2024-03-12 | End: 2024-03-24

## 2024-03-12 RX ADMIN — AMPICILLIN SODIUM 2 G: 2 INJECTION, POWDER, FOR SOLUTION INTRAMUSCULAR; INTRAVENOUS at 17:06

## 2024-03-12 RX ADMIN — FENTANYL CITRATE 50 MCG: 50 INJECTION, SOLUTION INTRAMUSCULAR; INTRAVENOUS at 11:37

## 2024-03-12 RX ADMIN — VANCOMYCIN HYDROCHLORIDE 1250 MG: 5 INJECTION, POWDER, LYOPHILIZED, FOR SOLUTION INTRAVENOUS at 23:08

## 2024-03-12 RX ADMIN — AMPICILLIN SODIUM 2 G: 2 INJECTION, POWDER, FOR SOLUTION INTRAMUSCULAR; INTRAVENOUS at 00:32

## 2024-03-12 RX ADMIN — DEXAMETHASONE 4 MG: 4 TABLET ORAL at 08:24

## 2024-03-12 RX ADMIN — DEXAMETHASONE 4 MG: 4 TABLET ORAL at 17:06

## 2024-03-12 RX ADMIN — POTASSIUM CHLORIDE 40 MEQ: 1500 TABLET, EXTENDED RELEASE ORAL at 08:22

## 2024-03-12 RX ADMIN — FENTANYL CITRATE 50 MCG: 50 INJECTION, SOLUTION INTRAMUSCULAR; INTRAVENOUS at 11:30

## 2024-03-12 RX ADMIN — VANCOMYCIN HYDROCHLORIDE 1250 MG: 5 INJECTION, POWDER, LYOPHILIZED, FOR SOLUTION INTRAVENOUS at 12:13

## 2024-03-12 RX ADMIN — LEVOTHYROXINE SODIUM 100 MCG: 100 TABLET ORAL at 06:22

## 2024-03-12 RX ADMIN — POTASSIUM CHLORIDE 40 MEQ: 1500 TABLET, EXTENDED RELEASE ORAL at 12:13

## 2024-03-12 RX ADMIN — MIDAZOLAM HYDROCHLORIDE 1 MG: 1 INJECTION, SOLUTION INTRAMUSCULAR; INTRAVENOUS at 11:37

## 2024-03-12 RX ADMIN — AMPICILLIN SODIUM 2 G: 2 INJECTION, POWDER, FOR SOLUTION INTRAMUSCULAR; INTRAVENOUS at 04:21

## 2024-03-12 RX ADMIN — PAROXETINE HYDROCHLORIDE 10 MG: 10 TABLET, FILM COATED ORAL at 08:22

## 2024-03-12 RX ADMIN — AMPICILLIN SODIUM 2 G: 2 INJECTION, POWDER, FOR SOLUTION INTRAMUSCULAR; INTRAVENOUS at 08:22

## 2024-03-12 RX ADMIN — MIDAZOLAM HYDROCHLORIDE 1 MG: 1 INJECTION, SOLUTION INTRAMUSCULAR; INTRAVENOUS at 11:30

## 2024-03-12 RX ADMIN — AMPICILLIN SODIUM 2 G: 2 INJECTION, POWDER, FOR SOLUTION INTRAMUSCULAR; INTRAVENOUS at 21:29

## 2024-03-12 ASSESSMENT — COGNITIVE AND FUNCTIONAL STATUS - GENERAL
DRESSING REGULAR LOWER BODY CLOTHING: A LITTLE
HELP NEEDED FOR BATHING: A LITTLE
DRESSING REGULAR LOWER BODY CLOTHING: A LITTLE
DAILY ACTIVITIY SCORE: 20
MOBILITY SCORE: 20
CLIMB 3 TO 5 STEPS WITH RAILING: A LITTLE
MOBILITY SCORE: 20
TOILETING: A LITTLE
HELP NEEDED FOR BATHING: A LITTLE
MOVING TO AND FROM BED TO CHAIR: A LITTLE
STANDING UP FROM CHAIR USING ARMS: A LITTLE
CLIMB 3 TO 5 STEPS WITH RAILING: A LITTLE
DAILY ACTIVITIY SCORE: 19
PERSONAL GROOMING: A LITTLE
MOVING TO AND FROM BED TO CHAIR: A LITTLE
DAILY ACTIVITIY SCORE: 24
WALKING IN HOSPITAL ROOM: A LITTLE
TOILETING: A LITTLE
PERSONAL GROOMING: A LITTLE
WALKING IN HOSPITAL ROOM: A LITTLE
STANDING UP FROM CHAIR USING ARMS: A LITTLE
DRESSING REGULAR UPPER BODY CLOTHING: A LITTLE

## 2024-03-12 ASSESSMENT — PAIN - FUNCTIONAL ASSESSMENT
PAIN_FUNCTIONAL_ASSESSMENT: 0-10

## 2024-03-12 ASSESSMENT — PAIN SCALES - GENERAL
PAINLEVEL_OUTOF10: 0 - NO PAIN

## 2024-03-12 ASSESSMENT — ACTIVITIES OF DAILY LIVING (ADL): BATHING_ASSISTANCE: MINIMAL

## 2024-03-12 NOTE — CARE PLAN
The patient's goals for the shift include      The clinical goals for the shift include Patient will remain free from falls and injury throughout shift 3/12/2024.      Problem: Pain - Adult  Goal: Verbalizes/displays adequate comfort level or baseline comfort level  Outcome: Progressing     Problem: Safety - Adult  Goal: Free from fall injury  Outcome: Progressing     Problem: Discharge Planning  Goal: Discharge to home or other facility with appropriate resources  Outcome: Progressing     Problem: Chronic Conditions and Co-morbidities  Goal: Patient's chronic conditions and co-morbidity symptoms are monitored and maintained or improved  Outcome: Progressing     Problem: Fall/Injury  Goal: Not fall by end of shift  Outcome: Progressing  Goal: Be free from injury by end of the shift  Outcome: Progressing  Goal: Verbalize understanding of personal risk factors for fall in the hospital  Outcome: Progressing  Goal: Verbalize understanding of risk factor reduction measures to prevent injury from fall in the home  Outcome: Progressing  Goal: Use assistive devices by end of the shift  Outcome: Progressing  Goal: Pace activities to prevent fatigue by end of the shift  Outcome: Progressing     Problem: Infection related to problem list condition  Goal: Infection will resolve through treatment  Outcome: Progressing

## 2024-03-12 NOTE — PROGRESS NOTES
Occupational Therapy    Evaluation    Patient Name: Chio Turner  MRN: [id number]  Today's Date: 3/12/2024  Time Calculation  Start Time: 0935  Stop Time: 0946  Time Calculation (min): 11 min    Assessment  IP OT Assessment  OT Assessment: difficulty I/ADLs, safety, fxnl mob  Prognosis: Good  Barriers to Discharge: None  Evaluation/Treatment Tolerance: Patient tolerated treatment well  Medical Staff Made Aware: Yes  End of Session Communication: Bedside nurse  End of Session Patient Position: Up in chair, Alarm off, caregiver present  Plan:  Treatment Interventions: ADL retraining, Functional transfer training, UE strengthening/ROM, Endurance training, Patient/family training, Equipment evaluation/education, Compensatory technique education  OT Frequency: 2 times per week  OT Discharge Recommendations: Low intensity level of continued care  Equipment Recommended upon Discharge:  (long handled sponge)  OT Recommended Transfer Status: Assist of 1  OT - OK to Discharge: Yes    Subjective   Current Problem:  1. Cancer (CMS/HCC)        2. Cerebellar lesion        3. Gastroesophageal reflux disease, unspecified whether esophagitis present        4. Enterococcus faecalis infection          General:  General  Reason for Referral: elevated labs  Past Medical History Relevant to Rehab: 63 y.o. female with a past medical history of stage IV right-sided breast ductal carcinoma diagnosed in June 2015 with metastatic disease to the lungs and liver and now brain admitted on 3/8/24 from infusion clinic with elevated LFTs.  Family/Caregiver Present: Yes  Caregiver Feedback:  present  Prior to Session Communication: Bedside nurse  Patient Position Received: Up in chair, Alarm off, caregiver present  General Comment:  reports A post d/c, had homecare agency prior open to having same agency post d/c  Precautions:  Medical Precautions: Fall precautions  Vital Signs:     Pain:  Pain Assessment  Pain Assessment: 0-10  Pain  Score: 0 - No pain    Objective   Cognition:  Overall Cognitive Status: Within Functional Limits  Orientation Level: Oriented X4  Insight: Mild           Home Living:  Type of Home: Condo  Lives With: Spouse  Home Adaptive Equipment:  (WhW, shower chair, cane, reacher)  Home Layout:  (1 LISSETTE, 1 level)   Prior Function:  Level of [place]: Independent with ADLs and functional transfers  Receives Help From:  ( A cooking)  Vocational: Retired ()  Leisure: +dog  Hand Dominance: Right  IADL History:  IADL Comments: I cleaning, +drives, - falls  ADL:  Eating Assistance: Independent  Grooming Assistance:  (min A anticipated standing WHW)  Bathing Assistance: Minimal (anticipated seated)  UE Dressing Assistance: Independent  LE Dressing Assistance:  (seated chair pt brought foot to self SBA)  Toileting Assistance with Device: Minimal (anticipated WhW)  Functional Deficit: Setup, Steadying, Verbal cueing, Supervision/safety, Increased time to complete  Activity Tolerance:  Endurance:  (fair)  Bed Mobility/Transfers:      Transfer 1  Transfer Level of Assistance 1:  (sit to stand mod A WhW, stand to sit min A WhW)      Functional Mobility:  Functional Mobility  Functional Mobility Performed:  (pt performed fxnl mob to/from chair/door/bench in room min-mod A x1 retro lean, WHW)  Sitting Balance:  Static Sitting Balance  Static Sitting-Level of Assistance: Independent  Dynamic Sitting Balance  Dynamic Sitting-Balance:  (I)  Standing Balance:  Static Standing Balance  Static Standing-Level of Assistance: Minimum assistance (WhW)  Dynamic Standing Balance  Dynamic Standing-Balance:  (min-mod A WhW)       IADL's:   IADL Comments: I cleaning, +drives, - falls  Vision: Vision - Basic Assessment  Current Vision: Wears contacts  Visual History:  (reports blurry)  Sensation:  Light Touch: No apparent deficits  Strength:  Strength Comments: BUE 4+/5  wfl    Coordination:  Movements are Fluid and Coordinated: Yes    Hand Function:  Hand Function  Gross Grasp: Functional  Extremities: RUE   RUE : Within Functional Limits and LUE   LUE: Within Functional Limits      Outcome Measures: [facility] Daily Activity  Putting on and taking off regular lower body clothing: A little  Bathing (including washing, rinsing, drying): A little  Putting on and taking off regular upper body clothing: None  Toileting, which includes using toilet, bedpan or urinal: A little  Taking care of personal grooming such as brushing teeth: A little  Eating Meals: None  Daily Activity - Total Score: 20         and OT Adult Other Outcome Measures  4AT: -  Education Documentation  Body Mechanics, taught by Sandra Churchill OT at 3/12/2024 10:17 AM.  Learner: Significant Other, Patient  Readiness: Acceptance  Method: Explanation  Response: Verbalizes Understanding    Precautions, taught by Sandra Churchill OT at 3/12/2024 10:17 AM.  Learner: Significant Other, Patient  Readiness: Acceptance  Method: Explanation  Response: Verbalizes Understanding    ADL Training, taught by Sandra Churchill OT at 3/12/2024 10:17 AM.  Learner: Significant Other, Patient  Readiness: Acceptance  Method: Explanation  Response: Verbalizes Understanding    Education Comments  No comments found.      Goals:   Encounter Problems       Encounter Problems (Active)       ADLs       Patient will perform UB and LB bathing  with modified independent level of assistance and AE. (Progressing)       Start:  03/12/24    Expected End:  04/02/24            Patient with complete upper body dressing with independent  (Progressing)       Start:  03/12/24    Expected End:  04/02/24            Patient with complete lower body dressing with modified independent level of assistance donning and doffing all LE clothes  with PRN adaptive equipment  (Progressing)       Start:  03/12/24    Expected End:  04/02/24            Patient will complete daily grooming tasks  with independent level of assistance   (Progressing)       Start:  03/12/24    Expected End:  04/02/24            Patient will complete toileting including hygiene clothing management/hygiene with modified independent level of assistance and LRD. (Progressing)       Start:  03/12/24    Expected End:  04/02/24               MOBILITY       Patient will perform Functional mobility mod  Household distances/Community Distances with stand by assist level of assistance and least restrictive device in order to improve safety and functional mobility. (Progressing)       Start:  03/12/24    Expected End:  04/02/24               TRANSFERS       Patient will perform bed mobility independent level  (Progressing)       Start:  03/12/24    Expected End:  04/02/24            Patient will complete functional transfer with least restrictive device with stand by assist level of assistance. (Progressing)       Start:  03/12/24    Expected End:  04/02/24

## 2024-03-12 NOTE — HOSPITAL COURSE
Chio Turner is a 63 y.o. female with a past medical history of stage IV right-sided breast ductal carcinoma (ER+ (90%), [place] + (70%), HER-2-) diagnosed in June 2015 following with Dr. Kahn with metastatic disease to the lungs and liver and now brain admitted on 3/8/24 from infusion clinic with elevated LFTs.  The patient states that she was in her usual state of health and presented to the infusion center and was found that her LFTs were elevated.  She stated that she feels well overall with no acute concerns.  On workup, blood culture positive for Enterococcus faecalis and was treated with Ampicillin and Vancomycin. Hepatology consulted and stated that mixed pattern transaminase elevation with direct hyperbilirubinemia is most likely from liver mets and progressively worsening breast cancer disease burden. Likely presentation of elevated LFTs 2/2 visceral crisis from liver mets. Patient's Mediport removed with IR in setting of infection. TTE in setting of bacteremia showing no evidence of vegetations. She will be sent home with Augmentin (total 14d course of antibiotics) and will follow up with Dr. Kahn regarding further management of treatment plans outpatient next week. Patient's condition remained stable throughout the hospital course.

## 2024-03-12 NOTE — PROGRESS NOTES
Internal Medicine Progress Note   [facility]  March 12, 2024    Patient: Chio Turner    Medical Record: [id number]    Chio Turner is a 63 y.o. female with a past medical history of stage IV right-sided breast ductal carcinoma (ER+ (90%), [place] + (70%), HER-2-) diagnosed in June 2015 following with Dr. Kahn with metastatic disease to the lungs and liver and now brain admitted on 3/8/24 from infusion clinic with elevated LFTs.      Subjective   No events overnight. Patient hopeful for port removal today. Wanting to return home soon. No other concerns this AM.   Medications   Medications:   Scheduled medications  amLODIPine, 5 mg, oral, Daily  ampicillin, 2 g, intravenous, q4h  chlorthalidone, 25 mg, oral, Daily  dexAMETHasone, 4 mg, oral, BID  levothyroxine, 100 mcg, oral, Daily before breakfast  pantoprazole, 40 mg, oral, Daily before breakfast  PARoxetine, 10 mg, oral, Daily  vancomycin, 1,250 mg, intravenous, q12h      Continuous medications     PRN medications  PRN medications: loperamide, ondansetron, prochlorperazine, vancomycin     Objective   Vitals  Visit Vitals  /81 (BP Location: Left arm, Patient Position: Lying)   Pulse 71   Temp 35.9 °C (96.6 °F) (Temporal)   Resp 16        Intake/Output  I/O last 3 completed shifts:  In: 1400 [IV Piggyback:1400]  Out: -     Physical Exam  Constitutional:       Appearance: Normal appearance.   Eyes:      Extraocular Movements: Extraocular movements intact.      Conjunctiva/sclera: Conjunctivae normal.   Cardiovascular:      Rate and Rhythm: Normal rate and regular rhythm.      Pulses: Normal pulses.      Heart sounds: Normal heart sounds.   Pulmonary:      Effort: Pulmonary effort is normal.      Breath sounds: Normal breath sounds.   Abdominal:      General: Abdomen is flat. Bowel sounds are normal.      Palpations: Abdomen is soft.   Neurological:      General: No focal deficit present.      Mental Status: She is alert and  oriented to person, place, and time.   Psychiatric:         Mood and Affect: Mood normal.         Thought Content: Thought content normal.        Labs  Results from last 7 days   Lab Units 03/12/24  0629 03/11/24  0637 03/10/24  0638   WBC AUTO x10*3/uL 13.3* 14.8* 12.9*   HEMOGLOBIN g/dL 10.1* 10.6* 10.5*   HEMATOCRIT % 29.5* 29.9* 30.5*   PLATELETS AUTO x10*3/uL 115* 131* 128*       Results from last 7 days   Lab Units 03/12/24  0629 03/11/24  0637 03/10/24  0638   SODIUM mmol/L 135* 134* 134*   POTASSIUM mmol/L 3.2* 3.0* 3.6   CO2 mmol/L 30 28 26   ANION GAP mmol/L 10 13 14   BUN mg/dL 21 23 26*   CREATININE mg/dL 0.41* 0.42* 0.46*   GLUCOSE mg/dL 116* 100* 138*   EGFR mL/min/1.73m*2 >90 >90 >90   MAGNESIUM mg/dL 2.00 2.03 2.07        Results from last 7 days   Lab Units 03/12/24  0629 03/11/24  0637 03/10/24  0638   ALT U/L 405* 390* 399*   AST U/L 253* 240* 229*   ALK PHOS U/L 448* 467* 447*            Imaging  CT ANGIO HEAD AND NECK W AND WO IV CONTRAST 2/21/24:  1. 2 hyperdense cerebellar masses, the largest measures 10 x 14 mm,  most consistent with metastatic disease. The larger lesion is located  in the cerebellar vermis and associated with mild edema. No  significant mass effect. Questionable small mass involving the right  lentiform nucleus. MRI brain with contrast is more sensitive  evaluation for metastatic disease and may be considered.     2. No significant stenosis of the cervical carotid or vertebral  arteries.     3. No intracranial major branch occlusion or hemodynamically  significant stenosis.     4. Partially imaged pulmonary/intrathoracic metastatic disease.     MR BRAIN 2/23/24:  Hemorrhagic lesions in the cerebellar vermis and right parietal lobe  adjacent to the splenium of the corpus callosum along with a few  small enhancing cerebellar lesions are nonspecific. Consider  hemorrhagic metastases as well as hemorrhages due to slow flow  vascular malformations.     MRCP 2/22/24:  On the most  recent comparison exam of the abdomen and pelvis, CT with  contrast 15 December 2023, there were just a few liver lesions  suspect for metastases; today, too numerous to count new variably  sized liver metastases, likely 100 or more.       No other contributory abnormalities      No biliary duct dilation      No choledocholithiasis      No pancreatic duct dilation      Edematous gallbladder wall thickening, probably due to hepatic  dysfunction. No gallstones or gallbladder dilation      Away from the liver, no other metastatic disease in the abdomen      No stigmata of carcinomatosis such as free fluid or omental or  peritoneal implants      Known right adrenal myelolipoma    RUQ US 3/9/24:  1. Hepatomegaly with innumerable metastatic lesions that are at least  unchanged, if not slightly progressed, noting intrinsic limitations  with regard to intermodality comparison.  2. Unchanged thickened gallbladder wall without distension,  pericholecystic fluid or cholelithiasis due to hepatocellular disease.         Assessment/Plan   Chio Turner is a 63 y.o. female with a past medical history of stage IV right-sided breast ductal carcinoma (ER+ (90%), [place] + (70%), HER-2-) diagnosed in June 2015 with metastatic disease to the lungs and liver and now brain admitted on 3/8/24 from infusion clinic with elevated LFTs. Likely that elevated transaminases are in the setting of worsening metastatic disease vs biliary obstruction vs DILI vs viral etiology. R-factor score 3.0 showing mixed hepatocellular and cholestatic injury. Patient currently asymptomatic and reports being in her usual state of health, no abdominal pain, n/v, f/c. Labs, RUQ US pending.      Updates 3/12:   - Patient would like treatment for bacteremia today, hopeful that this will help with elevated LFTs   - s/p mediport removal in setting of bacteremia 3/12  - Continue Vanc and Ampicillin for Enterococcus faecalis bactermeia  - Plan for TTE today to r/o  "endocarditis       #Elevated LFTs in the setting of metastatic disease to liver   #Possible visceral crisis   #Hyperbilirubinemia   -R-factor score 3.0; showing mixed hepatocellular and cholestatic injury   -MRCP at OSH 2/27/24 showed \"too numerous to count new variably sized liver metastases, likely 100 or more \"  -Was taking Tumeric supplement, other vitamin supplements at home but discontinued this during the week; no other new medications reported  - Etiologies include worsening metastatic disease with visceral crisis vs biliary obstruction vs DILI vs viral etiology  - RUQ showing similar findings to MRCP 2/22/24 with hepatomegaly and innumerable metastatic lesions   - Patient continues to remain asymptomatic  - Hepatology consulted for visceral crisis vs possible obstruction, we appreciate the recommendations   - Hepatitis serologies neg  - Direct bilirubin 1.7, coags wnl   Plan:    - Anti-sm muscle antibody, ceruloplasmin, AFP, anti-mitochondrial antibody pending   - Patient would like treatment for bacteremia today, hopeful that this will help with elevated LFTs   - s/p mediport removal in setting of bacteremia   - Continue Vanc (3/12 - )and Ampicillin (3/11 - ) for Enterococcus faecalis bactermeia  - Plan for TTE today   - Patient spoke with Dr. Kahn today, would like treatment for infection prior to going home   - Per hepatology, recommend supportive care as etiology      #Enterococcus faecalis bacteremia   #Leukocytosis iso steroid usage, improving   - Likely steroid induced, pt has been on steroids since admission at [facility] with findings of metastatic disease to the brain   - Enterococcus faecalis bacteremia 3/10 cultures; started on Vancomycin and Cefazolin 3/10, switched to Ampicillin 3/11 + Vanc 3/12    #Stage IV right-sided breast ductal carcinoma (ER+, [place]+, HER2-) diagnosed in June 2015 with metastatic disease to the lungs, liver, brain  - Follows with Dr. Kahn, last seen on 3/5/24   - Planned to " have first dose Sacituzumab on 03/08/2024 if liver enzymes trending down, but patient presented with elevated LFTs      #Cerebellar mass likely from metastatic in etiology  -MRI brain showed Hemorrhagic lesions in the cerebellar vermis and right parietal lobe adjacent to the splenium of the corpus callosum along with a few small enhancing cerebellar lesions are nonspecific. Consider hemorrhagic metastases as well as hemorrhages due to slow flow vascular malformations.  - Patient saw [facility] 3[place]5 - advised to wean dexamethasone from 6 mg 4 times daily, to 6 mg twice daily.  Dr. Contreras may further reduce this dosage and then coordinate possible radiosurgery if he deems her to be an appropriate candidate.   - Patient reports taking Decadron 4mg BID  -No further lightheadedness, weakness, dizziness, or other sx   Plan:   - Continue Decadron 4mg BID   - Holding any AC for now iso possible hemorrhagic mets on MRI   -PT/OT eval, follows with outpatient PT/OT      #Hypothyroidism   - Continue levothyroxine 100 mcg orally daily      #Hypertension  -amlodipine 5 mg orally daily  -25 mg orally daily  -HCTZ for now  -Ctm Bps      #Anxiety  -Continue Paxil and Atarax     Fluids: Replete PRN  Electrolytes: Keep mg >2, phos >3  and K >4  Nutrition:  Adult diet Regular   Antimicrobials:   DVT PPX: SCD's in the setting of hemorrhagic mets to brain   Bowel Care: Miralax  Lines: PIV     Code Status: DNR/DNI - OK for ICU (confirmed on admission)  NOK:  Primary Emergency Contact: Ely,Daniel, Home Phone: [phone]        Patient and plan discussed with attending physician Dr. Bryan.       SIGNATURE: Yovana Stewart MD PATIENT NAME: Chio Turner   DATE: March 12, 2024 MRN: [id number]

## 2024-03-12 NOTE — CONSULTS
"Vancomycin Dosing by Pharmacy- INITIAL    Chio Turner is a 63 y.o. year old female who Pharmacy has been consulted for vancomycin dosing for line infections. Based on the patient's indication and renal status this patient will be dosed based on a goal AUC of 400-600. Patient was previously on pharmacy to dose vancomycin for line infection, therapeutic AUC with 1250mg every 12 hours    Renal function is currently stable.    Visit Vitals  /78 (BP Location: Left arm, Patient Position: Lying)   Pulse 55   Temp 36.4 °C (97.5 °F) (Temporal)   Resp 16        Lab Results   Component Value Date    CREATININE 0.41 (L) 03/12/2024    CREATININE 0.42 (L) 03/11/2024    CREATININE 0.46 (L) 03/10/2024    CREATININE 0.54 03/09/2024        Patient weight is No results found for: \"PTWEIGHT\"    No results found for: \"CULTURE\"     I/O last 3 completed shifts:  In: 1400 [IV Piggyback:1400]  Out: -   [unfilled]    No results found for: \"PATIENTTEMP\"       Assessment/Plan     Patient will be restarted on 1250mg every 12 hours     This dosing regimen is predicted by [employer] to result in the following pharmacokinetic parameters:  AUC24,ss: 573 mg/L.hr  Probability of AUC24 > 400: 98 %  Ctrough,ss: 16.5 mg/L  Probability of Ctrough,ss > 20: 24 %    Follow-up level will be ordered on 3/14 with am labs unless clinically indicated sooner.  Will continue to monitor renal function daily while on vancomycin and order serum creatinine at least every 48 hours if not already ordered.  Follow for continued vancomycin needs, clinical response, and signs/symptoms of toxicity.       Richa Campbell, PharmD       "

## 2024-03-12 NOTE — PRE-PROCEDURE NOTE
Interventional Radiology Preprocedure Note    Indication for procedure: The primary encounter diagnosis was Cancer (CMS/HCC). Diagnoses of Cerebellar lesion, Gastroesophageal reflux disease, unspecified whether esophagitis present, and Enterococcus faecalis infection were also pertinent to this visit.    Procedure: Mediport Removal    Relevant review of systems: NA    Relevant Labs:   Lab Results   Component Value Date    CREATININE 0.41 (L) 03/12/2024    EGFR >90 03/12/2024    INR 1.0 03/09/2024    PROTIME 11.2 03/09/2024       Planned Sedation/Anesthesia: Minimal    Airway assessment: normal    Directed physical examination:    Aox3  Normal rate of respirations    Mallampati: II (hard and soft palate, upper portion of tonsils anduvula visible)    ASA Score: ASA 2 - Patient with mild systemic disease with no functional limitations    Benefits, risks and alternatives of procedure and planned sedation have been discussed with the patient and/or their representative. All questions answered and they agree to proceed.

## 2024-03-12 NOTE — DISCHARGE INSTRUCTIONS
Dear Chio Turner,     It was a pleasure caring for you. You were admitted to [facility] on 3/8/2024 for elevated liver function enzymes.  While you were here, we treated you for a blood infection with antibiotics. You were seen by hepatology who did not recommend any acute interventions at this time. You will be discharged home with antibiotics. Please come to the emergency department if you develop any worsening symptoms. We wish you the very best.     For you to do:  Please take all of your medications as prescribed.  Please finish your course of antibiotics (Take Augmentin twice per day for total of 12 days)   Please follow up with Dr. Kahn regarding treatment options  Please continue taking your steroids and follow up with oncology or neurosurgery related to course of steroids        Thank you for letting us take part in your care,   [facility]

## 2024-03-13 VITALS
SYSTOLIC BLOOD PRESSURE: 126 MMHG | HEART RATE: 75 BPM | RESPIRATION RATE: 18 BRPM | TEMPERATURE: 97.9 F | OXYGEN SATURATION: 94 % | DIASTOLIC BLOOD PRESSURE: 85 MMHG

## 2024-03-13 LAB
ALBUMIN SERPL BCP-MCNC: 3.1 G/DL (ref 3.4–5)
ALP SERPL-CCNC: 599 U/L (ref 33–136)
ALT SERPL W P-5'-P-CCNC: 487 U/L (ref 7–45)
ANION GAP SERPL CALC-SCNC: 16 MMOL/L (ref 10–20)
AST SERPL W P-5'-P-CCNC: 313 U/L (ref 9–39)
BACTERIA BLD AEROBE CULT: ABNORMAL
BACTERIA BLD CULT: ABNORMAL
BASOPHILS # BLD AUTO: 0.04 X10*3/UL (ref 0–0.1)
BASOPHILS NFR BLD AUTO: 0.2 %
BILIRUB SERPL-MCNC: 7.1 MG/DL (ref 0–1.2)
BUN SERPL-MCNC: 27 MG/DL (ref 6–23)
CALCIUM SERPL-MCNC: 8.9 MG/DL (ref 8.6–10.6)
CHLORIDE SERPL-SCNC: 99 MMOL/L (ref 98–107)
CO2 SERPL-SCNC: 25 MMOL/L (ref 21–32)
CREAT SERPL-MCNC: 0.53 MG/DL (ref 0.5–1.05)
DACRYOCYTES BLD QL SMEAR: NORMAL
EGFRCR SERPLBLD CKD-EPI 2021: >90 ML/MIN/1.73M*2
EOSINOPHIL # BLD AUTO: 0 X10*3/UL (ref 0–0.7)
EOSINOPHIL NFR BLD AUTO: 0 %
ERYTHROCYTE [DISTWIDTH] IN BLOOD BY AUTOMATED COUNT: 24.8 % (ref 11.5–14.5)
GLUCOSE SERPL-MCNC: 256 MG/DL (ref 74–99)
GRAM STN SPEC: ABNORMAL
GRAM STN SPEC: ABNORMAL
HCT VFR BLD AUTO: 35.8 % (ref 36–46)
HGB BLD-MCNC: 12 G/DL (ref 12–16)
IMM GRANULOCYTES # BLD AUTO: 0.38 X10*3/UL (ref 0–0.7)
IMM GRANULOCYTES NFR BLD AUTO: 2.1 % (ref 0–0.9)
LYMPHOCYTES # BLD AUTO: 0.68 X10*3/UL (ref 1.2–4.8)
LYMPHOCYTES NFR BLD AUTO: 3.7 %
MAGNESIUM SERPL-MCNC: 2.16 MG/DL (ref 1.6–2.4)
MCH RBC QN AUTO: 27.1 PG (ref 26–34)
MCHC RBC AUTO-ENTMCNC: 33.5 G/DL (ref 32–36)
MCV RBC AUTO: 81 FL (ref 80–100)
MONOCYTES # BLD AUTO: 1.33 X10*3/UL (ref 0.1–1)
MONOCYTES NFR BLD AUTO: 7.3 %
NEUTROPHILS # BLD AUTO: 15.79 X10*3/UL (ref 1.2–7.7)
NEUTROPHILS NFR BLD AUTO: 86.7 %
NRBC BLD-RTO: 1 /100 WBCS (ref 0–0)
OVALOCYTES BLD QL SMEAR: NORMAL
PLATELET # BLD AUTO: 170 X10*3/UL (ref 150–450)
POTASSIUM SERPL-SCNC: 3.6 MMOL/L (ref 3.5–5.3)
PROT SERPL-MCNC: 5.4 G/DL (ref 6.4–8.2)
RBC # BLD AUTO: 4.42 X10*6/UL (ref 4–5.2)
RBC MORPH BLD: NORMAL
SODIUM SERPL-SCNC: 136 MMOL/L (ref 136–145)
TARGETS BLD QL SMEAR: NORMAL
WBC # BLD AUTO: 18.2 X10*3/UL (ref 4.4–11.3)

## 2024-03-13 PROCEDURE — 99238 HOSP IP/OBS DSCHRG MGMT 30/<: CPT

## 2024-03-13 PROCEDURE — 83735 ASSAY OF MAGNESIUM: CPT

## 2024-03-13 PROCEDURE — 2500000001 HC RX 250 WO HCPCS SELF ADMINISTERED DRUGS (ALT 637 FOR MEDICARE OP)

## 2024-03-13 PROCEDURE — 36415 COLL VENOUS BLD VENIPUNCTURE: CPT

## 2024-03-13 PROCEDURE — 80053 COMPREHEN METABOLIC PANEL: CPT

## 2024-03-13 PROCEDURE — 85025 COMPLETE CBC W/AUTO DIFF WBC: CPT

## 2024-03-13 PROCEDURE — 2500000004 HC RX 250 GENERAL PHARMACY W/ HCPCS (ALT 636 FOR OP/ED)

## 2024-03-13 RX ADMIN — PANTOPRAZOLE SODIUM 40 MG: 40 TABLET, DELAYED RELEASE ORAL at 06:14

## 2024-03-13 RX ADMIN — LEVOTHYROXINE SODIUM 100 MCG: 100 TABLET ORAL at 06:14

## 2024-03-13 RX ADMIN — AMPICILLIN SODIUM 2 G: 2 INJECTION, POWDER, FOR SOLUTION INTRAMUSCULAR; INTRAVENOUS at 02:56

## 2024-03-13 RX ADMIN — DEXAMETHASONE 4 MG: 4 TABLET ORAL at 06:14

## 2024-03-13 RX ADMIN — PAROXETINE HYDROCHLORIDE 10 MG: 10 TABLET, FILM COATED ORAL at 10:39

## 2024-03-13 RX ADMIN — AMPICILLIN SODIUM 2 G: 2 INJECTION, POWDER, FOR SOLUTION INTRAMUSCULAR; INTRAVENOUS at 06:14

## 2024-03-13 RX ADMIN — AMLODIPINE BESYLATE 5 MG: 5 TABLET ORAL at 10:39

## 2024-03-13 RX ADMIN — CHLORTHALIDONE 25 MG: 25 TABLET ORAL at 10:39

## 2024-03-13 NOTE — DISCHARGE SUMMARY
Discharge Diagnosis  Visceral crisis 2/2 hepatic metastases, enterococcus faecalis bacteremia     Issues Requiring Follow-Up  #Enterococcus faecalis bacteremia   - Continue Augmentin for total of 12 days post discharge (total course 14d)   - TTE showing no evidence of vegetations     #Stage IV right-sided breast ductal carcinoma (ER+, IA+, HER2-) diagnosed in June 2015 with metastatic disease to the lungs, liver, brain  #Cerebellar mass likely from metastatic in etiology   - Follow up with Dr. Kahn outpatient   - Patient saw [facility] 3/5 - advised to wean dexamethasone from 6 mg 4 times daily, to 6 mg twice daily.  Now 4mg BID. Dr. Contreras may further reduce this dosage and then coordinate possible radiosurgery if he deems her to be an appropriate candidate.   - Continue Decadron 4mg BID, follow up with oncology     Test Results Pending At Discharge  Pending Labs       Order Current Status    CBC and Auto Differential Collected (03/13/24 0950)    Comprehensive metabolic panel Collected (03/13/24 0950)    Magnesium Collected (03/13/24 0950)    Blood Culture Preliminary result    Blood Culture Preliminary result            Hospital Course  Chio Turner is a 63 y.o. female with a past medical history of stage IV right-sided breast ductal carcinoma (ER+ (90%), IA + (70%), HER-2-) diagnosed in June 2015 following with Dr. Kahn with metastatic disease to the lungs and liver and now brain admitted on 3/8/24 from infusion clinic with elevated LFTs.  The patient states that she was in her usual state of health and presented to the infusion center and was found that her LFTs were elevated.  She stated that she feels well overall with no acute concerns.  On workup, blood culture positive for Enterococcus faecalis and was treated with Ampicillin and Vancomycin. Hepatology consulted and stated that mixed pattern transaminase elevation with direct hyperbilirubinemia is most likely from liver mets and progressively worsening breast  cancer disease burden. Likely presentation of elevated LFTs 2/2 visceral crisis from liver mets. Patient's Mediport removed with IR in setting of infection. TTE in setting of bacteremia showing no evidence of vegetations. She will be sent home with Augmentin (total 14d course of antibiotics) and will follow up with Dr. Kahn regarding further management of treatment plans outpatient next week. Patient's condition remained stable throughout the hospital course.     Pertinent Physical Exam At Time of Discharge  Constitutional:       Appearance: Normal appearance, sitting up in chair.   Eyes:      Extraocular Movements: Extraocular movements intact.      Conjunctiva/sclera: Conjunctivae normal.   Cardiovascular:      Rate and Rhythm: Normal rate and regular rhythm.      Pulses: Normal pulses.      Heart sounds: Normal heart sounds.   Pulmonary:      Effort: Pulmonary effort is normal.      Breath sounds: Normal breath sounds.   Abdominal:      General: Abdomen is flat. Bowel sounds are normal.      Palpations: Abdomen is soft.   Neurological:      General: No focal deficit present.      Mental Status: She is alert and oriented to person, place, and time.   Psychiatric:         Mood and Affect: Mood normal.         Thought Content: Thought content normal.     Home Medications     Medication List      START taking these medications     amoxicillin-pot clavulanate 875-125 mg tablet; Commonly known as:   Augmentin; Take 1 tablet (875 mg) by mouth 2 times a day for 12 days.   pantoprazole 40 mg EC tablet; Commonly known as: ProtoNix; Take 1 tablet   (40 mg) by mouth once daily in the morning. Take before meals. Do not   crush, chew, or split. Do not start before March 13, 2024.     CHANGE how you take these medications     dexAMETHasone 4 mg tablet; Commonly known as: Decadron; Take 1 tablet (4   mg) by mouth 2 times a day.; What changed: when to take this, Another   medication with the same name was removed. Continue  taking this   medication, and follow the directions you see here.     CONTINUE taking these medications     amLODIPine 5 mg tablet; Commonly known as: Norvasc   chlorthalidone 25 mg tablet; Commonly known as: Hygroton   levothyroxine 100 mcg tablet; Commonly known as: Synthroid, Levoxyl   loperamide 2 mg capsule; Commonly known as: Imodium; Take 2 capsules (4   mg) by mouth with the first episode of diarrhea and 1 capsule (2 mg) by   mouth with any additional episodes. Maximum 8 capsules (16 mg) per day.   ondansetron 8 mg tablet; Commonly known as: Zofran; Take 1 tablet (8 mg)   by mouth every 8 hours if needed for nausea or vomiting.   PaxiL 10 mg tablet; Generic drug: PARoxetine   potassium chloride CR 20 mEq ER tablet; Commonly known as: Klor-Con M20   prochlorperazine 10 mg tablet; Commonly known as: Compazine; Take 1   tablet (10 mg) by mouth every 6 hours if needed for nausea or vomiting.     STOP taking these medications     Kisqali 3 x 200 mg tablet therapy pack; Generic drug: ribociclib   OLANZapine 5 mg tablet; Commonly known as: ZyPREXA       Outpatient Follow-Up  Future Appointments   Date Time Provider Department Center   3/15/2024 11:00 AM INF [address] [place] [address]   3/26/2024 11:20 AM [facility] CENTRAL LINE 01 [id number] [facility]   3/26/2024 12:00 PM [id number]   3/26/2024  3:00 PM Tereso Contreras MD, MS [id number] [facility]   3/29/2024 11:00 AM INF [address] [place] [address]   4/5/2024 11:00 AM INF [address] [place] [address]       Yovana Stewart MD

## 2024-03-13 NOTE — CARE PLAN
Chio Turner is a 63 y.o. female with a past medical history of stage IV right-sided breast ductal carcinoma (ER+ (90%), OR + (70%), HER-2-) on faslodex and sacituzumab diagnosed in June 2015 following with Dr. Kahn with metastatic disease to the lungs and liver and now brain admitted on 3/8/24 from infusion clinic with elevated LFTs. ID consulted for enterococcus bacteremia.     #Enteroccocus faecalis bacteremia  #Elevated LFT  # stage IV right-sided breast ductal carcinoma (ER+ (90%), OR + (70%), HER-2-) on faslodex and sacituzumab, 100s of metastatic lesions in the liver  Patient with enterococcus faecalis bacteremia. Likely source is port of biliary pathology due to malignancy in the liver. Patient with no obvious port infection, but this should be removed given that enterococcus can seed there. Port removed on 3/12. Repeat blood cultures obtained on 3/12     -TTE with no vegetation  -ideally would discharge on ampicillin 2 q 4 hours.  -Given patient wishes for home hospice amoxicillin 1 g q8 was recommended     ID will sign off. If any questions regarding this patient please call Team pager.  Discussed with Dr. Patito Camacho  Infectious disease, PGY V  Team B pager [phone]  General ID pager [phone]

## 2024-03-13 NOTE — NURSING NOTE
Discharge paperwork provided and reviewed with patient and . All questions and concerns addressed with patient. IV removed per protocol. Patient left via wheelchair with .

## 2024-03-14 LAB — BACTERIA BLD CULT: NORMAL

## 2024-03-15 ENCOUNTER — APPOINTMENT (OUTPATIENT)
Dept: HEMATOLOGY/ONCOLOGY | Facility: HOSPITAL | Age: 64
End: 2024-03-15
Payer: MEDICARE

## 2024-03-15 ENCOUNTER — TELEPHONE (OUTPATIENT)
Dept: HEMATOLOGY/ONCOLOGY | Facility: HOSPITAL | Age: 64
End: 2024-03-15
Payer: MEDICARE

## 2024-03-15 NOTE — TELEPHONE ENCOUNTER
Called patient regarding infusion appointment today. Left a message to call [phone] to reschedule or reach out to us at [phone].     Reviewed and approved by ROSE KENNEDY on 3/15/24 at 11:59 AM.

## 2024-03-16 LAB — BACTERIA BLD CULT: NORMAL

## 2024-03-19 ENCOUNTER — HOSPITAL ENCOUNTER (OUTPATIENT)
Dept: RADIATION ONCOLOGY | Facility: HOSPITAL | Age: 64
Setting detail: RADIATION/ONCOLOGY SERIES
Discharge: HOME | End: 2024-03-19
Payer: MEDICARE

## 2024-03-19 ENCOUNTER — TELEPHONE (OUTPATIENT)
Dept: RADIATION ONCOLOGY | Facility: HOSPITAL | Age: 64
End: 2024-03-19
Payer: MEDICARE

## 2024-03-19 VITALS
TEMPERATURE: 96.8 F | RESPIRATION RATE: 18 BRPM | HEART RATE: 76 BPM | OXYGEN SATURATION: 98 % | BODY MASS INDEX: 30.85 KG/M2 | DIASTOLIC BLOOD PRESSURE: 95 MMHG | SYSTOLIC BLOOD PRESSURE: 150 MMHG | WEIGHT: 185.19 LBS | HEIGHT: 65 IN

## 2024-03-19 DIAGNOSIS — Z17.0 MALIGNANT NEOPLASM OF OVERLAPPING SITES OF BREAST IN FEMALE, ESTROGEN RECEPTOR POSITIVE, UNSPECIFIED LATERALITY (MULTI): ICD-10-CM

## 2024-03-19 DIAGNOSIS — C50.819 MALIGNANT NEOPLASM OF OVERLAPPING SITES OF BREAST IN FEMALE, ESTROGEN RECEPTOR POSITIVE, UNSPECIFIED LATERALITY (MULTI): ICD-10-CM

## 2024-03-19 DIAGNOSIS — C79.31 MALIGNANT NEOPLASM METASTATIC TO BRAIN (MULTI): Primary | ICD-10-CM

## 2024-03-19 PROCEDURE — 99215 OFFICE O/P EST HI 40 MIN: CPT | Performed by: STUDENT IN AN ORGANIZED HEALTH CARE EDUCATION/TRAINING PROGRAM

## 2024-03-19 PROCEDURE — 99205 OFFICE O/P NEW HI 60 MIN: CPT | Performed by: STUDENT IN AN ORGANIZED HEALTH CARE EDUCATION/TRAINING PROGRAM

## 2024-03-19 ASSESSMENT — ENCOUNTER SYMPTOMS
GASTROINTESTINAL NEGATIVE: 1
LIGHT-HEADEDNESS: 1
VOICE CHANGE: 1
OCCASIONAL FEELINGS OF UNSTEADINESS: 1
EXTREMITY WEAKNESS: 1
NERVOUS/ANXIOUS: 1
DEPRESSION: 1
LOSS OF SENSATION IN FEET: 1
SLEEP DISTURBANCE: 1
FATIGUE: 1
RESPIRATORY NEGATIVE: 1
EYES NEGATIVE: 1
CARDIOVASCULAR NEGATIVE: 1
ENDOCRINE NEGATIVE: 1
DIZZINESS: 1

## 2024-03-19 ASSESSMENT — PATIENT HEALTH QUESTIONNAIRE - PHQ9
2. FEELING DOWN, DEPRESSED OR HOPELESS: SEVERAL DAYS
1. LITTLE INTEREST OR PLEASURE IN DOING THINGS: NOT AT ALL
SUM OF ALL RESPONSES TO PHQ9 QUESTIONS 1 AND 2: 1

## 2024-03-19 ASSESSMENT — COLUMBIA-SUICIDE SEVERITY RATING SCALE - C-SSRS
1. IN THE PAST MONTH, HAVE YOU WISHED YOU WERE DEAD OR WISHED YOU COULD GO TO SLEEP AND NOT WAKE UP?: NO
2. HAVE YOU ACTUALLY HAD ANY THOUGHTS OF KILLING YOURSELF?: NO
6. HAVE YOU EVER DONE ANYTHING, STARTED TO DO ANYTHING, OR PREPARED TO DO ANYTHING TO END YOUR LIFE?: NO

## 2024-03-19 NOTE — PROGRESS NOTES
Radiation Oncology Outpatient Consult    Patient Name:  Chio Turner  MRN:  [id number]  :  1960    Referring Provider: Alisha Kahn MD  Primary Care Provider: Moiz Thurman MD  Care Team: Patient Care Team:  Moiz Thurman MD as PCP - General  Alisha Kahn MD as Consulting Physician (Hematology and Oncology)  Cruzito Cannon MD as Consulting Physician (Hematology and Oncology)    Date of Service: 3/19/2024     SUBJECTIVE  History of Present Illness:  Chio Turner is a 63 y.o. female who was referred by Alisha Kahn MD, for a consultation to the [facility] Department of Radiation Oncology.  She is presenting for evaluation and management of brain metastases.     Chio Turner is a 63 y.o. female with a past medical history of stage IV right-sided breast ductal carcinoma (ER+ (90%), VA + (70%), HER-2-) diagnosed in 2015 following with Dr. Kahn with metastatic disease to the lungs and liver.     She underwent foundation 1 testing which showed [id number] mutation; CCND1 amplification, [id number]*49; and [id number].99     Treatment History:    2015: Cancer Staging: Lung biopsy: Metastatic mammary carcinoma with neuroendocrine features, ER 99%, VA 85%, and her2 negative.  2015: New Treatment Plan: Initiated on Letrozole 2.5mg by mouth daily plus pablociclib 125 mg by mouth daily 3 weeks on, one week off.  2018-8-10: New Treatment Plan: Letrozole discontinued. Patient started on Faslodex plus Ibrance due to slight disease progression in right breast  2019: Cancer Related Surgery: S/p bilateral mastectomy with right ALND. Pathology revealed 3.0cm of invasive ductal carcinoma, with 1/2 SLNs positive and 1/5 ALNs positive.  2019: New Treatment Plan: Faslodex discontinued. Letrozole restarted. Will continue on Ibrance  2020: New Treatment Plan: Faslodex resumed due to progression in lung. Letrozole discontinued. Will remain on Ibrance  2022:  New Treatment Plan: Started on Afinitor and Exemestane. Ibrance and Faslodex discontinued due to disease progression  2022-9-16: Disease Assessment: Interval increase in thoracic tumor burden  2022-10-7: New Treatment Plan: Started on Capecitabine 2000mg by mouth BID  2023-2-10: New treatment started with FAM-Trastuzumab Deruxtecan due to disease progression  2023-15-12: Patient underwent restaging scans. This showed disease progression in lung and liver with redemonstration of several bilateral pulmonary nodules/masses stable to increased in size since 09/01/2023, concerning for progressive metastatic disease and interval development of several hypoattenuating, some subtle, hepatic lesions as detailed above, concerning for metastatic disease.  12/26/2023: Patient underwent liver biopsy. This revealed metastatic mammry carcinoma with neuroendocrine features, ER+ 95%, MD positive 85% and Her2 negative 1+  1/12/2024: Received the last dose of Enhertu. Discontinued due to disease progression  02/01/2024: Switched to Ribociclib and Faslodex. This regimen selected because foundation one confirms CCND1 amplification and patient was stable on Ibrance for 5 years from 8021-7012 and has thus far been the most effective regimen for patient.  02/24/2024: Patient completed MRI of the brain which showed hemorrhagic lesions in the cerebellar vermis and right parietal lobe adjacent to the splenium of the corpus callosum along with a few small enhancing cerebellar lesions are nonspecific.   02/24/2024: MRCP showed innumerable liver lesion    Prior Radiotherapy:  No  Radiation Treatments       No radiation treatments to show. (Treatments may have been administered in another system.)          Current Systemic Treatment:  Yes, describe: Ribociclib and Faslodex     Presence of Pacemaker or ICD:  No    Past Medical History:    Past Medical History:   Diagnosis Date    Breast cancer (CMS/HCC)     Other specified health status 06/16/2015     No pertinent past medical history        Past Surgical History:    Past Surgical History:   Procedure Laterality Date    CT GUIDED PERCUTANEOUS BIOPSY LUNG  7/1/2015    CT GUIDED PERCUTANEOUS BIOPSY LUNG 7/1/2015 [facility]    HERNIA REPAIR  06/16/2015    Hernia Repair    OTHER SURGICAL HISTORY  01/23/2019    Breast reconstruction    OTHER SURGICAL HISTORY  01/24/2019    Mastectomy bilateral    TONSILLECTOMY  06/16/2015    Tonsillectomy    US GUIDED NEEDLE LIVER BIOPSY  12/26/2023    US GUIDED NEEDLE LIVER BIOPSY 12/26/2023 Torie Rose MD [facility]        Family History:  Cancer-related family history includes Colon cancer in her father; Ovarian cancer in her mother.    Social History:    Social History     Tobacco Use    Smoking status: Never     Passive exposure: Never    Smokeless tobacco: Never       Allergies:    Allergies   Allergen Reactions    Penicillins Hives and Unknown        Medications:    Current Outpatient Medications:     amLODIPine (Norvasc) 5 mg tablet, Take 1 tablet (5 mg) by mouth once daily., Disp: , Rfl:     amoxicillin-pot clavulanate (Augmentin) 875-125 mg tablet, Take 1 tablet (875 mg) by mouth 2 times a day for 12 days., Disp: 24 tablet, Rfl: 0    chlorthalidone (Hygroton) 25 mg tablet, Take 1 tablet (25 mg) by mouth once daily., Disp: , Rfl:     dexAMETHasone (Decadron) 4 mg tablet, Take 1 tablet (4 mg) by mouth 2 times a day., Disp: 60 tablet, Rfl: 0    levothyroxine (Synthroid, Levoxyl) 100 mcg tablet, Take 1 tablet (100 mcg) by mouth once daily in the morning. Take before meals., Disp: , Rfl:     loperamide (Imodium) 2 mg capsule, Take 2 capsules (4 mg) by mouth with the first episode of diarrhea and 1 capsule (2 mg) by mouth with any additional episodes. Maximum 8 capsules (16 mg) per day., Disp: 100 capsule, Rfl: 2    ondansetron (Zofran) 8 mg tablet, Take 1 tablet (8 mg) by mouth every 8 hours if needed for nausea or vomiting., Disp: 30 tablet, Rfl: 5    pantoprazole (ProtoNix)  "40 mg EC tablet, Take 1 tablet (40 mg) by mouth once daily in the morning. Take before meals. Do not crush, chew, or split. Do not start before March 13, 2024., Disp: 30 tablet, Rfl: 1    PaxiL 10 mg tablet, Take 1 tablet (10 mg) by mouth once daily., Disp: , Rfl:     potassium chloride CR 20 mEq ER tablet, Take 2 tablets (40 mEq) by mouth twice a day., Disp: , Rfl:     prochlorperazine (Compazine) 10 mg tablet, Take 1 tablet (10 mg) by mouth every 6 hours if needed for nausea or vomiting., Disp: 30 tablet, Rfl: 5      Review of Systems:  Review of Systems   Constitutional:  Positive for fatigue.   HENT:   Positive for voice change.    Eyes: Negative.    Respiratory: Negative.     Cardiovascular: Negative.    Gastrointestinal: Negative.    Endocrine: Negative.    Genitourinary: Negative.     Musculoskeletal:  Positive for gait problem.   Skin: Negative.    Neurological:  Positive for dizziness, extremity weakness, gait problem and light-headedness.   Psychiatric/Behavioral:  Positive for sleep disturbance. The patient is nervous/anxious.      The patient's current pain level was assessed.  They report currently having a pain of 0 out of 10.  They feel their pain is under control without the use of pain medications.    Performance Status:  The Karnofsky performance scale today is 50, Requires considerable assistance and frequent medical care (ECOG equivalent 2).        OBJECTIVE  Physical Exam:  BP (!) 150/95   Pulse 76   Temp 36 °C (96.8 °F) (Skin)   Resp 18   Ht 1.651 m (5' 5\")   Wt 84 kg (185 lb 3 oz)   SpO2 98%   BMI 30.82 kg/m²    Physical Exam  HENT:      Head: Normocephalic.   Eyes:      Extraocular Movements: Extraocular movements intact.   Neurological:      General: No focal deficit present.      Mental Status: She is alert.      Motor: Weakness present.      Gait: Gait abnormal.      Comments: Proximal muscle weakness          Laboratory Review:  There are no laboratory contraindications to " radiation therapy.        Pathology Review:  The pertinent pathology results were reviewed and discussed with the patient.     Imaging:  The pertinent imaging results were reviewed and discussed with the patient.      MR brain w and wo IV contrast    Result Date: 2/23/2024  Interpreted By:  Tree Sin  and Tamanna Lam STUDY: MR BRAIN W AND WO IV CONTRAST;  2/23/2024 4:03 pm   INDICATION: Signs/Symptoms:new brain mets.   COMPARISON: CTA head and neck dated 02/21/2024   ACCESSION NUMBER(S): [id number]   ORDERING CLINICIAN: BONNIE LACY   TECHNIQUE: Axial T2, FLAIR, DWI, gradient echo T2 and sagittal and coronal T1 weighted images of brain were acquired. Post contrast T1 weighted images were acquired after administration of 18 mL Dotarem gadolinium based intravenous contrast.   FINDINGS: CSF Spaces: The ventricles, sulci and basal cisterns are within normal limits. No abnormal extra-axial fluid collection.   Parenchyma: There is a 1.3 x 1.2 cm lesion centered on the cerebellar vermis corresponding with the hyperdense lesion seen on prior CT. It demonstrates T2 and FLAIR hypointensity, blooming artifact on gradient echo imaging, and heterogeneous appearance on T1 weighted imaging. No definite enhancement is seen on the postcontrast imaging. A 2nd lesion with similar imaging characteristics is seen in the parietal lobe adjacent to the splenium of the corpus callosum. There is a thin rim of FLAIR hyperintensity surrounding both lesions, consistent with edema. There is questionable subtle enhancement associated with the smaller lesion (series [id number], image 183). Additionally there is a 4 mm enhancing lesion in the superior aspect of the right cerebellum (Image 227) and a 3 mm focus of enhancement within the superior aspect of the left cerebellar hemisphere (Image 221). There is no diffusion restriction abnormality to suggest acute infarct. There is no mass effect or midline shift.   Paranasal Sinuses and  Mastoids: Visualized paranasal sinuses and mastoid air cells are unremarkable.       Hemorrhagic lesions in the cerebellar vermis and right parietal lobe adjacent to the splenium of the corpus callosum along with a few small enhancing cerebellar lesions are nonspecific. Consider hemorrhagic metastases as well as hemorrhages due to slow flow vascular malformations.   I personally reviewed the images/study and I agree with the findings as stated by resident physician Dr. Delvin Nolen.   MACRO: None   Signed by: Tree Sin 2/23/2024 4:34 PM Dictation workstation:   [id number]         ASSESSMENT:  Chio Turner is a 63 y.o. female with metastatic breast cancer with brain metastases. She is here for consideration of brain directed radiation.    PLAN:  I personally reviewed the imaging and explained to the patient the different treatment modalities including whole brain RT vs gamma knife SRS vs observation. We discussed the risks, benefits and alternatives of treatment. I recommended gamma knife SRS for the limited brain metastases.    The potential side effects of Gamma Knife radiosurgery were discussed including short-term adverse effects associated with stereotactic frame placement such as headache following frame removal, bleeding or infection at the sites of pin insertion and a few days of periorbital swelling. Adverse effects specific to radiosurgery include short term sequelae such as fatigue and a small risk of alopecia approximately 3 weeks after treatment which would likely regrow within 3 months. Long-term risks associated with radiosurgery include fatigue, and treatment-associated brain edema and/or radionecrosis potentially causing headaches or other neurological symptoms requiring corticosteroids, anti-angiogenic agents, or even surgical intervention to control. We also discussed that there is a risk of additional brain metastases being found on the day of Gamma Knife treatment; in this scenario,  additional brain lesions may be treated, or if the additional disease is extensive, the procedure may need to be aborted and  alternative treatments may be recommended.     We will plan for a pre-plan MRI in 1-2 weeks followed by gamma knife SRS the next day with a  CT fusion. We discussed this pathway as there is a possibility the bleeds in the brain are not related to her breast cancer and we may abort the treatment. The patient showed good understanding of our recommendations and signed an informed consent.

## 2024-03-19 NOTE — TELEPHONE ENCOUNTER
Called pt. to remind of appointment on 3/19/2024 at 1:30 pm with Dr. Contreras. Pt answered and confirmed  appointment.

## 2024-03-21 PROBLEM — C50.819: Status: ACTIVE | Noted: 2024-03-21

## 2024-03-21 PROBLEM — C79.31 MALIGNANT NEOPLASM METASTATIC TO BRAIN (MULTI): Status: ACTIVE | Noted: 2024-03-21

## 2024-03-21 PROBLEM — Z17.0: Status: ACTIVE | Noted: 2024-03-21

## 2024-03-21 LAB
LAB AP ASR DISCLAIMER: NORMAL
LABORATORY COMMENT REPORT: NORMAL
PATH REPORT.ADDENDUM SPEC: NORMAL
PATH REPORT.ADDENDUM SPEC: NORMAL
PATH REPORT.FINAL DX SPEC: NORMAL
PATH REPORT.GROSS SPEC: NORMAL
PATH REPORT.RELEVANT HX SPEC: NORMAL
PATH REPORT.TOTAL CANCER: NORMAL

## 2024-03-21 NOTE — ADDENDUM NOTE
Encounter addended by: Tereso Contreras MD, MS on: 3/21/2024 2:13 PM   Actions taken: Level of Service modified, Visit diagnoses modified, Order list changed, Diagnosis association updated, SmartForm saved, Clinical Note Signed

## 2024-03-22 DIAGNOSIS — C50.919 MALIGNANT NEOPLASM OF FEMALE BREAST, UNSPECIFIED ESTROGEN RECEPTOR STATUS, UNSPECIFIED LATERALITY, UNSPECIFIED SITE OF BREAST (MULTI): ICD-10-CM

## 2024-03-25 DIAGNOSIS — C50.919 CARCINOMA OF BREAST METASTATIC TO LUNG, UNSPECIFIED LATERALITY (MULTI): ICD-10-CM

## 2024-03-25 DIAGNOSIS — C78.00 CARCINOMA OF BREAST METASTATIC TO LUNG, UNSPECIFIED LATERALITY (MULTI): ICD-10-CM

## 2024-03-26 ENCOUNTER — TELEPHONE (OUTPATIENT)
Dept: ADMISSION | Facility: HOSPITAL | Age: 64
End: 2024-03-26
Payer: MEDICARE

## 2024-03-26 ENCOUNTER — APPOINTMENT (OUTPATIENT)
Dept: HEMATOLOGY/ONCOLOGY | Facility: HOSPITAL | Age: 64
End: 2024-03-26
Payer: MEDICARE

## 2024-03-26 ENCOUNTER — APPOINTMENT (OUTPATIENT)
Dept: RADIATION ONCOLOGY | Facility: HOSPITAL | Age: 64
End: 2024-03-26
Payer: MEDICARE

## 2024-03-26 NOTE — TELEPHONE ENCOUNTER
"Sheila from [facility] called the office because she just faxed Chio' lab results over to office. She states some of the CMP was unable to be processed \"due to icterus\" per the lab. The parts of the CMP that were unable to be processed were:   total protein, liver enzymes, glucose and BUN.  Asking if CMP needs redrawn.   "

## 2024-03-26 NOTE — TELEPHONE ENCOUNTER
Results received and sent to team.    I called Sheila and let her know that per team, no need to repeat labs at this time.   No further questions or concerns at this time.

## 2024-03-29 ENCOUNTER — APPOINTMENT (OUTPATIENT)
Dept: HEMATOLOGY/ONCOLOGY | Facility: HOSPITAL | Age: 64
End: 2024-03-29
Payer: MEDICARE

## 2024-04-02 ENCOUNTER — APPOINTMENT (OUTPATIENT)
Dept: RADIATION ONCOLOGY | Facility: HOSPITAL | Age: 64
End: 2024-04-02
Payer: MEDICARE

## 2024-04-05 ENCOUNTER — APPOINTMENT (OUTPATIENT)
Dept: HEMATOLOGY/ONCOLOGY | Facility: HOSPITAL | Age: 64
End: 2024-04-05
Payer: MEDICARE

## 2024-05-14 ENCOUNTER — TELEPHONE (OUTPATIENT)
Dept: ADMISSION | Facility: HOSPITAL | Age: 64
End: 2024-05-14
Payer: MEDICARE